# Patient Record
Sex: MALE | Race: WHITE | NOT HISPANIC OR LATINO | URBAN - METROPOLITAN AREA
[De-identification: names, ages, dates, MRNs, and addresses within clinical notes are randomized per-mention and may not be internally consistent; named-entity substitution may affect disease eponyms.]

---

## 2017-09-06 ENCOUNTER — OUTPATIENT (OUTPATIENT)
Dept: OUTPATIENT SERVICES | Facility: HOSPITAL | Age: 62
LOS: 1 days | End: 2017-09-06
Payer: MEDICAID

## 2017-09-06 DIAGNOSIS — I10 ESSENTIAL (PRIMARY) HYPERTENSION: ICD-10-CM

## 2017-09-06 DIAGNOSIS — I73.9 PERIPHERAL VASCULAR DISEASE, UNSPECIFIED: ICD-10-CM

## 2017-09-06 DIAGNOSIS — D64.9 ANEMIA, UNSPECIFIED: ICD-10-CM

## 2017-09-06 PROCEDURE — 74176 CT ABD & PELVIS W/O CONTRAST: CPT

## 2017-09-06 PROCEDURE — 74176 CT ABD & PELVIS W/O CONTRAST: CPT | Mod: 26

## 2017-11-13 ENCOUNTER — APPOINTMENT (OUTPATIENT)
Dept: NEPHROLOGY | Facility: CLINIC | Age: 62
End: 2017-11-13

## 2017-11-30 ENCOUNTER — INPATIENT (INPATIENT)
Facility: HOSPITAL | Age: 62
LOS: 7 days | Discharge: NURSING, REHAB OR LONG-TERM CARE | DRG: 208 | End: 2017-12-08
Attending: INTERNAL MEDICINE | Admitting: INTERNAL MEDICINE
Payer: MEDICAID

## 2017-11-30 VITALS
HEIGHT: 64 IN | TEMPERATURE: 96 F | HEART RATE: 40 BPM | OXYGEN SATURATION: 85 % | RESPIRATION RATE: 24 BRPM | DIASTOLIC BLOOD PRESSURE: 62 MMHG | WEIGHT: 139.99 LBS | SYSTOLIC BLOOD PRESSURE: 196 MMHG

## 2017-11-30 DIAGNOSIS — I35.0 NONRHEUMATIC AORTIC (VALVE) STENOSIS: ICD-10-CM

## 2017-11-30 DIAGNOSIS — F10.10 ALCOHOL ABUSE, UNCOMPLICATED: ICD-10-CM

## 2017-11-30 DIAGNOSIS — N18.5 CHRONIC KIDNEY DISEASE, STAGE 5: ICD-10-CM

## 2017-11-30 DIAGNOSIS — I46.9 CARDIAC ARREST, CAUSE UNSPECIFIED: ICD-10-CM

## 2017-11-30 DIAGNOSIS — J96.90 RESPIRATORY FAILURE, UNSPECIFIED, UNSPECIFIED WHETHER WITH HYPOXIA OR HYPERCAPNIA: ICD-10-CM

## 2017-11-30 DIAGNOSIS — J96.01 ACUTE RESPIRATORY FAILURE WITH HYPOXIA: ICD-10-CM

## 2017-11-30 DIAGNOSIS — E11.22 TYPE 2 DIABETES MELLITUS WITH DIABETIC CHRONIC KIDNEY DISEASE: ICD-10-CM

## 2017-11-30 DIAGNOSIS — J44.9 CHRONIC OBSTRUCTIVE PULMONARY DISEASE, UNSPECIFIED: ICD-10-CM

## 2017-11-30 DIAGNOSIS — I73.9 PERIPHERAL VASCULAR DISEASE, UNSPECIFIED: ICD-10-CM

## 2017-11-30 DIAGNOSIS — J44.1 CHRONIC OBSTRUCTIVE PULMONARY DISEASE WITH (ACUTE) EXACERBATION: ICD-10-CM

## 2017-11-30 DIAGNOSIS — H57.02 ANISOCORIA: ICD-10-CM

## 2017-11-30 DIAGNOSIS — T17.908A UNSPECIFIED FOREIGN BODY IN RESPIRATORY TRACT, PART UNSPECIFIED CAUSING OTHER INJURY, INITIAL ENCOUNTER: ICD-10-CM

## 2017-11-30 DIAGNOSIS — I50.9 HEART FAILURE, UNSPECIFIED: ICD-10-CM

## 2017-11-30 DIAGNOSIS — N19 UNSPECIFIED KIDNEY FAILURE: ICD-10-CM

## 2017-11-30 LAB
ALBUMIN SERPL ELPH-MCNC: 3.2 G/DL — LOW (ref 3.3–5)
ALP SERPL-CCNC: 122 U/L — HIGH (ref 30–120)
ALT FLD-CCNC: 24 U/L DA — SIGNIFICANT CHANGE UP (ref 10–60)
AMPHET UR-MCNC: NEGATIVE — SIGNIFICANT CHANGE UP
ANION GAP SERPL CALC-SCNC: 16 MMOL/L — SIGNIFICANT CHANGE UP (ref 5–17)
ANION GAP SERPL CALC-SCNC: 16 MMOL/L — SIGNIFICANT CHANGE UP (ref 5–17)
ANION GAP SERPL CALC-SCNC: 17 MMOL/L — SIGNIFICANT CHANGE UP (ref 5–17)
APPEARANCE UR: ABNORMAL
APTT BLD: 36.1 SEC — SIGNIFICANT CHANGE UP (ref 27.5–37.4)
AST SERPL-CCNC: 23 U/L — SIGNIFICANT CHANGE UP (ref 10–40)
BACTERIA # UR AUTO: ABNORMAL
BARBITURATES UR SCN-MCNC: NEGATIVE — SIGNIFICANT CHANGE UP
BASE EXCESS BLDA CALC-SCNC: -9.8 MMOL/L — LOW (ref -2–2)
BASE EXCESS BLDA CALC-SCNC: -9.8 MMOL/L — LOW (ref -2–2)
BASOPHILS # BLD AUTO: 0.1 K/UL — SIGNIFICANT CHANGE UP (ref 0–0.2)
BASOPHILS NFR BLD AUTO: 1.3 % — SIGNIFICANT CHANGE UP (ref 0–2)
BENZODIAZ UR-MCNC: POSITIVE
BILIRUB SERPL-MCNC: 0.4 MG/DL — SIGNIFICANT CHANGE UP (ref 0.2–1.2)
BILIRUB UR-MCNC: NEGATIVE — SIGNIFICANT CHANGE UP
BLOOD GAS COMMENTS: SIGNIFICANT CHANGE UP
BLOOD GAS SOURCE: SIGNIFICANT CHANGE UP
BLOOD GAS SOURCE: SIGNIFICANT CHANGE UP
BUN SERPL-MCNC: 55 MG/DL — HIGH (ref 7–23)
BUN SERPL-MCNC: 56 MG/DL — HIGH (ref 7–23)
BUN SERPL-MCNC: 59 MG/DL — HIGH (ref 7–23)
CALCIUM SERPL-MCNC: 7.6 MG/DL — LOW (ref 8.4–10.5)
CALCIUM SERPL-MCNC: 7.6 MG/DL — LOW (ref 8.4–10.5)
CALCIUM SERPL-MCNC: 7.9 MG/DL — LOW (ref 8.4–10.5)
CHLORIDE SERPL-SCNC: 107 MMOL/L — SIGNIFICANT CHANGE UP (ref 96–108)
CHLORIDE SERPL-SCNC: 107 MMOL/L — SIGNIFICANT CHANGE UP (ref 96–108)
CHLORIDE SERPL-SCNC: 110 MMOL/L — HIGH (ref 96–108)
CK MB CFR SERPL CALC: 4.3 NG/ML — HIGH (ref 0–3.6)
CO2 SERPL-SCNC: 16 MMOL/L — LOW (ref 22–31)
CO2 SERPL-SCNC: 17 MMOL/L — LOW (ref 22–31)
CO2 SERPL-SCNC: 19 MMOL/L — LOW (ref 22–31)
COCAINE METAB.OTHER UR-MCNC: NEGATIVE — SIGNIFICANT CHANGE UP
COLOR SPEC: YELLOW — SIGNIFICANT CHANGE UP
COMMENT - URINE: SIGNIFICANT CHANGE UP
CREAT SERPL-MCNC: 4.69 MG/DL — HIGH (ref 0.5–1.3)
CREAT SERPL-MCNC: 4.82 MG/DL — HIGH (ref 0.5–1.3)
CREAT SERPL-MCNC: 5.1 MG/DL — HIGH (ref 0.5–1.3)
DIFF PNL FLD: ABNORMAL
EOSINOPHIL # BLD AUTO: 0 K/UL — SIGNIFICANT CHANGE UP (ref 0–0.5)
EOSINOPHIL NFR BLD AUTO: 0.7 % — SIGNIFICANT CHANGE UP (ref 0–6)
EPI CELLS # UR: ABNORMAL
GLUCOSE BLDC GLUCOMTR-MCNC: 138 MG/DL — HIGH (ref 70–99)
GLUCOSE BLDC GLUCOMTR-MCNC: 142 MG/DL — HIGH (ref 70–99)
GLUCOSE BLDC GLUCOMTR-MCNC: 146 MG/DL — HIGH (ref 70–99)
GLUCOSE BLDC GLUCOMTR-MCNC: 177 MG/DL — HIGH (ref 70–99)
GLUCOSE SERPL-MCNC: 135 MG/DL — HIGH (ref 70–99)
GLUCOSE SERPL-MCNC: 141 MG/DL — HIGH (ref 70–99)
GLUCOSE SERPL-MCNC: 192 MG/DL — HIGH (ref 70–99)
GLUCOSE UR QL: 50 MG/DL
GRAM STN FLD: SIGNIFICANT CHANGE UP
GRAN CASTS # UR COMP ASSIST: ABNORMAL /LPF
HCO3 BLDA-SCNC: 16 MMOL/L — LOW (ref 21–29)
HCO3 BLDA-SCNC: 17 MMOL/L — LOW (ref 21–29)
HCT VFR BLD CALC: 30.4 % — LOW (ref 39–50)
HGB BLD-MCNC: 10.3 G/DL — LOW (ref 13–17)
HOROWITZ INDEX BLDA+IHG-RTO: 100 — SIGNIFICANT CHANGE UP
HOROWITZ INDEX BLDA+IHG-RTO: 100 — SIGNIFICANT CHANGE UP
HYALINE CASTS # UR AUTO: ABNORMAL /LPF
INR BLD: 1.08 RATIO — SIGNIFICANT CHANGE UP (ref 0.88–1.16)
KETONES UR-MCNC: NEGATIVE — SIGNIFICANT CHANGE UP
LACTATE SERPL-SCNC: 1.3 MMOL/L — SIGNIFICANT CHANGE UP (ref 0.7–2)
LACTATE SERPL-SCNC: 3.4 MMOL/L — HIGH (ref 0.7–2)
LACTATE SERPL-SCNC: 4.8 MMOL/L — CRITICAL HIGH (ref 0.7–2)
LEUKOCYTE ESTERASE UR-ACNC: ABNORMAL
LYMPHOCYTES # BLD AUTO: 0.7 K/UL — LOW (ref 1–3.3)
LYMPHOCYTES # BLD AUTO: 14.8 % — SIGNIFICANT CHANGE UP (ref 13–44)
MAGNESIUM SERPL-MCNC: 3.3 MG/DL — HIGH (ref 1.6–2.6)
MCHC RBC-ENTMCNC: 31.8 PG — SIGNIFICANT CHANGE UP (ref 27–34)
MCHC RBC-ENTMCNC: 34 GM/DL — SIGNIFICANT CHANGE UP (ref 32–36)
MCV RBC AUTO: 93.8 FL — SIGNIFICANT CHANGE UP (ref 80–100)
METHADONE UR-MCNC: NEGATIVE — SIGNIFICANT CHANGE UP
MONOCYTES # BLD AUTO: 0.3 K/UL — SIGNIFICANT CHANGE UP (ref 0–0.9)
MONOCYTES NFR BLD AUTO: 6.7 % — SIGNIFICANT CHANGE UP (ref 2–14)
NEUTROPHILS # BLD AUTO: 3.6 K/UL — SIGNIFICANT CHANGE UP (ref 1.8–7.4)
NEUTROPHILS NFR BLD AUTO: 76.6 % — SIGNIFICANT CHANGE UP (ref 43–77)
NITRITE UR-MCNC: NEGATIVE — SIGNIFICANT CHANGE UP
NT-PROBNP SERPL-SCNC: HIGH PG/ML (ref 0–125)
OPIATES UR-MCNC: NEGATIVE — SIGNIFICANT CHANGE UP
PCO2 BLDA: 26 MMHG — LOW (ref 32–46)
PCO2 BLDA: 37 MMHG — SIGNIFICANT CHANGE UP (ref 32–46)
PCP SPEC-MCNC: SIGNIFICANT CHANGE UP
PCP UR-MCNC: NEGATIVE — SIGNIFICANT CHANGE UP
PH BLD: 7.25 — LOW (ref 7.35–7.45)
PH BLD: 7.37 — SIGNIFICANT CHANGE UP (ref 7.35–7.45)
PH UR: 5 — SIGNIFICANT CHANGE UP (ref 5–8)
PLATELET # BLD AUTO: 222 K/UL — SIGNIFICANT CHANGE UP (ref 150–400)
PO2 BLDA: 191 MMHG — HIGH (ref 74–108)
PO2 BLDA: 54 MMHG — LOW (ref 74–108)
POTASSIUM SERPL-MCNC: 5.1 MMOL/L — SIGNIFICANT CHANGE UP (ref 3.5–5.3)
POTASSIUM SERPL-MCNC: 5.4 MMOL/L — HIGH (ref 3.5–5.3)
POTASSIUM SERPL-MCNC: 5.5 MMOL/L — HIGH (ref 3.5–5.3)
POTASSIUM SERPL-SCNC: 5.1 MMOL/L — SIGNIFICANT CHANGE UP (ref 3.5–5.3)
POTASSIUM SERPL-SCNC: 5.4 MMOL/L — HIGH (ref 3.5–5.3)
POTASSIUM SERPL-SCNC: 5.5 MMOL/L — HIGH (ref 3.5–5.3)
PROT SERPL-MCNC: 6.7 G/DL — SIGNIFICANT CHANGE UP (ref 6–8.3)
PROT UR-MCNC: 500 MG/DL
PROTHROM AB SERPL-ACNC: 11.8 SEC — SIGNIFICANT CHANGE UP (ref 9.8–12.7)
RBC # BLD: 3.24 M/UL — LOW (ref 4.2–5.8)
RBC # FLD: 14.4 % — SIGNIFICANT CHANGE UP (ref 10.3–14.5)
RBC CASTS # UR COMP ASSIST: ABNORMAL /HPF (ref 0–4)
SAO2 % BLDA: 100 % — HIGH (ref 92–96)
SAO2 % BLDA: 82 % — LOW (ref 92–96)
SODIUM SERPL-SCNC: 140 MMOL/L — SIGNIFICANT CHANGE UP (ref 135–145)
SODIUM SERPL-SCNC: 140 MMOL/L — SIGNIFICANT CHANGE UP (ref 135–145)
SODIUM SERPL-SCNC: 145 MMOL/L — SIGNIFICANT CHANGE UP (ref 135–145)
SP GR SPEC: 1.02 — SIGNIFICANT CHANGE UP (ref 1.01–1.02)
SPECIMEN SOURCE: SIGNIFICANT CHANGE UP
THC UR QL: NEGATIVE — SIGNIFICANT CHANGE UP
TROPONIN I SERPL-MCNC: 0.01 NG/ML — LOW (ref 0.02–0.06)
TSH SERPL-MCNC: 9.17 UIU/ML — HIGH (ref 0.27–4.2)
UROBILINOGEN FLD QL: NEGATIVE MG/DL — SIGNIFICANT CHANGE UP
WBC # BLD: 4.7 K/UL — SIGNIFICANT CHANGE UP (ref 3.8–10.5)
WBC # FLD AUTO: 4.7 K/UL — SIGNIFICANT CHANGE UP (ref 3.8–10.5)
WBC UR QL: SIGNIFICANT CHANGE UP

## 2017-11-30 PROCEDURE — 71250 CT THORAX DX C-: CPT | Mod: 26

## 2017-11-30 PROCEDURE — 70450 CT HEAD/BRAIN W/O DYE: CPT | Mod: 26

## 2017-11-30 PROCEDURE — 99291 CRITICAL CARE FIRST HOUR: CPT

## 2017-11-30 PROCEDURE — 76775 US EXAM ABDO BACK WALL LIM: CPT | Mod: 26

## 2017-11-30 PROCEDURE — 99292 CRITICAL CARE ADDL 30 MIN: CPT

## 2017-11-30 PROCEDURE — 93306 TTE W/DOPPLER COMPLETE: CPT | Mod: 26

## 2017-11-30 PROCEDURE — 99223 1ST HOSP IP/OBS HIGH 75: CPT | Mod: 25

## 2017-11-30 PROCEDURE — 71010: CPT | Mod: 26

## 2017-11-30 PROCEDURE — 93010 ELECTROCARDIOGRAM REPORT: CPT

## 2017-11-30 RX ORDER — SODIUM CHLORIDE 9 MG/ML
1000 INJECTION, SOLUTION INTRAVENOUS
Qty: 0 | Refills: 0 | Status: DISCONTINUED | OUTPATIENT
Start: 2017-11-30 | End: 2017-12-08

## 2017-11-30 RX ORDER — ALBUTEROL 90 UG/1
2 AEROSOL, METERED ORAL EVERY 6 HOURS
Qty: 0 | Refills: 0 | Status: DISCONTINUED | OUTPATIENT
Start: 2017-11-30 | End: 2017-12-01

## 2017-11-30 RX ORDER — SODIUM CHLORIDE 9 MG/ML
1000 INJECTION INTRAMUSCULAR; INTRAVENOUS; SUBCUTANEOUS
Qty: 0 | Refills: 0 | Status: COMPLETED | OUTPATIENT
Start: 2017-11-30 | End: 2017-11-30

## 2017-11-30 RX ORDER — FENTANYL CITRATE 50 UG/ML
25 INJECTION INTRAVENOUS
Qty: 0 | Refills: 0 | Status: DISCONTINUED | OUTPATIENT
Start: 2017-11-30 | End: 2017-12-01

## 2017-11-30 RX ORDER — INSULIN LISPRO 100/ML
VIAL (ML) SUBCUTANEOUS EVERY 6 HOURS
Qty: 0 | Refills: 0 | Status: DISCONTINUED | OUTPATIENT
Start: 2017-11-30 | End: 2017-12-01

## 2017-11-30 RX ORDER — MIDAZOLAM HYDROCHLORIDE 1 MG/ML
2 INJECTION, SOLUTION INTRAMUSCULAR; INTRAVENOUS ONCE
Qty: 0 | Refills: 0 | Status: DISCONTINUED | OUTPATIENT
Start: 2017-11-30 | End: 2017-11-30

## 2017-11-30 RX ORDER — PROPOFOL 10 MG/ML
5 INJECTION, EMULSION INTRAVENOUS
Qty: 1000 | Refills: 0 | Status: DISCONTINUED | OUTPATIENT
Start: 2017-11-30 | End: 2017-12-01

## 2017-11-30 RX ORDER — HEPARIN SODIUM 5000 [USP'U]/ML
5000 INJECTION INTRAVENOUS; SUBCUTANEOUS EVERY 8 HOURS
Qty: 0 | Refills: 0 | Status: DISCONTINUED | OUTPATIENT
Start: 2017-11-30 | End: 2017-12-08

## 2017-11-30 RX ORDER — SODIUM CHLORIDE 9 MG/ML
1000 INJECTION INTRAMUSCULAR; INTRAVENOUS; SUBCUTANEOUS
Qty: 0 | Refills: 0 | Status: DISCONTINUED | OUTPATIENT
Start: 2017-11-30 | End: 2017-11-30

## 2017-11-30 RX ORDER — DEXTROSE 50 % IN WATER 50 %
25 SYRINGE (ML) INTRAVENOUS ONCE
Qty: 0 | Refills: 0 | Status: DISCONTINUED | OUTPATIENT
Start: 2017-11-30 | End: 2017-12-08

## 2017-11-30 RX ORDER — FERROUS SULFATE 325(65) MG
1 TABLET ORAL
Qty: 0 | Refills: 0 | COMMUNITY

## 2017-11-30 RX ORDER — PIPERACILLIN AND TAZOBACTAM 4; .5 G/20ML; G/20ML
3.38 INJECTION, POWDER, LYOPHILIZED, FOR SOLUTION INTRAVENOUS EVERY 8 HOURS
Qty: 0 | Refills: 0 | Status: DISCONTINUED | OUTPATIENT
Start: 2017-11-30 | End: 2017-11-30

## 2017-11-30 RX ORDER — INFLUENZA VIRUS VACCINE 15; 15; 15; 15 UG/.5ML; UG/.5ML; UG/.5ML; UG/.5ML
0.5 SUSPENSION INTRAMUSCULAR ONCE
Qty: 0 | Refills: 0 | Status: COMPLETED | OUTPATIENT
Start: 2017-11-30 | End: 2017-11-30

## 2017-11-30 RX ORDER — GLUCAGON INJECTION, SOLUTION 0.5 MG/.1ML
1 INJECTION, SOLUTION SUBCUTANEOUS ONCE
Qty: 0 | Refills: 0 | Status: DISCONTINUED | OUTPATIENT
Start: 2017-11-30 | End: 2017-12-08

## 2017-11-30 RX ORDER — ENOXAPARIN SODIUM 100 MG/ML
40 INJECTION SUBCUTANEOUS DAILY
Qty: 0 | Refills: 0 | Status: DISCONTINUED | OUTPATIENT
Start: 2017-11-30 | End: 2017-11-30

## 2017-11-30 RX ORDER — PIPERACILLIN AND TAZOBACTAM 4; .5 G/20ML; G/20ML
3.38 INJECTION, POWDER, LYOPHILIZED, FOR SOLUTION INTRAVENOUS ONCE
Qty: 0 | Refills: 0 | Status: COMPLETED | OUTPATIENT
Start: 2017-11-30 | End: 2017-11-30

## 2017-11-30 RX ORDER — AMLODIPINE BESYLATE 2.5 MG/1
1 TABLET ORAL
Qty: 0 | Refills: 0 | COMMUNITY

## 2017-11-30 RX ORDER — DEXTROSE 50 % IN WATER 50 %
12.5 SYRINGE (ML) INTRAVENOUS ONCE
Qty: 0 | Refills: 0 | Status: DISCONTINUED | OUTPATIENT
Start: 2017-11-30 | End: 2017-12-08

## 2017-11-30 RX ORDER — VANCOMYCIN HCL 1 G
1000 VIAL (EA) INTRAVENOUS ONCE
Qty: 0 | Refills: 0 | Status: COMPLETED | OUTPATIENT
Start: 2017-11-30 | End: 2017-11-30

## 2017-11-30 RX ORDER — SODIUM CHLORIDE 9 MG/ML
1000 INJECTION, SOLUTION INTRAVENOUS
Qty: 0 | Refills: 0 | Status: DISCONTINUED | OUTPATIENT
Start: 2017-11-30 | End: 2017-12-01

## 2017-11-30 RX ORDER — HYDRALAZINE HCL 50 MG
0 TABLET ORAL
Qty: 0 | Refills: 0 | COMMUNITY

## 2017-11-30 RX ORDER — PIPERACILLIN AND TAZOBACTAM 4; .5 G/20ML; G/20ML
3.38 INJECTION, POWDER, LYOPHILIZED, FOR SOLUTION INTRAVENOUS EVERY 12 HOURS
Qty: 0 | Refills: 0 | Status: COMPLETED | OUTPATIENT
Start: 2017-11-30 | End: 2017-12-06

## 2017-11-30 RX ORDER — SODIUM BICARBONATE 1 MEQ/ML
50 SYRINGE (ML) INTRAVENOUS ONCE
Qty: 0 | Refills: 0 | Status: COMPLETED | OUTPATIENT
Start: 2017-11-30 | End: 2017-11-30

## 2017-11-30 RX ORDER — MIDAZOLAM HYDROCHLORIDE 1 MG/ML
2 INJECTION, SOLUTION INTRAMUSCULAR; INTRAVENOUS
Qty: 0 | Refills: 0 | Status: DISCONTINUED | OUTPATIENT
Start: 2017-11-30 | End: 2017-11-30

## 2017-11-30 RX ORDER — VANCOMYCIN HCL 1 G
1000 VIAL (EA) INTRAVENOUS EVERY 12 HOURS
Qty: 0 | Refills: 0 | Status: DISCONTINUED | OUTPATIENT
Start: 2017-11-30 | End: 2017-11-30

## 2017-11-30 RX ORDER — PANTOPRAZOLE SODIUM 20 MG/1
40 TABLET, DELAYED RELEASE ORAL DAILY
Qty: 0 | Refills: 0 | Status: DISCONTINUED | OUTPATIENT
Start: 2017-11-30 | End: 2017-12-08

## 2017-11-30 RX ORDER — DEXTROSE 50 % IN WATER 50 %
1 SYRINGE (ML) INTRAVENOUS ONCE
Qty: 0 | Refills: 0 | Status: DISCONTINUED | OUTPATIENT
Start: 2017-11-30 | End: 2017-12-08

## 2017-11-30 RX ADMIN — Medication 1: at 23:35

## 2017-11-30 RX ADMIN — HEPARIN SODIUM 5000 UNIT(S): 5000 INJECTION INTRAVENOUS; SUBCUTANEOUS at 23:00

## 2017-11-30 RX ADMIN — MIDAZOLAM HYDROCHLORIDE 2 MILLIGRAM(S): 1 INJECTION, SOLUTION INTRAMUSCULAR; INTRAVENOUS at 06:09

## 2017-11-30 RX ADMIN — PROPOFOL 1.91 MICROGRAM(S)/KG/MIN: 10 INJECTION, EMULSION INTRAVENOUS at 19:45

## 2017-11-30 RX ADMIN — PROPOFOL 1.91 MICROGRAM(S)/KG/MIN: 10 INJECTION, EMULSION INTRAVENOUS at 06:31

## 2017-11-30 RX ADMIN — ALBUTEROL 2 PUFF(S): 90 AEROSOL, METERED ORAL at 12:52

## 2017-11-30 RX ADMIN — MIDAZOLAM HYDROCHLORIDE 2 MILLIGRAM(S): 1 INJECTION, SOLUTION INTRAMUSCULAR; INTRAVENOUS at 06:32

## 2017-11-30 RX ADMIN — HEPARIN SODIUM 5000 UNIT(S): 5000 INJECTION INTRAVENOUS; SUBCUTANEOUS at 13:22

## 2017-11-30 RX ADMIN — PIPERACILLIN AND TAZOBACTAM 200 GRAM(S): 4; .5 INJECTION, POWDER, LYOPHILIZED, FOR SOLUTION INTRAVENOUS at 06:09

## 2017-11-30 RX ADMIN — PANTOPRAZOLE SODIUM 40 MILLIGRAM(S): 20 TABLET, DELAYED RELEASE ORAL at 11:36

## 2017-11-30 RX ADMIN — SODIUM CHLORIDE 75 MILLILITER(S): 9 INJECTION, SOLUTION INTRAVENOUS at 15:33

## 2017-11-30 RX ADMIN — MIDAZOLAM HYDROCHLORIDE 2 MILLIGRAM(S): 1 INJECTION, SOLUTION INTRAMUSCULAR; INTRAVENOUS at 06:48

## 2017-11-30 RX ADMIN — Medication 50 MILLIEQUIVALENT(S): at 06:57

## 2017-11-30 RX ADMIN — PROPOFOL 1.91 MICROGRAM(S)/KG/MIN: 10 INJECTION, EMULSION INTRAVENOUS at 07:11

## 2017-11-30 RX ADMIN — PROPOFOL 1.91 MICROGRAM(S)/KG/MIN: 10 INJECTION, EMULSION INTRAVENOUS at 15:33

## 2017-11-30 RX ADMIN — SODIUM CHLORIDE 1000 MILLILITER(S): 9 INJECTION INTRAMUSCULAR; INTRAVENOUS; SUBCUTANEOUS at 06:07

## 2017-11-30 RX ADMIN — FENTANYL CITRATE 25 MICROGRAM(S): 50 INJECTION INTRAVENOUS at 23:44

## 2017-11-30 RX ADMIN — PIPERACILLIN AND TAZOBACTAM 25 GRAM(S): 4; .5 INJECTION, POWDER, LYOPHILIZED, FOR SOLUTION INTRAVENOUS at 17:18

## 2017-11-30 RX ADMIN — SODIUM CHLORIDE 75 MILLILITER(S): 9 INJECTION INTRAMUSCULAR; INTRAVENOUS; SUBCUTANEOUS at 06:33

## 2017-11-30 RX ADMIN — SODIUM CHLORIDE 75 MILLILITER(S): 9 INJECTION INTRAMUSCULAR; INTRAVENOUS; SUBCUTANEOUS at 07:11

## 2017-11-30 RX ADMIN — ALBUTEROL 2 PUFF(S): 90 AEROSOL, METERED ORAL at 19:48

## 2017-11-30 RX ADMIN — SODIUM CHLORIDE 1000 MILLILITER(S): 9 INJECTION INTRAMUSCULAR; INTRAVENOUS; SUBCUTANEOUS at 06:08

## 2017-11-30 RX ADMIN — Medication 250 MILLIGRAM(S): at 06:32

## 2017-11-30 NOTE — CONSULT NOTE ADULT - SUBJECTIVE AND OBJECTIVE BOX
CHIEF COMPLAINT: None offered due to critical illness / condition    HPI: 62 year old man with a history of chronic illness, severe PVD s/p left BKA, anemia, peripheral neuropathy, uncontrolled DM, etOH abuse transferred from his long term care facility for the evaluation of hypoxia.  Shortly after arrival in the ED he was hypothermic / hypoxic and he experienced an asystolic arrest and was successfully resuscitated with CPR (chest compressions) and epinephrine.  He was found to be in renal failure.  He is intubated and sedated on a ventilator in the SPCU - unable to participate in history.      PAST MEDICAL & SURGICAL HISTORY:  Peripheral Arterial Disease  Controlled Type 2 Diabetes with Leg or Foot Ulcer  ETOH Abuse  Amputation, Below Knee, Unilateral, Traumatic    SOCIAL HISTORY: Unable to obtain due to patient condition    FAMILY HISTORY: Unable to obtain due to patient condition    MEDICATIONS  (STANDING):  ALBUTerol    90 MICROgram(s) HFA Inhaler 2 Puff(s) Inhalation every 6 hours  heparin  Injectable 5000 Unit(s) SubCutaneous every 8 hours  influenza   Vaccine 0.5 milliLiter(s) IntraMuscular once  pantoprazole  Injectable 40 milliGRAM(s) IV Push daily  piperacillin/tazobactam IVPB. 3.375 Gram(s) IV Intermittent every 12 hours  propofol Infusion 5 MICROgram(s)/kG/Min (1.905 mL/Hr) IV Continuous <Continuous>  sodium chloride 0.9%. 1000 milliLiter(s) (75 mL/Hr) IV Continuous <Continuous>    MEDICATIONS  (PRN):  fentaNYL    Injectable 25 MICROGram(s) IV Push every 2 hours PRN pain, sedation, resp distress, ventilated pt    Allergies: No Known Allergies    REVIEW OF SYSTEMS: Unable to obtain due to patient condition    VITAL SIGNS:   Vital Signs Last 24 Hrs  T(C): 36.4 (30 Nov 2017 09:02), Max: 36.4 (30 Nov 2017 09:02)  T(F): 97.5 (30 Nov 2017 09:02), Max: 97.5 (30 Nov 2017 09:02)  HR: 49 (30 Nov 2017 10:00) (40 - 124)  BP: 129/53 (30 Nov 2017 10:00) (110/62 - 196/62)  BP(mean): 77 (30 Nov 2017 10:00) (75 - 80)  RR: 23 (30 Nov 2017 10:00) (20 - 34)  SpO2: 100% (30 Nov 2017 10:00) (85% - 100%)    PHYSICAL EXAM:  Constitutional: Sedated on vent, not following commands, appears older than his stated age  Eyes:  ,  Pupil assymetry.  Pulmonary: Mechanically ventilated via ETT; no crackles or wheeze  Cardiovascular: S1 and S2, regular rate and rhythm, I/VI systolic murmur  Gastrointestinal: Abdomen is soft, nontender.   Lymph: No pedal edema. No cervical lymphadenopathy.  Neurological: Sedated; minimally responsive (per RN) when sedation is held.  Skin: No rash.    LABS:                  10.3   4.7   )-----------( 222      ( 30 Nov 2017 05:40 )             30.4     140    |  107    |  59     ----------------------------<  192    5.1     |  17     |  5.10     CARDIAC MARKERS ( 30 Nov 2017 05:40 ) .005 ng/mL / x     / x     / x     / 4.3 ng/mL    11-30 @ 05:40 Pro Bnp [id number]    ECG (11/30 @ 6:03): Sinus bradycardia 59 bpm, low QRS voltage in limb leads, possible inferior infarct (old), mildly prolonged QTc    CT Chest No Cont (11.30.17 @ 08:25): Small bilateral pleural effusions.  Bilateral airspace disease present. Airspace disease present in the right upper lobe right middle lobe and right lower lobe. Airspace disease visible in the left lower lobe. No mediastinal lesions evident. Hilar regions obscured by bilateral lung pathology. Cardiomegaly and coronary artery calcifications present. No evidence of pericardial effusion. No evidence of thoracic aortic aneurysm. The central airway is intact. An endotracheal tube is present. No adrenal  lesions. The spleen is not enlarged. No acute osseous abnormalities.    US Transthoracic Echocardiogram w/Doppler Complete (11.30.17 @ 08:14) >  1. Normal left ventricular size and function. LVEF estimated at 65-70%.  2. Normal right ventricular size and function.  3. Minimal prolapse of the anterior mitral leaflet. Mild mitral stenosis.  Minimal mitral regurgitation.  4. Mild aortic stenosis.  5. Mild tricuspid regurgitation.     Tele: Sinus bradycardia

## 2017-11-30 NOTE — DIETITIAN INITIAL EVALUATION ADULT. - NS AS NUTRI INTERV ENTERAL NUTRITION
Composition/Rate/Recommend: Suplena via NGT/desired route start at 30ml/hr, increase by 10ml every 6 hrs until goal 45ml/hr is reached (to provide 1944kcal, 48 g protein)/Volume

## 2017-11-30 NOTE — CONSULT NOTE ADULT - ASSESSMENT
·	EDWIN on CKD 4: ATN  ·	s/p cardiac arrest, on vent, ? Pneumonia  ·	Metaboic acidosis  ·	Hyperkalemia  ·	Diabetes    Improving creatinine trend. pt with underlying CKD 4. Will add bicarb to IVF. IV lasix.   Pulmonary and cardiology follow up. Check cultures. IV abx as per ID.   Will follow electrolytes and renal function trend. Monitor potassium trend.   Monitor blood sugar levels. Coverage as needed.   Further recommendations pending clinical course. Thank you for the courtesy of this referral.
62M with unknown pmhx, admitted with cardiac arrest, acute hypoxemic respiratory failure, large right pna    -Sedation for vent synchrony after neuro status obtained  -EKG, check CE's  -Monitor HD's  -Check echo  -Continue lung protective ventilation. Unstable for SBT at this time  -NPO. PPI  -IV fluids  -F/U labs, cultures  -Empiric abx. Monitor wbc/temp  -DVT ppx  -supportive care
Seen for AMS/s/op cardiac arrest.  Neuro-exam limited but no focal findings seen.  Pt is awake and seem to follow commands.  Less likely that pt had any hypoxic/anoxic brain injury.  Etiology of unequal pupils are not known, did pt had any eye sx in past?  CT Head - No acute pathology.  Would get MRI Brain upon extubation - if no contraindications.  Supportive care.

## 2017-11-30 NOTE — H&P ADULT - PROBLEM SELECTOR PLAN 5
likely aspiration pneumonia.  c/w IV zosyn.  f/u lactate level.  f/u culture reports.  ID-  consult appreciated.

## 2017-11-30 NOTE — CONSULT NOTE ADULT - SUBJECTIVE AND OBJECTIVE BOX
Reason for Consult :     HPI:    62 year old male hx of ? COPD, ETOH abuse, PVD, s/p right BKA , ? CKD vs EDWIN , DM admitted with acuter cardiorespiratory arrest , primarily respiratory failure leading to cardiac arrest. Was sent to ER with increasing SOB and ams . He was hypoxic  on admission   . He was in moderate respiratory distress after suctioning went into cardiac arrest .     Past Medical & Surgical Hx:  PAST MEDICAL & SURGICAL HISTORY:  Peripheral Arterial Disease  Controlled Type 2 Diabetes with Leg or Foot Ulcer  ETOH Abuse  Amputation, Below Knee, Unilateral, Traumatic      Social History-- Long term resident   EtOH: priro hx of etoh   Tobacco:   Drug Use:       FAMILY HISTORY:      Allergies    No Known Allergies    Intolerances        Home/Outpatient  Medications   Home Medications:  DermaPhor topical ointment:  (2017 07:23)  ferrous sulfate 325 mg (65 mg elemental iron) oral delayed release tablet: 1 tab(s) orally once a day (2017 07:23)  HumaLOG KwikPen:  (2017 07:23)  hydrALAZINE: 75 milligram(s) orally every 12 hours (:23)  Metoprolol Tartrate 25 mg oral tablet: orally every 8 hours (2017 07:23)  Norvasc 5 mg oral tablet: 1 tab(s) orally once a day (2017 07:23)  Procrit 3000 units/mL injectable solution: injectable 3 times a week (2017 07:23)  sodium bicarbonate 650 mg oral tablet: orally once a day (2017 07:23)      Current Inpatient Medications :    ANTIBIOTICS:   piperacillin/tazobactam IVPB. 3.375 Gram(s) IV Intermittent every 8 hours  vancomycin  IVPB 1000 milliGRAM(s) IV Intermittent every 12 hours      OTHER RELEVANT MEDICATIONS :  ALBUTerol    90 MICROgram(s) HFA Inhaler 2 Puff(s) Inhalation every 6 hours  fentaNYL    Injectable 25 MICROGram(s) IV Push every 2 hours PRN  heparin  Injectable 5000 Unit(s) SubCutaneous every 8 hours  pantoprazole  Injectable 40 milliGRAM(s) IV Push daily  propofol Infusion 5 MICROgram(s)/kG/Min IV Continuous <Continuous>  sodium chloride 0.9%. 1000 milliLiter(s) IV Continuous <Continuous>          REVIEW OF SYSTEMS:    ROS:  Unable to obtain due to : intubated, un responsive         I&O's Detail    2017 07:01  -  2017 07:00  --------------------------------------------------------  IN:    IV PiggyBack: 350 mL    Sodium Chloride 0.9% IV Bolus: 2000 mL  Total IN: 2350 mL    OUT:  Total OUT: 0 mL    Total NET: 2350 mL        Mode: AC/ CMV (Assist Control/ Continuous Mandatory Ventilation), RR (machine): 22, TV (machine): 450, FiO2: 100, PEEP: 10, ITime: 1, MAP: 11, PIP: 32    Physical Exam:  Vital Signs Last 24 Hrs  T(C): 35.6 (2017 05:09), Max: 35.6 (2017 05:09)  T(F): 96 (2017 05:09), Max: 96 (2017 05:09)  HR: 71 (2017 07:11) (40 - 124)  BP: 110/62 (2017 07:11) (110/62 - 196/62)  BP(mean): --  RR: 29 (2017 07:11) (20 - 34)  SpO2: 99% (2017 06:38) (85% - 100%)  Height (cm): 162.56 ( @ 05:09)  Weight (kg): 63.5 ( @ 05:09)  BMI (kg/m2): 24 ( @ 05:09)  BSA (m2): 1.68 ( @ 05:09)      GEN: intubated   HEENT: normocephalic and atraumatic. Pupils unequal  left smaller than right pupil. Moist mucosa. intubted.  NECK: Supple. No carotid bruits.  No lymphadenopathy or thyromegaly.  LUNGS: coarse rales R>L  on  auscultation.  HEART: Regular rate and rhythm without murmur.  ABDOMEN: Soft, nontender, and nondistended.  Positive bowel sounds.  No hepatosplenomegaly was noted.  EXTREMITIES: Without any cyanosis, clubbing, rash + , right BKA  NEUROLOGIC: unresponsive    SKIN: papules dried scabs ? secondary to itching       Labs:                  10.3   4.7    )----------(  222       ( 2017 05:40 )               30.4      140    |  107    |  59     ----------------------------<  192        ( 2017 05:40 )  5.1     |  17     |  5.10     Ca    7.9        ( 2017 05:40 )  Mg     3.3       ( 2017 05:40 )    TPro  6.7    /  Alb  3.2    /  TBili  0.4    /  DBili  x      /  AST  23     /  ALT  24     /  AlkPhos  122    ( 2017 05:40 )    LIVER FUNCTIONS - ( 2017 05:40 )  Alb: 3.2 g/dL / Pro: 6.7 g/dL / ALK PHOS: 122 U/L / ALT: 24 U/L DA / AST: 23 U/L / GGT: x           PT/INR -  11.8 sec / 1.08 ratio   ( 2017 05:40 )       PTT -  36.1 sec   ( 2017 05:40 )  CAPILLARY BLOOD GLUCOSE    CARDIAC MARKERS ( 2017 05:40 )  .005 ng/mL / x     / x     / x     / 4.3 ng/mL      Urinalysis Basic - ( 2017 06:27 )    Color: Yellow / Appearance: x / S.020 / pH: x  Gluc: x / Ketone: Negative  / Bili: Negative / Urobili: Negative mg/dL   Blood: x / Protein: 500 mg/dL / Nitrite: Negative   Leuk Esterase: Trace / RBC: 6-10 /HPF / WBC 0-2   Sq Epi: x / Non Sq Epi: Moderate / Bacteria: Few      ABG - ( 2017 06:23 )  pH:  7.253     /  pCO2: 37    /  pO2: 54    / HCO3: 16    / Base Excess: -9.8  /  SaO2: 82          RECENT CULTURES:          RADIOLOGY & ADDITIONAL STUDIES:     Xray Chest 1 View AP- PORTABLE-Urgent (17 @ 06:02) >    INTERPRETATION:  ETT above the wing. Patchy opacification of the right   hemithorax which may represent infection, hemmorhage, or contusion. Small   right pleural effusion.        Assessment :     62 year old male hx of ? COPD, ETOH abuse, PVD, s/p right BKA , ? CKD vs EDWIN admitted with acuter cardiorespiratory arrest , primarily respiratory failure leading to cardiac arrest , has unequal pupils raising the possibility of a acute cerebral event .    CXR shows extensive right sided infiltrates , has elevated lactate and severe metabolic acidosis    Plan :   - get stat CT head and ct chest non contrast  - adjust antibiotics to renal dose   - get renal , pulmonary , cardiology and neurology consults   - full sepsis work up   - cardiac enzymes   - serial lactate and trend CBC  - obtain records geo [facility] , discussed with Dr. CLARY Casas     Continue with present regime .  Appropriate use of antibiotics and adverse effects reviewed.        > 45 minutes spent in direct patient care reviewing  the notes, lab data/ imaging , discussion with multidisciplinary team. All questions were addressed and answered to the best of my capacity .    Thank you for allowing me to participate in the care of your patient .

## 2017-11-30 NOTE — PROGRESS NOTE ADULT - SUBJECTIVE AND OBJECTIVE BOX
ICU Progress Note    HPI:    S:    Pt seen and examined  HD # 1  Pt here for NH ([facility]) for hypoxia  Cardiac arrest in ER 2/2 acute hypoxic resp failure  Intubated  Concern for R PNA on CXR  PMHx IDDM, alcoholism, PAD, CKD stage 4 (takes PO bicarb as outpt), anemia, HTN    Remains intubated  Moving all extremities  K+ peaked at 5.5, now trending down  La peaked ~4, now coming down      Allergies    No Known Allergies    Intolerances        MEDICATIONS  (STANDING):  ALBUTerol    90 MICROgram(s) HFA Inhaler 2 Puff(s) Inhalation every 6 hours  dextrose 5%. 1000 milliLiter(s) (50 mL/Hr) IV Continuous <Continuous>  dextrose 50% Injectable 12.5 Gram(s) IV Push once  dextrose 50% Injectable 25 Gram(s) IV Push once  dextrose 50% Injectable 25 Gram(s) IV Push once  heparin  Injectable 5000 Unit(s) SubCutaneous every 8 hours  influenza   Vaccine 0.5 milliLiter(s) IntraMuscular once  insulin lispro (HumaLOG) corrective regimen sliding scale   SubCutaneous every 6 hours  pantoprazole  Injectable 40 milliGRAM(s) IV Push daily  piperacillin/tazobactam IVPB. 3.375 Gram(s) IV Intermittent every 12 hours  propofol Infusion 5 MICROgram(s)/kG/Min (1.905 mL/Hr) IV Continuous <Continuous>  sodium chloride 0.45% 1000 milliLiter(s) (75 mL/Hr) IV Continuous <Continuous>    MEDICATIONS  (PRN):  dextrose Gel 1 Dose(s) Oral once PRN Blood Glucose LESS THAN 70 milliGRAM(s)/deciliter  fentaNYL    Injectable 25 MICROGram(s) IV Push every 2 hours PRN pain, sedation, resp distress, ventilated pt  glucagon  Injectable 1 milliGRAM(s) IntraMuscular once PRN Glucose LESS THAN 70 milligrams/deciliter      Drug Dosing Weight  Height (cm): 162.56 (2017 08:36)  Weight (kg): 63.5 (2017 08:36)  BMI (kg/m2): 24 (2017 08:36)  BSA (m2): 1.68 (2017 08:36)    BARAJAS: [x ] YES [ ] NO        PAST MEDICAL & SURGICAL HISTORY:  Peripheral Arterial Disease  Controlled Type 2 Diabetes with Leg or Foot Ulcer  ETOH Abuse  Amputation, Below Knee, Unilateral, Traumatic      FAMILY HISTORY:          ROS: See HPI; otherwise, all systems reviewed and negative.    O:    ICU Vital Signs Last 24 Hrs  T(C): 37.2 (2017 20:02), Max: 37.4 (2017 16:05)  T(F): 99 (2017 20:02), Max: 99.3 (2017 16:05)  HR: 57 (2017 22:00) (40 - 124)  BP: 139/53 (2017 22:00) (101/49 - 196/62)  BP(mean): 78 (2017 22:00) (66 - 97)  ABP: --  ABP(mean): --  RR: 22 (2017 22:00) (5 - 34)  SpO2: 100% (2017 22:00) (85% - 100%)      ABG - ( 2017 12:29 )  pH: x     /  pCO2: 26    /  pO2: 191   / HCO3: 17    / Base Excess: -9.8  /  SaO2: 100                 I&O's Detail    2017 07:01  -  2017 07:00  --------------------------------------------------------  IN:    IV PiggyBack: 350 mL    Sodium Chloride 0.9% IV Bolus: 2000 mL  Total IN: 2350 mL    OUT:  Total OUT: 0 mL    Total NET: 2350 mL      2017 07:01  -  2017 22:40  --------------------------------------------------------  IN:    IV PiggyBack: 100 mL    propofol Infusion: 62.7 mL    sodium chloride 0.45%: 600 mL    sodium chloride 0.9%: 450 mL  Total IN: 1212.7 mL    OUT:    Indwelling Catheter - Urethral: 100 mL  Total OUT: 100 mL    Total NET: 1112.7 mL          Mode: AC/ CMV (Assist Control/ Continuous Mandatory Ventilation)  RR (machine): 22  TV (machine): 450  FiO2: 60  PEEP: 7  ITime: 1  MAP: 11  PIP: 22      PE:    Constitutional: Adult M lying in bed in NAD.   Neck: No JVD, trachea midline. ETT in place.   Respiratory: CTA B/L good BS B/L no W/R/R.  Cardiovascular: S1S2+ RRR no M/R/G.  Gastrointestinal: Soft, NTND.  Extremities: No peripheral edema, No cyanosis, clubbing.  Neurological: Intubated, sedated, but opening eyes and PEDERSON.  Skin: No rashes, warm, moist.    LABS:    CBC Full  -  ( 2017 05:40 )  WBC Count : 4.7 K/uL  Hemoglobin : 10.3 g/dL  Hematocrit : 30.4 %  Platelet Count - Automated : 222 K/uL  Mean Cell Volume : 93.8 fl  Mean Cell Hemoglobin : 31.8 pg  Mean Cell Hemoglobin Concentration : 34.0 gm/dL  Auto Neutrophil # : 3.6 K/uL  Auto Lymphocyte # : 0.7 K/uL  Auto Monocyte # : 0.3 K/uL  Auto Eosinophil # : 0.0 K/uL  Auto Basophil # : 0.1 K/uL  Auto Neutrophil % : 76.6 %  Auto Lymphocyte % : 14.8 %  Auto Monocyte % : 06.7 %  Auto Eosinophil % : 0.7 %  Auto Basophil % : 1.3 %        145  |  110<H>  |  56<H>  ----------------------------<  141<H>  5.4<H>   |  19<L>  |  4.82<H>    Ca    7.6<L>      2017 16:57  Mg     3.3         TPro  6.7  /  Alb  3.2<L>  /  TBili  0.4  /  DBili  x   /  AST  23  /  ALT  24  /  AlkPhos  122<H>      PT/INR - ( 2017 05:40 )   PT: 11.8 sec;   INR: 1.08 ratio         PTT - ( 2017 05:40 )  PTT:36.1 sec  Urinalysis Basic - ( 2017 06:27 )    Color: Yellow / Appearance: Slightly Turbid / S.020 / pH: x  Gluc: x / Ketone: Negative  / Bili: Negative / Urobili: Negative mg/dL   Blood: x / Protein: 500 mg/dL / Nitrite: Negative   Leuk Esterase: Trace / RBC: 6-10 /HPF / WBC 0-2   Sq Epi: x / Non Sq Epi: Moderate / Bacteria: Few      CAPILLARY BLOOD GLUCOSE      POCT Blood Glucose.: 142 mg/dL (2017 17:20)  POCT Blood Glucose.: 138 mg/dL (2017 12:11)  POCT Blood Glucose.: 146 mg/dL (2017 05:28)    CARDIAC MARKERS ( 2017 05:40 )  .005 ng/mL / x     / x     / x     / 4.3 ng/mL      LIVER FUNCTIONS - ( 2017 05:40 )  Alb: 3.2 g/dL / Pro: 6.7 g/dL / ALK PHOS: 122 U/L / ALT: 24 U/L DA / AST: 23 U/L / GGT: x

## 2017-11-30 NOTE — PROGRESS NOTE ADULT - ASSESSMENT
A:    62yMale  HD # 1    Here for:    1. Cardiac arrest 2/2  2. Acute hypoxic resp failure, with  3. PNA  4. EDWIN in setting of  5. CRF, stage 4  6. Hyperkalemia    This patient requires critical care for support of one or more vital organ systems with a high probability of imminent or life threatening deterioration in his/her condition    P:    Neuro: Analgosedation.    CV: Continue hemodynamic monitoring.     Pulm: Cont MV, pulmonary toileting. Peridex. Nebs.     GI/Nutrition: NPO.     /Renal: Monitor UOP. Monitor BMP.  Replete Lytes as needed. Cont alkali therapy.     HEME- Chemical and mechanical DVT ppx. f/u CBC. f/u coags as needed.    ID:  Cont abx. f/u Cx's.    Lines/Tubes: PIV, ckoer.    Endo: Maintain euglycemia, ISS.    Skin:  Cont skin care, pressure ulcer prevention.    Dispo: Cont critical care.    TOTAL CRITICAL CARE TIME: 37  (EXCLUSIVE of any non bundled procedures)    Note: This time spent INCLUDES time spent directly as this patient's bedside with evaluation, review of chart including review of laboratory and imaging studies, interpretation of vital signs and cardiac output measurements, any necessary ventilator management, and time spent discussing plan of care with patient and family, including goals of care discussion.

## 2017-11-30 NOTE — CONSULT NOTE ADULT - PROBLEM SELECTOR RECOMMENDATION 9
S/p resuscitated cardiac arrest; primary cardiac etiology seems less likely in the absence of previous heart disease, normal LV function on echocardiogram, and normal serial cardiac biomarkers; elevated proBNP not reliable in setting of acute renal failure. I favor continued treatment for possible pneumonia, supportive care, observation on telemetry monitor.

## 2017-11-30 NOTE — CONSULT NOTE ADULT - SUBJECTIVE AND OBJECTIVE BOX
Date/Time Patient Seen:  		  Referring MD:   Data Reviewed	       Patient is a 62y old  Male who presents with a chief complaint of resp failure      Subjective/HPI    seen and examined, ventilated, on ABX, on sedation, hx obtained from the record,     62 year old male hx of ? COPD, ETOH abuse, PVD, s/p right BKA , ? CKD vs EDWIN , DM admitted with acuter cardiorespiratory arrest , primarily respiratory failure leading to cardiac arrest. Was sent to ER with increasing SOB and ams . He was hypoxic  on admission   . He was in moderate respiratory distress after suctioning went into cardiac arrest .      PAST MEDICAL & SURGICAL HISTORY:  Peripheral Arterial Disease  Controlled Type 2 Diabetes with Leg or Foot Ulcer  ETOH Abuse  Amputation, Below Knee, Unilateral, Traumatic  Amputation, Below Elbow, Unilateral, Traumatic        Medication list         MEDICATIONS  (STANDING):  ALBUTerol    90 MICROgram(s) HFA Inhaler 2 Puff(s) Inhalation every 6 hours  heparin  Injectable 5000 Unit(s) SubCutaneous every 8 hours  pantoprazole  Injectable 40 milliGRAM(s) IV Push daily  piperacillin/tazobactam IVPB. 3.375 Gram(s) IV Intermittent every 12 hours  propofol Infusion 5 MICROgram(s)/kG/Min (1.905 mL/Hr) IV Continuous <Continuous>  sodium chloride 0.9%. 1000 milliLiter(s) (75 mL/Hr) IV Continuous <Continuous>    MEDICATIONS  (PRN):  fentaNYL    Injectable 25 MICROGram(s) IV Push every 2 hours PRN pain, sedation, resp distress, ventilated pt         Vitals log        ICU Vital Signs Last 24 Hrs  T(C): 35.6 (30 Nov 2017 05:09), Max: 35.6 (30 Nov 2017 05:09)  T(F): 96 (30 Nov 2017 05:09), Max: 96 (30 Nov 2017 05:09)  HR: 60 (30 Nov 2017 07:30) (40 - 124)  BP: 134/44 (30 Nov 2017 07:30) (110/62 - 196/62)  BP(mean): --  ABP: --  ABP(mean): --  RR: 31 (30 Nov 2017 07:30) (20 - 34)  SpO2: 90% (30 Nov 2017 07:30) (85% - 100%)       Mode: AC/ CMV (Assist Control/ Continuous Mandatory Ventilation)  RR (machine): 22  TV (machine): 450  FiO2: 100  PEEP: 10  ITime: 1  MAP: 11  PIP: 32      Input and Output:  I&O's Detail    29 Nov 2017 07:01  -  30 Nov 2017 07:00  --------------------------------------------------------  IN:    IV PiggyBack: 350 mL    Sodium Chloride 0.9% IV Bolus: 2000 mL  Total IN: 2350 mL    OUT:  Total OUT: 0 mL    Total NET: 2350 mL          Lab Data                        10.3   4.7   )-----------( 222      ( 30 Nov 2017 05:40 )             30.4     11-30    140  |  107  |  59<H>  ----------------------------<  192<H>  5.1   |  17<L>  |  5.10<H>    Ca    7.9<L>      30 Nov 2017 05:40  Mg     3.3     11-30    TPro  6.7  /  Alb  3.2<L>  /  TBili  0.4  /  DBili  x   /  AST  23  /  ALT  24  /  AlkPhos  122<H>  11-30    ABG - ( 30 Nov 2017 06:23 )  pH: x     /  pCO2: 37    /  pO2: 54    / HCO3: 16    / Base Excess: -9.8  /  SaO2: 82          smoker  drinker  previous admission at [place] for [facility]HRE[facility]        CARDIAC MARKERS ( 30 Nov 2017 05:40 )  .005 ng/mL / x     / x     / x     / 4.3 ng/mL        Review of Systems	      Objective     Physical Examination    R BKA  lung dec bs  abd soft  et tube in  head at  heart s1s2      Pertinent Lab findings & Imaging      Lizette:  NO   Adequate UO     I&O's Detail    29 Nov 2017 07:01  -  30 Nov 2017 07:00  --------------------------------------------------------  IN:    IV PiggyBack: 350 mL    Sodium Chloride 0.9% IV Bolus: 2000 mL  Total IN: 2350 mL    OUT:  Total OUT: 0 mL    Total NET: 2350 mL               Discussed with:     Cultures:	        Radiology

## 2017-11-30 NOTE — CONSULT NOTE ADULT - SUBJECTIVE AND OBJECTIVE BOX
62M with trevor PMHx resident of [facility], sent to [place] ER for c/o hypoxia. In ER Pt became acutely unresponsive, asystole on monitor. CPR initiated, Pt received 1 amp of EPI, ~2 minutes of chest compressions with ROSC. Pt intubated. CXR prior to cardiac arrest revealed large right PNA. Awaiting PMHx information to be faxed over by [facility].      PAST MEDICAL & SURGICAL HISTORY:  Peripheral Arterial Disease  Controlled Type 2 Diabetes with Leg or Foot Ulcer  ETOH Abuse  Amputation, Below Knee, Unilateral, Traumatic      Review of Systems:  unable to obtain    T(F): 96 (11-30-17 @ 05:09), Max: 96 (11-30-17 @ 05:09)  HR: 109 (11-30-17 @ 05:33) (40 - 109)  BP: 175/64 (11-30-17 @ 05:33) (175/64 - 196/62)  RR: 20 (11-30-17 @ 05:33) (20 - 24)  SpO2: 100% (11-30-17 @ 05:33) (85% - 100%)  Wt(kg): --        CAPILLARY BLOOD GLUCOSE      POCT Blood Glucose.: 146 mg/dL (30 Nov 2017 05:28)      I&O's Summary      Physical Exam:     Gen: intubated  Neuro: unresponsive  HEENT: NC/AT  Resp: rhodochrous BS R>L  CVS: nl S1/S2, RRR  Abd: soft, nt, nd, +bs  Ext: Left bka. no edema, +pulses  Skin: well perfused, warm    Meds:    piperacillin/tazobactam IVPB. IV Intermittent  vancomycin  IVPB IV Intermittent  sodium chloride 0.9% Bolus IV Bolus  sodium chloride 0.9%. IV Continuous          PT/INR - ( 30 Nov 2017 05:40 )   PT: 11.8 sec;   INR: 1.08 ratio         PTT - ( 30 Nov 2017 05:40 )  PTT:36.1 sec        CXR:  large right pna    CENTRAL LINE: no    BARAJAS: Yes    A-LINE: no    GLOBAL ISSUE/BEST PRACTICE:  Analgesia: yes  Sedation: yes  HOB elevation: yes  Stress ulcer prophylaxis: yes  VTE prophylaxis: yes  Glycemic control: yes  Nutrition: npo      CODE STATUS: Full  GOC discussion: Y  Critical care time spent (mins): 35  (Reviewing data, imaging, discussing with multidisciplinary team, non inclusive of procedures, discussing goals of care with patient/family)

## 2017-11-30 NOTE — CONSULT NOTE ADULT - SUBJECTIVE AND OBJECTIVE BOX
Patient is a 62y old  Male who presents with a chief complaint of c/o SOB, respiratory distress, s/p cardiopulmonary arrest (2017 10:32)    HPI:  61 y/o male with PMH of PVD s/p left TKA, ETOH, ? COPD, ? ckd was sent from [facility] for evaluation of low Pulse ox.  Pt had cardiopulmonary arrest in ED due to acute respiratory failure. .Pt is intubated, +coker cath. ? aspiration - empirically on antibiotics. elevated lactate4.8 on admission which is trending down.  High BNP. CXR -right sided. patchy opacification. ECHO- normal as per cardiology. unequal pupils- right- 4mmand left 2mm. CAT head- no acute event. elevated BNP. (2017 10:32)  Pt now in SPCU Intubated and awake.  No seizure reported.    PAST MEDICAL & SURGICAL HISTORY:  Peripheral Arterial Disease  Controlled Type 2 Diabetes with Leg or Foot Ulcer  ETOH Abuse  Amputation, Below Knee, Unilateral, Traumatic    MEDICATIONS  (STANDING):  ALBUTerol    90 MICROgram(s) HFA Inhaler 2 Puff(s) Inhalation every 6 hours  dextrose 5%. 1000 milliLiter(s) (50 mL/Hr) IV Continuous <Continuous>  dextrose 50% Injectable 12.5 Gram(s) IV Push once  dextrose 50% Injectable 25 Gram(s) IV Push once  dextrose 50% Injectable 25 Gram(s) IV Push once  heparin  Injectable 5000 Unit(s) SubCutaneous every 8 hours  influenza   Vaccine 0.5 milliLiter(s) IntraMuscular once  insulin lispro (HumaLOG) corrective regimen sliding scale   SubCutaneous every 6 hours  pantoprazole  Injectable 40 milliGRAM(s) IV Push daily  piperacillin/tazobactam IVPB. 3.375 Gram(s) IV Intermittent every 12 hours  propofol Infusion 5 MICROgram(s)/kG/Min (1.905 mL/Hr) IV Continuous <Continuous>  sodium chloride 0.9%. 1000 milliLiter(s) (75 mL/Hr) IV Continuous <Continuous>    MEDICATIONS  (PRN):  dextrose Gel 1 Dose(s) Oral once PRN Blood Glucose LESS THAN 70 milliGRAM(s)/deciliter  fentaNYL    Injectable 25 MICROGram(s) IV Push every 2 hours PRN pain, sedation, resp distress, ventilated pt  glucagon  Injectable 1 milliGRAM(s) IntraMuscular once PRN Glucose LESS THAN 70 milligrams/deciliter    Allergies    No Known Allergies    SOCIAL HISTORY:    Ex Smoker. Quite in past.  Pt drank alcohol heavily.    FAMILY HISTORY: Non contributory per chart.      REVIEW OF SYSTEMS:    CONSTITUTIONAL: No fever  EYES: No eye pain,   ENMT:  Intubated on vent.  NECK: Intubated on vent.  RESPIRATORY: Intubated on vent.  CARDIOVASCULAR: No acute chest pain, palpitations,  or leg swelling  GASTROINTESTINAL: No abdominal pain. No nausea, vomiting, or hematemesis;  No melena or hematochezia.  GENITOURINARY: No  hematuria, or incontinence  MUSCULOSKELETAL: H/o Left BKA.  SKIN: No itching, rashes, or lesions   LYMPH NODES: No enlarged glands  NEUROLOGICAL: No headaches, memory loss,   PSYCHIATRIC: No depression, anxiety, mood swings, or difficulty sleeping  ENDOCRINE: No heat or cold intolerance;   HEME/LYMPH: No easy bruising, or bleeding gums  Allergy/Immunology. No medication allergy. No seasonal allergies.    PHYSICAL EXAM:  Vital Signs Last 24 Hrs  T(F): 99 (17 @ 12:02)  HR: 54 (17 @ 12:52)  BP: 139/81 (17 @ 12:00)  RR: 20 (17 @ 12:00)    GENERAL: NAD, well-groomed, well-developed  HEAD:  Atraumatic, Normocephalic  EYES: EOMI, PERRLA, conjunctiva and sclera clear  NECK: Supple, No JVD, thyroid non-palpable    On Neurological Examination:    Intubated on vent.  Open eyes upon touch. Able to make eye contact.  Follows simple commands.  Left Pupil 2 mm, irregular. sluggishly reacting to light. Right pupil is 3.5 mm, sluggishly reacting to light.  No obvious facial asymmetry.  Moves b/l UE slightly but equally.  Left BKA.  Withdraws leg on plantar on stimulation.  Neck is supple.  No seizure or tremors/Jerks seen.    LABS:                        10.3   4.7   )-----------( 222      ( 2017 05:40 )             30.4         140  |  107  |  55<H>  ----------------------------<  135<H>  5.5<H>   |  16<L>  |  4.69<H>    Ca    7.6<L>      2017 12:06  Mg     3.3         TPro  6.7  /  Alb  3.2<L>  /  TBili  0.4  /  DBili  x   /  AST  23  /  ALT  24  /  AlkPhos  122<H>      PT/INR - ( 2017 05:40 )   PT: 11.8 sec;   INR: 1.08 ratio         PTT - ( 2017 05:40 )  PTT:36.1 sec  Urinalysis Basic - ( 2017 06:27 )    Color: Yellow / Appearance: Slightly Turbid / S.020 / pH: x  Gluc: x / Ketone: Negative  / Bili: Negative / Urobili: Negative mg/dL   Blood: x / Protein: 500 mg/dL / Nitrite: Negative   Leuk Esterase: Trace / RBC: 6-10 /HPF / WBC 0-2   Sq Epi: x / Non Sq Epi: Moderate / Bacteria: Few    RADIOLOGY & ADDITIONAL STUDIES:    < from: CT Head No Cont (17 @ 08:24) >  EXAM:  CT BRAIN                          PROCEDURE DATE:  2017      INTERPRETATION:  Medical information: Cardiac arrest, unequal pupils.    Comparison study dated 2012    Axial images obtained, coronal and sagittal images computer reformatted.   Patient motion present on some of the images.    Atrophic changes present. Periventricular white matter hypoattenuation,   microvascular ischemic change. 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 Old   left frontal infarct present. Bilateral lacuna infarcts in the basal   ganglion, corona radiata and thalami. No evidence of hemorrhage, mass   effect, midline shift, epidural, or subdural collection.    No unusual calcifications present. Calcifications visible the tentorium.   Calvarium intact. No fluid levels in visualized paranasal sinuses.    IMPRESSION: No evidence of hemorrhage or mass effect.    SARAN BREWSTER M.D.,ATTENDING RADIOLOGIST  This document has been electronically signed. 2017  8:37AM      < end of copied text >    < from: CT Chest No Cont (17 @ 08:25) >  EXAM:  CT CHEST                                  PROCEDURE DATE:  2017          INTERPRETATION:  Clinical information: Pneumonia, respiratory failure.    No prior chest CT studies present for comparison    Axial images obtained, coronal and sagittal images computer reformatted.   Noncontrast exam limits evaluation. Patient motion artifact present.    Small bilateral pleural effusions.   Bilateral airspace disease present. Airspace disease present in the right   upper lobe right middle lobe and right lower lobe. Airspace disease   visible in the left lower lobe. No mediastinal lesions evident. Hilar   regions obscured by bilateral lung pathology.    Cardiomegaly and coronary artery calcifications present. No evidence of   pericardial effusion. No evidence of thoracic aortic aneurysm. The   central airway is intact. An endotracheal tube is present. No adrenal   lesions. The spleen is not enlarged. No acute osseous abnormalities    IMPRESSION:    See above report.    SARAN BREWSTER M.D.,ATTENDING RADIOLOGIST  This document has been electronically signed. 2017  9:43AM           < end of copied text >

## 2017-11-30 NOTE — H&P ADULT - HISTORY OF PRESENT ILLNESS
63 y/o male with PMH of PVD s/p left TKA, ETOH, ? COPD, ? ckd was sent from [facility] for evaluation of low Pulse ox.  Pt had cardiopulmonary arrest in ED due to acute respiratory failure. .Pt is intubated, +coker cath. ? aspiration - empirically on antibiotics. elevated lactate4.8 on admission which is trending down.  High BNP. CXR -right sided. patchy opacification. ECHO- normal as per cardiology. unequal pupils- right- 4mmand left 2mm. CAT head- no acute event. 63 y/o male with PMH of PVD s/p left TKA, ETOH, ? COPD, ? ckd was sent from [facility] for evaluation of low Pulse ox.  Pt had cardiopulmonary arrest in ED due to acute respiratory failure. .Pt is intubated, +coker cath. ? aspiration - empirically on antibiotics. elevated lactate4.8 on admission which is trending down.  High BNP. CXR -right sided. patchy opacification. ECHO- normal as per cardiology. unequal pupils- right- 4mmand left 2mm. CAT head- no acute event. elevated BNP. 63 y/o male with PMH of PVD s/p left BKA, ETOH, ? COPD, ? ckd was sent from [facility] for evaluation of low Pulse ox.  Pt had cardiopulmonary arrest in ED due to acute respiratory failure. .Pt is intubated, +coker cath. ? aspiration - empirically on antibiotics. elevated lactate4.8 on admission which is trending down.  High BNP. CXR -right sided. patchy opacification. ECHO- normal as per cardiology. unequal pupils- right- 4mmand left 2mm. CAT head- no acute event. elevated BNP.

## 2017-11-30 NOTE — CONSULT NOTE ADULT - SUBJECTIVE AND OBJECTIVE BOX
Patient is a 62y old  Male who presents with a chief complaint of c/o SOB, respiratory distress, s/p cardiopulmonary arrest (2017 10:32)    HPI:  61 y/o male with PMH of PVD s/p left BKA, ETOH, ? COPD, ? ckd was sent from [facility] for evaluation of low Pulse ox.  Pt had cardiopulmonary arrest in ED due to acute respiratory failure. .Pt is intubated, +coker cath. ? aspiration - empirically on antibiotics. elevated lactate4.8 on admission which is trending down.  High BNP. CXR -right sided. patchy opacification. ECHO- normal as per cardiology. unequal pupils- right- 4mmand left 2mm. CAT head- no acute event. elevated BNP. (2017 10:32)    Renal consult called for elevated bun/ creatinine. Pt on vent support. poor Uo.     PAST MEDICAL HISTORY:  Peripheral Arterial Disease  Controlled Type 2 Diabetes with Leg or Foot Ulcer  ETOH Abuse      PAST SURGICAL HISTORY:  Amputation, Below Knee, Unilateral, Traumatic  Amputation, Below Elbow, Unilateral, Traumatic      FAMILY HISTORY:      SOCIAL HISTORY: ?     Allergies    No Known Allergies    Intolerances      Home Medications:  DermaPhor topical ointment:  (2017 07:23)  ferrous sulfate 325 mg (65 mg elemental iron) oral delayed release tablet: 1 tab(s) orally once a day (2017 07:23)  HumaLOG KwikPen:  (2017 07:23)  hydrALAZINE: 75 milligram(s) orally every 12 hours (2017 07:23)  Metoprolol Tartrate 25 mg oral tablet: orally every 8 hours (2017 07:23)  Norvasc 5 mg oral tablet: 1 tab(s) orally once a day (2017 07:23)  Procrit 3000 units/mL injectable solution: injectable 3 times a week (2017 07:23)  sodium bicarbonate 650 mg oral tablet: orally once a day (2017 07:23)    MEDICATIONS  (STANDING):  ALBUTerol    90 MICROgram(s) HFA Inhaler 2 Puff(s) Inhalation every 6 hours  dextrose 5%. 1000 milliLiter(s) (50 mL/Hr) IV Continuous <Continuous>  dextrose 50% Injectable 12.5 Gram(s) IV Push once  dextrose 50% Injectable 25 Gram(s) IV Push once  dextrose 50% Injectable 25 Gram(s) IV Push once  heparin  Injectable 5000 Unit(s) SubCutaneous every 8 hours  influenza   Vaccine 0.5 milliLiter(s) IntraMuscular once  insulin lispro (HumaLOG) corrective regimen sliding scale   SubCutaneous every 6 hours  pantoprazole  Injectable 40 milliGRAM(s) IV Push daily  piperacillin/tazobactam IVPB. 3.375 Gram(s) IV Intermittent every 12 hours  propofol Infusion 5 MICROgram(s)/kG/Min (1.905 mL/Hr) IV Continuous <Continuous>  sodium chloride 0.9%. 1000 milliLiter(s) (75 mL/Hr) IV Continuous <Continuous>    MEDICATIONS  (PRN):  dextrose Gel 1 Dose(s) Oral once PRN Blood Glucose LESS THAN 70 milliGRAM(s)/deciliter  fentaNYL    Injectable 25 MICROGram(s) IV Push every 2 hours PRN pain, sedation, resp distress, ventilated pt  glucagon  Injectable 1 milliGRAM(s) IntraMuscular once PRN Glucose LESS THAN 70 milligrams/deciliter      REVIEW OF SYSTEMS:  Unable to obtain    T(F): 99 (17 @ 12:02), Max: 99 (17 @ 12:02)  HR: 53 (17 @ 13:00) (40 - 124)  BP: 110/48 (17 @ 13:00) (110/48 - 196/62)  RR: 5 (17 @ 13:00) (5 - 34)  SpO2: 98% (17 @ 13:00) (85% - 100%)  Wt(kg): --    PHYSICAL EXAM:  General: on vent  Respiratory: b/l air entry  Cardiovascular: S1 S2  Gastrointestinal: soft  Extremities: no edema    Mode: AC/ CMV (Assist Control/ Continuous Mandatory Ventilation)  RR (machine): 22  TV (machine): 450  FiO2: 100  PEEP: 10  ITime: 1  MAP: 14  PIP: 21          140  |  107  |  55<H>  ----------------------------<  135<H>  5.5<H>   |  16<L>  |  4.69<H>    Ca    7.6<L>      2017 12:06  Mg     3.3     -    TPro  6.7  /  Alb  3.2<L>  /  TBili  0.4  /  DBili  x   /  AST  23  /  ALT  24  /  AlkPhos  122<H>                            10.3   4.7   )-----------( 222      ( 2017 05:40 )             30.4       Blood Urea Nitrogen, Serum: 55 mg/dL ( @ 12:06)  Calcium, Total Serum: 7.6 mg/dL ( @ 12:06)  Potassium, Serum: 5.5 mmol/L ( @ 12:06)  Hemoglobin: 10.3 g/dL ( @ 05:40)      Creatinine, Serum: 4.69 ( @ 12:06)  Creatinine, Serum: 5.10 ( @ 05:40)      Urinalysis Basic - ( 2017 06:27 )    Color: Yellow / Appearance: Slightly Turbid / S.020 / pH: x  Gluc: x / Ketone: Negative  / Bili: Negative / Urobili: Negative mg/dL   Blood: x / Protein: 500 mg/dL / Nitrite: Negative   Leuk Esterase: Trace / RBC: 6-10 /HPF / WBC 0-2   Sq Epi: x / Non Sq Epi: Moderate / Bacteria: Few      LIVER FUNCTIONS - ( 2017 05:40 )  Alb: 3.2 g/dL / Pro: 6.7 g/dL / ALK PHOS: 122 U/L / ALT: 24 U/L DA / AST: 23 U/L / GGT: x           CARDIAC MARKERS ( 2017 05:40 )  .005 ng/mL / x     / x     / x     / 4.3 ng/mL            ABG - ( 2017 12:29 )  pH: x     /  pCO2: 26    /  pO2: 191   / HCO3: 17    / Base Excess: -9.8  /  SaO2: 100               I&O's Detail    2017 07:01  -  2017 07:00  --------------------------------------------------------  IN:    IV PiggyBack: 350 mL    Sodium Chloride 0.9% IV Bolus: 2000 mL  Total IN: 2350 mL    OUT:  Total OUT: 0 mL    Total NET: 2350 mL      2017 07:01   13:42  --------------------------------------------------------  IN:    propofol Infusion: 11.4 mL    sodium chloride 0.9%.: 450 mL  Total IN: 461.4 mL    OUT:  Total OUT: 0 mL    Total NET: 461.4 mL Patient is a 62y old  Male who presents with a chief complaint of c/o SOB, respiratory distress, s/p cardiopulmonary arrest (2017 10:32)    HPI:  63 y/o male with PMH of PVD s/p left BKA, ETOH, ? COPD, ? ckd was sent from [facility] for evaluation of low Pulse ox.  Pt had cardiopulmonary arrest in ED due to acute respiratory failure. .Pt is intubated, +coker cath. ? aspiration - empirically on antibiotics. elevated lactate4.8 on admission which is trending down.  High BNP. CXR -right sided. patchy opacification. ECHO- normal as per cardiology. unequal pupils- right- 4mmand left 2mm. CAT head- no acute event. elevated BNP. (2017 10:32)    Renal consult called for elevated bun/ creatinine. Pt on vent support. poor Uo.     PAST MEDICAL HISTORY:  Peripheral Arterial Disease  Controlled Type 2 Diabetes with Leg or Foot Ulcer  ETOH Abuse      PAST SURGICAL HISTORY:  Amputation, Below Knee, Unilateral, Traumatic      FAMILY HISTORY:      SOCIAL HISTORY: ?     Allergies    No Known Allergies    Intolerances      Home Medications:  DermaPhor topical ointment:  (2017 07:23)  ferrous sulfate 325 mg (65 mg elemental iron) oral delayed release tablet: 1 tab(s) orally once a day (2017 07:23)  HumaLOG KwikPen:  (2017 07:23)  hydrALAZINE: 75 milligram(s) orally every 12 hours (2017 07:23)  Metoprolol Tartrate 25 mg oral tablet: orally every 8 hours (2017 07:23)  Norvasc 5 mg oral tablet: 1 tab(s) orally once a day (2017 07:23)  Procrit 3000 units/mL injectable solution: injectable 3 times a week (2017 07:23)  sodium bicarbonate 650 mg oral tablet: orally once a day (2017 07:23)    MEDICATIONS  (STANDING):  ALBUTerol    90 MICROgram(s) HFA Inhaler 2 Puff(s) Inhalation every 6 hours  dextrose 5%. 1000 milliLiter(s) (50 mL/Hr) IV Continuous <Continuous>  dextrose 50% Injectable 12.5 Gram(s) IV Push once  dextrose 50% Injectable 25 Gram(s) IV Push once  dextrose 50% Injectable 25 Gram(s) IV Push once  heparin  Injectable 5000 Unit(s) SubCutaneous every 8 hours  influenza   Vaccine 0.5 milliLiter(s) IntraMuscular once  insulin lispro (HumaLOG) corrective regimen sliding scale   SubCutaneous every 6 hours  pantoprazole  Injectable 40 milliGRAM(s) IV Push daily  piperacillin/tazobactam IVPB. 3.375 Gram(s) IV Intermittent every 12 hours  propofol Infusion 5 MICROgram(s)/kG/Min (1.905 mL/Hr) IV Continuous <Continuous>  sodium chloride 0.9%. 1000 milliLiter(s) (75 mL/Hr) IV Continuous <Continuous>    MEDICATIONS  (PRN):  dextrose Gel 1 Dose(s) Oral once PRN Blood Glucose LESS THAN 70 milliGRAM(s)/deciliter  fentaNYL    Injectable 25 MICROGram(s) IV Push every 2 hours PRN pain, sedation, resp distress, ventilated pt  glucagon  Injectable 1 milliGRAM(s) IntraMuscular once PRN Glucose LESS THAN 70 milligrams/deciliter      REVIEW OF SYSTEMS:  Unable to obtain    T(F): 99 (17 @ 12:02), Max: 99 (17 @ 12:02)  HR: 53 (17 @ 13:00) (40 - 124)  BP: 110/48 (17 @ 13:00) (110/48 - 196/62)  RR: 5 (17 @ 13:00) (5 - 34)  SpO2: 98% (17 @ 13:00) (85% - 100%)  Wt(kg): --    PHYSICAL EXAM:  General: on vent  Respiratory: b/l air entry  Cardiovascular: S1 S2  Gastrointestinal: soft  Extremities: no edema, Left BKA    Mode: AC/ CMV (Assist Control/ Continuous Mandatory Ventilation)  RR (machine): 22  TV (machine): 450  FiO2: 100  PEEP: 10  ITime: 1  MAP: 14  PIP: 21          140  |  107  |  55<H>  ----------------------------<  135<H>  5.5<H>   |  16<L>  |  4.69<H>    Ca    7.6<L>      2017 12:06  Mg     3.3     -    TPro  6.7  /  Alb  3.2<L>  /  TBili  0.4  /  DBili  x   /  AST  23  /  ALT  24  /  AlkPhos  122<H>                            10.3   4.7   )-----------( 222      ( 2017 05:40 )             30.4       Blood Urea Nitrogen, Serum: 55 mg/dL ( @ 12:06)  Calcium, Total Serum: 7.6 mg/dL ( @ 12:06)  Potassium, Serum: 5.5 mmol/L ( @ 12:06)  Hemoglobin: 10.3 g/dL ( @ 05:40)      Creatinine, Serum: 4.69 ( @ 12:06)  Creatinine, Serum: 5.10 ( @ 05:40)      Urinalysis Basic - ( 2017 06:27 )    Color: Yellow / Appearance: Slightly Turbid / S.020 / pH: x  Gluc: x / Ketone: Negative  / Bili: Negative / Urobili: Negative mg/dL   Blood: x / Protein: 500 mg/dL / Nitrite: Negative   Leuk Esterase: Trace / RBC: 6-10 /HPF / WBC 0-2   Sq Epi: x / Non Sq Epi: Moderate / Bacteria: Few      LIVER FUNCTIONS - ( 2017 05:40 )  Alb: 3.2 g/dL / Pro: 6.7 g/dL / ALK PHOS: 122 U/L / ALT: 24 U/L DA / AST: 23 U/L / GGT: x           CARDIAC MARKERS ( 2017 05:40 )  .005 ng/mL / x     / x     / x     / 4.3 ng/mL            ABG - ( 2017 12:29 )  pH: x     /  pCO2: 26    /  pO2: 191   / HCO3: 17    / Base Excess: -9.8  /  SaO2: 100               I&O's Detail    2017 07:01  -  2017 07:00  --------------------------------------------------------  IN:    IV PiggyBack: 350 mL    Sodium Chloride 0.9% IV Bolus: 2000 mL  Total IN: 2350 mL    OUT:  Total OUT: 0 mL    Total NET: 2350 mL      2017 07:01  -  2017 13:42  --------------------------------------------------------  IN:    propofol Infusion: 11.4 mL    sodium chloride 0.9%.: 450 mL  Total IN: 461.4 mL    OUT:  Total OUT: 0 mL    Total NET: 461.4 mL

## 2017-11-30 NOTE — ED ADULT NURSE NOTE - OBJECTIVE STATEMENT
pt awake on arrival; hypoxic; became unresponsive; asystolic;  CPR initiated; see cardiac arrest sheet; placed on vent @ 12/450/100%/5; Dr. Amaya in attendance

## 2017-11-30 NOTE — ED PROVIDER NOTE - CRITICAL CARE PROVIDED
documentation/consultation with other physicians/direct patient care (not related to procedure)/interpretation of diagnostic studies

## 2017-11-30 NOTE — CONSULT NOTE ADULT - PROBLEM SELECTOR RECOMMENDATION 3
Markedly elevated serum creatinine with no documented history of renal disease available; nephrology consult pending.

## 2017-11-30 NOTE — CONSULT NOTE ADULT - PROBLEM SELECTOR RECOMMENDATION 3
proventil  monitor sat  supportive care in ICU  vent support  ct chest reviewed  monitor cardiopulmonary status

## 2017-11-30 NOTE — ED ADULT NURSE NOTE - PATIENT PROVIDED WITH THE SEVEN POINTS OF INFORMATION ABOUT HIV REQUIRED BY THE PUBLIC HEALTH LAW
Care per Neurosurgery
R frontal vertex parasagittal gyrus small hemorrhage, stable on repeat CTH this am   f/u surgery/neurosx re: when to resume AC
As per EPS
Statement Selected

## 2017-11-30 NOTE — H&P ADULT - ASSESSMENT
61 y/o male with PMH of PVD s/p left TKA, ETOH, ? COPD, ? ckd was sent from [facility] for evaluation of low Pulse ox.  Pt had cardiopulmonary arrest in ED due to acute respiratory failure. .Pt is intubated, +coker cath. ? aspiration - empirically on antibiotics. elevated lactate-4.8 on admission which is trending down.  High BNP. CXR -right sided. patchy opacification. ECHO- normal as per cardiology. unequal pupils- right- 4mm and left 2mm. CAT head- no acute event. elevated Bun/Cr- 59/5.10. for abdominal sono.

## 2017-11-30 NOTE — ED PROVIDER NOTE - OBJECTIVE STATEMENT
Patient sent from [facility] with low pulse ox. Patient reportedly had no complaints at NH. Now unresponsive and unable to contribute to history. No report of CP, fever, cough

## 2017-11-30 NOTE — DIETITIAN INITIAL EVALUATION ADULT. - OTHER INFO
62M adm from [facility] c resp distress, s/p intubation and cardiac arrest c resuscitation; adm c ? asp pna. Pt referred for enteral nutrition evaluation. Pt also adm c EDWIN on CKD 4 (BUN/creat elevated, K+ elevated, phosphorus level pending, also c decreased urine output, eGFR 11 currently- followed by renal, bicarb to be added to IVF, bun/creat trending down.  Recommend renal based tube feeding formula at this time if EN is wished/medically feasible- please find complete recommendations below. Pt c hx T2DM c noted L BKA hx, current A1c pending. Pt on regular consistency, NCS pta; past hx of etoh use. Skin intact, landy score 10.

## 2017-11-30 NOTE — CONSULT NOTE ADULT - PROBLEM SELECTOR RECOMMENDATION 9
vent support  rpt ABG at noon  I and O  HOB elevation  suction PRN  oral hygiene  proventil via ET  keep sat > 88 pct

## 2017-11-30 NOTE — H&P ADULT - ATTENDING COMMENTS
Prognosis is grave.  message left to Zak Longoria's answering machine.  Critical time spent more then 60 minutes. elevated BNP- doubt CHF. cardiology - .  Prognosis is grave.  message left to Zak Longoria's answering machine.  Critical time spent more then 60 minutes. elevated BNP- ? CHF. cardiology - .  Prognosis is grave.  message left to Zak Longoria's answering machine.  Critical time spent more then 60 minutes.

## 2017-12-01 LAB
ANION GAP SERPL CALC-SCNC: 13 MMOL/L — SIGNIFICANT CHANGE UP (ref 5–17)
BUN SERPL-MCNC: 59 MG/DL — HIGH (ref 7–23)
CALCIUM SERPL-MCNC: 7.5 MG/DL — LOW (ref 8.4–10.5)
CHLORIDE SERPL-SCNC: 109 MMOL/L — HIGH (ref 96–108)
CO2 SERPL-SCNC: 22 MMOL/L — SIGNIFICANT CHANGE UP (ref 22–31)
CREAT SERPL-MCNC: 4.95 MG/DL — HIGH (ref 0.5–1.3)
CULTURE RESULTS: NO GROWTH — SIGNIFICANT CHANGE UP
GLUCOSE BLDC GLUCOMTR-MCNC: 104 MG/DL — HIGH (ref 70–99)
GLUCOSE BLDC GLUCOMTR-MCNC: 117 MG/DL — HIGH (ref 70–99)
GLUCOSE BLDC GLUCOMTR-MCNC: 161 MG/DL — HIGH (ref 70–99)
GLUCOSE BLDC GLUCOMTR-MCNC: 169 MG/DL — HIGH (ref 70–99)
GLUCOSE SERPL-MCNC: 110 MG/DL — HIGH (ref 70–99)
HBA1C BLD-MCNC: 5.7 % — HIGH (ref 4–5.6)
HCT VFR BLD CALC: 23.7 % — LOW (ref 39–50)
HGB BLD-MCNC: 7.7 G/DL — LOW (ref 13–17)
LACTATE SERPL-SCNC: 1.3 MMOL/L — SIGNIFICANT CHANGE UP (ref 0.7–2)
MAGNESIUM SERPL-MCNC: 2.9 MG/DL — HIGH (ref 1.6–2.6)
MCHC RBC-ENTMCNC: 30 PG — SIGNIFICANT CHANGE UP (ref 27–34)
MCHC RBC-ENTMCNC: 32.4 GM/DL — SIGNIFICANT CHANGE UP (ref 32–36)
MCV RBC AUTO: 92.7 FL — SIGNIFICANT CHANGE UP (ref 80–100)
PHOSPHATE SERPL-MCNC: 4.8 MG/DL — HIGH (ref 2.5–4.5)
PLATELET # BLD AUTO: 143 K/UL — LOW (ref 150–400)
POTASSIUM SERPL-MCNC: 5.1 MMOL/L — SIGNIFICANT CHANGE UP (ref 3.5–5.3)
POTASSIUM SERPL-SCNC: 5.1 MMOL/L — SIGNIFICANT CHANGE UP (ref 3.5–5.3)
RBC # BLD: 2.56 M/UL — LOW (ref 4.2–5.8)
RBC # FLD: 14.6 % — HIGH (ref 10.3–14.5)
SODIUM SERPL-SCNC: 144 MMOL/L — SIGNIFICANT CHANGE UP (ref 135–145)
SPECIMEN SOURCE: SIGNIFICANT CHANGE UP
WBC # BLD: 7.1 K/UL — SIGNIFICANT CHANGE UP (ref 3.8–10.5)
WBC # FLD AUTO: 7.1 K/UL — SIGNIFICANT CHANGE UP (ref 3.8–10.5)

## 2017-12-01 PROCEDURE — 71010: CPT | Mod: 26

## 2017-12-01 PROCEDURE — 99233 SBSQ HOSP IP/OBS HIGH 50: CPT

## 2017-12-01 RX ORDER — ALBUTEROL 90 UG/1
2.5 AEROSOL, METERED ORAL EVERY 8 HOURS
Qty: 0 | Refills: 0 | Status: DISCONTINUED | OUTPATIENT
Start: 2017-12-01 | End: 2017-12-04

## 2017-12-01 RX ORDER — AMLODIPINE BESYLATE 2.5 MG/1
5 TABLET ORAL DAILY
Qty: 0 | Refills: 0 | Status: DISCONTINUED | OUTPATIENT
Start: 2017-12-01 | End: 2017-12-08

## 2017-12-01 RX ORDER — SODIUM BICARBONATE 1 MEQ/ML
650 SYRINGE (ML) INTRAVENOUS
Qty: 0 | Refills: 0 | Status: DISCONTINUED | OUTPATIENT
Start: 2017-12-01 | End: 2017-12-02

## 2017-12-01 RX ORDER — INSULIN LISPRO 100/ML
VIAL (ML) SUBCUTANEOUS
Qty: 0 | Refills: 0 | Status: DISCONTINUED | OUTPATIENT
Start: 2017-12-01 | End: 2017-12-08

## 2017-12-01 RX ORDER — METOPROLOL TARTRATE 50 MG
25 TABLET ORAL EVERY 8 HOURS
Qty: 0 | Refills: 0 | Status: DISCONTINUED | OUTPATIENT
Start: 2017-12-01 | End: 2017-12-08

## 2017-12-01 RX ORDER — SODIUM CHLORIDE 9 MG/ML
1000 INJECTION, SOLUTION INTRAVENOUS
Qty: 0 | Refills: 0 | Status: DISCONTINUED | OUTPATIENT
Start: 2017-12-01 | End: 2017-12-02

## 2017-12-01 RX ORDER — CHLORHEXIDINE GLUCONATE 213 G/1000ML
15 SOLUTION TOPICAL
Qty: 0 | Refills: 0 | Status: DISCONTINUED | OUTPATIENT
Start: 2017-12-01 | End: 2017-12-03

## 2017-12-01 RX ADMIN — HEPARIN SODIUM 5000 UNIT(S): 5000 INJECTION INTRAVENOUS; SUBCUTANEOUS at 13:11

## 2017-12-01 RX ADMIN — PROPOFOL 1.91 MICROGRAM(S)/KG/MIN: 10 INJECTION, EMULSION INTRAVENOUS at 05:27

## 2017-12-01 RX ADMIN — PIPERACILLIN AND TAZOBACTAM 25 GRAM(S): 4; .5 INJECTION, POWDER, LYOPHILIZED, FOR SOLUTION INTRAVENOUS at 05:27

## 2017-12-01 RX ADMIN — AMLODIPINE BESYLATE 5 MILLIGRAM(S): 2.5 TABLET ORAL at 13:11

## 2017-12-01 RX ADMIN — HEPARIN SODIUM 5000 UNIT(S): 5000 INJECTION INTRAVENOUS; SUBCUTANEOUS at 05:27

## 2017-12-01 RX ADMIN — Medication 1: at 16:19

## 2017-12-01 RX ADMIN — Medication 25 MILLIGRAM(S): at 21:05

## 2017-12-01 RX ADMIN — ALBUTEROL 2 PUFF(S): 90 AEROSOL, METERED ORAL at 01:20

## 2017-12-01 RX ADMIN — SODIUM CHLORIDE 50 MILLILITER(S): 9 INJECTION, SOLUTION INTRAVENOUS at 16:21

## 2017-12-01 RX ADMIN — Medication 25 MILLIGRAM(S): at 13:11

## 2017-12-01 RX ADMIN — FENTANYL CITRATE 25 MICROGRAM(S): 50 INJECTION INTRAVENOUS at 00:00

## 2017-12-01 RX ADMIN — Medication 1: at 11:31

## 2017-12-01 RX ADMIN — ALBUTEROL 2.5 MILLIGRAM(S): 90 AEROSOL, METERED ORAL at 14:52

## 2017-12-01 RX ADMIN — CHLORHEXIDINE GLUCONATE 15 MILLILITER(S): 213 SOLUTION TOPICAL at 17:23

## 2017-12-01 RX ADMIN — PANTOPRAZOLE SODIUM 40 MILLIGRAM(S): 20 TABLET, DELAYED RELEASE ORAL at 11:31

## 2017-12-01 RX ADMIN — ALBUTEROL 2 PUFF(S): 90 AEROSOL, METERED ORAL at 07:57

## 2017-12-01 RX ADMIN — HEPARIN SODIUM 5000 UNIT(S): 5000 INJECTION INTRAVENOUS; SUBCUTANEOUS at 21:05

## 2017-12-01 RX ADMIN — SODIUM CHLORIDE 75 MILLILITER(S): 9 INJECTION, SOLUTION INTRAVENOUS at 05:29

## 2017-12-01 RX ADMIN — CHLORHEXIDINE GLUCONATE 15 MILLILITER(S): 213 SOLUTION TOPICAL at 05:28

## 2017-12-01 RX ADMIN — PIPERACILLIN AND TAZOBACTAM 25 GRAM(S): 4; .5 INJECTION, POWDER, LYOPHILIZED, FOR SOLUTION INTRAVENOUS at 17:23

## 2017-12-01 RX ADMIN — Medication 650 MILLIGRAM(S): at 17:23

## 2017-12-01 NOTE — PROGRESS NOTE ADULT - ASSESSMENT
·	EDWIN on CKD 4: ATN  ·	s/p cardiac arrest, on vent, ? Pneumonia  ·	Metaboic acidosis  ·	Hyperkalemia  ·	Diabetes    Stable renal function. Good UO. ON Bicarb drip. Pt confused and pulling on IV lines.   Encourage PO intake as tolerated. Will d/c IV bicarb. Add Po bicarb.    Pulmonary and cardiology follow up. Check cultures. IV abx as per ID.   Will follow electrolytes and renal function trend. ·	EDWIN on CKD 4: ATN  ·	s/p cardiac arrest, on vent, ? Pneumonia  ·	Metaboic acidosis  ·	Hyperkalemia  ·	Diabetes  ·	Anemia    Stable renal function. Good UO. ON Bicarb drip. Pt confused and pulling on IV lines.   Encourage PO intake as tolerated. Will d/c IV bicarb. Add Po bicarb.    Monitor h/h trend. ? PRBC transfusion.   Pulmonary and cardiology follow up. Check cultures. IV abx as per ID.   Will follow electrolytes and renal function trend.

## 2017-12-01 NOTE — PROGRESS NOTE ADULT - SUBJECTIVE AND OBJECTIVE BOX
JP SANDOVAL is a 62yMale , patient examined and chart reviewed. Patient being followed for possible HCAP , aspiration pneumonia following cardio respiratory arrest     INTERVAL HPI/ OVERNIGHT EVENTS: Events noted, remains on diprivan, more responsive as per RN, afebrile . Oliguria . Results of ct scan noted       Past Medical History--  PAST MEDICAL & SURGICAL HISTORY:  Peripheral Arterial Disease  Controlled Type 2 Diabetes with Leg or Foot Ulcer  ETOH Abuse  Amputation, Below Knee, Unilateral, Traumatic      For details regarding the patient's social history, family history, and other miscellaneous elements, please refer the initial infectious diseases consultation and/or the admitting history and physical examination for this admission.      ROS:  Unable to obtain due to : intubated, sedated     Allergies:      Current inpatient medications :    ANTIBIOTICS/RELEVANT:  piperacillin/tazobactam IVPB. 3.375 Gram(s) IV Intermittent every 12 hours      ALBUTerol    90 MICROgram(s) HFA Inhaler 2 Puff(s) Inhalation every 6 hours  chlorhexidine 0.12% Liquid 15 milliLiter(s) Swish and Spit two times a day  dextrose 5%. 1000 milliLiter(s) IV Continuous <Continuous>  dextrose 50% Injectable 12.5 Gram(s) IV Push once  dextrose 50% Injectable 25 Gram(s) IV Push once  dextrose 50% Injectable 25 Gram(s) IV Push once  dextrose Gel 1 Dose(s) Oral once PRN  fentaNYL    Injectable 25 MICROGram(s) IV Push every 2 hours PRN  glucagon  Injectable 1 milliGRAM(s) IntraMuscular once PRN  heparin  Injectable 5000 Unit(s) SubCutaneous every 8 hours  insulin lispro (HumaLOG) corrective regimen sliding scale   SubCutaneous every 6 hours  pantoprazole  Injectable 40 milliGRAM(s) IV Push daily  propofol Infusion 5 MICROgram(s)/kG/Min IV Continuous <Continuous>  sodium chloride 0.45% 1000 milliLiter(s) IV Continuous <Continuous>      Objective:     @ :  -   @ 07:00  --------------------------------------------------------  IN: 2099.8 mL / OUT: 450 mL / NET: 1649.8 mL      T(C): 36.8 (17 @ 04:07), Max: 37.4 (17 @ 16:05)  HR: 60 (17 @ 06:00) (46 - 68)  BP: 135/55 (17 @ 06:00) (101/49 - 159/86)  RR: 22 (17 @ 06:00) (5 - 31)  SpO2: 100% (17 @ 06:00) (90% - 100%)  Wt(kg): --  Mode: AC/ CMV (Assist Control/ Continuous Mandatory Ventilation), RR (machine): 22, TV (machine): 450, FiO2: 60, PEEP: 7, ITime: 1, MAP: 10, PIP: 28    Physical Exam:  GEN: sedated, intubated   HEENT: normocephalic and atraumatic. unequal pupils ET-tube and NGT + .  NECK: Supple. No carotid bruits.  No lymphadenopathy or thyromegaly.  LUNGS: Coarse rales R>L on auscultation.  HEART: Regular rate and rhythm without murmur.  ABDOMEN: Soft, nontender, and nondistended.  Positive bowel sounds.  No hepatosplenomegaly was noted.  EXTREMITIES: Without any cyanosis, clubbing, rash, lesions or edema.  NEUROLOGIC: sedated   SKIN: No ulceration or induration present.      LABS:                        7.7    7.1   )-----------( 143      ( 01 Dec 2017 06:50 )             23.7           145  |  110<H>  |  56<H>  ----------------------------<  141<H>  5.4<H>   |  19<L>  |  4.82<H>    Ca    7.6<L>      2017 16:57  Mg     3.3         TPro  6.7  /  Alb  3.2<L>  /  TBili  0.4  /  DBili  x   /  AST  23  /  ALT  24  /  AlkPhos  122<H>        PT/INR - ( 2017 05:40 )   PT: 11.8 sec;   INR: 1.08 ratio         PTT - ( 2017 05:40 )  PTT:36.1 sec  Urinalysis Basic - ( 2017 06:27 )    Color: Yellow / Appearance: Slightly Turbid / S.020 / pH: x  Gluc: x / Ketone: Negative  / Bili: Negative / Urobili: Negative mg/dL   Blood: x / Protein: 500 mg/dL / Nitrite: Negative   Leuk Esterase: Trace / RBC: 6-10 /HPF / WBC 0-2   Sq Epi: x / Non Sq Epi: Moderate / Bacteria: Few      ABG - ( 2017 12:29 )  pH:  7.37    /  pCO2: 26    /  pO2: 191   / HCO3: 17    / Base Excess: -9.8  /  SaO2: 100        Lactate, Blood (17 @ 06:50)    Lactate, Blood: 1.3 mmol/L    Lactate, Blood (17 @ 07:07)    Lactate, Blood: 3.4:    Serum Pro-Brain Natriuretic Peptide (17 @ 05:40)    Serum Pro-Brain Natriuretic Peptide: 26220 pg/mL      RECENT CULTURES:    Culture - Sputum (collected 2017 16:46)  Source: .Sputum Sputum  Gram Stain (2017 20:54):    Moderate polymorphonuclear leukocytes per low power field    No Squamous epithelial cells per low power field    No organisms seen per oil power field      RADIOLOGY & ADDITIONAL STUDIES:  US Renal (17 @ 12:12) >    Transverse and longitudinal images obtained.    Right kidney measures 9.1 cm vertical height, trace right perinephric   fluid present. No hydronephrosis mass or calculus evident.    Left kidney measures 10.1 cm vertical height, trace left perinephric   fluid. Lower pole cyst present, maximum span 9 mm. No obstructive changes   or calculi present.    Coker catheter visible within the urinary bladder.    IMPRESSION: No evidence of hydronephrosis. See above report.    CT Chest No Cont (17 @ 08:25) >  No prior chest CT studies present for comparison    Axial images obtained, coronal and sagittal images computer reformatted.   Noncontrast exam limits evaluation. Patient motion artifact present.    Small bilateral pleural effusions.   Bilateral airspace disease present. Airspace disease present in the right   upper lobe right middle lobe and right lower lobe. Airspace disease   visible in the left lower lobe. No mediastinal lesions evident. Hilar   regions obscured by bilateral lung pathology.    Cardiomegaly and coronary artery calcifications present. No evidence of   pericardial effusion. No evidence of thoracic aortic aneurysm. The   central airway is intact. An endotracheal tube is present. No adrenal   lesions. The spleen is not enlarged. No acute osseous abnormalities    CT Head No Cont (17 @ 08:24) >    Axial images obtained, coronal and sagittal images computer reformatted.   Patient motion present on some of the images.    Atrophic changes present. Periventricular white matter hypoattenuation,   microvascular ischemic change. 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 Old   left frontal infarct present. Bilateral lacuna infarcts in the basal   ganglion, corona radiata and thalami. No evidence of hemorrhage, mass   effect, midline shift, epidural, or subdural collection.    No unusual calcifications present. Calcifications visible the tentorium.   Calvarium intact. No fluid levels in visualized paranasal sinuses.    IMPRESSION: No evidence of hemorrhage or mass effect.       US Transthoracic Echocardiogram w/Doppler Complete (17 @ 08:14) >    CONCLUSIONS:  1. Normal left ventricular size and function. LVEF estimated at 65-70%.  2. Normal right ventricular size and function.  3. Minimal prolapse of the anterior mitral leaflet. Mild mitral stenosis.    Minimal mitral regurgitation.  4. Mild aortic stenosis.  5. Mild tricuspid regurgitation.     < end of copied text >      Assessment :    62 year old male hx of ? COPD, ETOH abuse, PVD, s/p right BKA , ? CKD vs EDWIN admitted with acuter cardiorespiratory arrest , primarily respiratory failure leading to cardiac arrest , has unequal pupils etiology is unclear, as Ct head is negative ,  seen by neurology  .    CXR shows extensive right sided infiltrates , has elevated lactate and severe metabolic acidosis    Ct chest reveals bilateral effusions with bilateral lower lobe consolidations ? aspiration vs fluid overload     Plan :   - continue with IV Zosyn for now, check Nasal swab for MRSA  - consider dc coker catheter if ok with nephrology ? trial of lasix drip   - full sepsis work up   - cardiac enzymes   - serial lactate and trend CBC  - obtain records from [facility] , discussed with Dr. CLARY Casas   - serial CXR     I have discussed the above plan of care with patient's  family in detail. They expressed understanding of the the treatment plan . Risks, benefits and alternatives discussed in detail. I have asked if they have any questions or concerns and appropriately addressed them to the best of my ability .      Critical care time greater then 35 minutes reviewing notes, labs data/ imaging , discussion with multidisciplinary team.    Thank you for allowing me to participate in care of your patient .        Gildardo Daley MD  [phone]

## 2017-12-01 NOTE — PROGRESS NOTE ADULT - SUBJECTIVE AND OBJECTIVE BOX
Neurology Follow up note    JP SANDOVAL 62y Male    HPI:  61 y/o male with PMH of PVD s/p left BKA, ETOH, ? COPD, ? ckd was sent from [facility] for evaluation of low Pulse ox.  Pt had cardiopulmonary arrest in ED due to acute respiratory failure. .Pt is intubated, +coker cath. ? aspiration - empirically on antibiotics. elevated lactate4.8 on admission which is trending down.  High BNP. CXR -right sided. patchy opacification. ECHO- normal as per cardiology. unequal pupils- right- 4mmand left 2mm. CAT head- no acute event. elevated BNP. (2017 10:32)    Interval History -    Patient is seen, chart was reviewed and case was discussed with the treatment team.  Pt is not in any distress.   Lying on bed comfortably.     Vital Signs Last 24 Hrs  T(C): 36.8 (01 Dec 2017 08:06), Max: 37.4 (2017 16:05)  T(F): 98.2 (01 Dec 2017 08:06), Max: 99.3 (2017 16:05)  HR: 79 (01 Dec 2017 09:00) (51 - 79)  BP: 157/77 (01 Dec 2017 09:00) (101/49 - 159/86)  BP(mean): 92 (01 Dec 2017 09:00) (66 - 99)  RR: 20 (01 Dec 2017 09:00) (5 - 26)  SpO2: 100% (01 Dec 2017 09:00) (98% - 100%)    REVIEW OF SYSTEMS:    CONSTITUTIONAL: No fever  EYES: No eye pain,   ENMT:  Extubated  NECK: Extubated  RESPIRATORY: Extubated  CARDIOVASCULAR: No acute chest pain, palpitations,  or leg swelling  GASTROINTESTINAL: No abdominal pain. No nausea, vomiting, or hematemesis;  No melena or hematochezia.  GENITOURINARY: No  hematuria, or incontinence  MUSCULOSKELETAL: H/o Left BKA.  SKIN: No itching, rashes, or lesions   LYMPH NODES: No enlarged glands  NEUROLOGICAL: No headaches, memory loss,   PSYCHIATRIC: No depression, anxiety, mood swings, or difficulty sleeping  ENDOCRINE: No heat or cold intolerance;   HEME/LYMPH: No easy bruising, or bleeding gums  Allergy/Immunology. No medication allergy. No seasonal allergies.    PHYSICAL EXAM:  Vital Signs Last 24 Hrs  T(F): 99 (11-30-17 @ 12:02)  HR: 54 (17 @ 12:52)  BP: 139/81 (17 @ 12:00)  RR: 20 (17 @ 12:00)    GENERAL: NAD, well-groomed, well-developed  HEAD:  Atraumatic, Normocephalic  EYES: EOMI, PERRLA, conjunctiva and sclera clear  NECK: Supple, No JVD, thyroid non-palpable    On Neurological Examination:    Extubated  Awake. Follows commands.    Pupil 2 mm,  reacting to light - No pupillary asymmetry is seen.  No facial asymmetry.  Moves b/l UE equally.  Left BKA.  Moves B/L LE well.  Neck is supple.  No seizure or tremors/Jerks seen.    MEDICATIONS    ALBUTerol    0.083% 2.5 milliGRAM(s) Nebulizer every 8 hours  chlorhexidine 0.12% Liquid 15 milliLiter(s) Swish and Spit two times a day  dextrose 5%. 1000 milliLiter(s) IV Continuous <Continuous>  dextrose 50% Injectable 12.5 Gram(s) IV Push once  dextrose 50% Injectable 25 Gram(s) IV Push once  dextrose 50% Injectable 25 Gram(s) IV Push once  dextrose Gel 1 Dose(s) Oral once PRN  glucagon  Injectable 1 milliGRAM(s) IntraMuscular once PRN  heparin  Injectable 5000 Unit(s) SubCutaneous every 8 hours  influenza   Vaccine 0.5 milliLiter(s) IntraMuscular once  insulin lispro (HumaLOG) corrective regimen sliding scale   SubCutaneous every 6 hours  pantoprazole  Injectable 40 milliGRAM(s) IV Push daily  piperacillin/tazobactam IVPB. 3.375 Gram(s) IV Intermittent every 12 hours  sodium chloride 0.45% 1000 milliLiter(s) IV Continuous <Continuous>    Allergies    No Known Allergies    LABS:    CBC Full  -  ( 01 Dec 2017 06:50 )  WBC Count : 7.1 K/uL  Hemoglobin : 7.7 g/dL  Hematocrit : 23.7 %  Platelet Count - Automated : 143 K/uL  Mean Cell Volume : 92.7 fl  Mean Cell Hemoglobin : 30.0 pg  Mean Cell Hemoglobin Concentration : 32.4 gm/dL  Urinalysis Basic - ( 2017 06:27 )    Color: Yellow / Appearance: Slightly Turbid / S.020 / pH: x  Gluc: x / Ketone: Negative  / Bili: Negative / Urobili: Negative mg/dL   Blood: x / Protein: 500 mg/dL / Nitrite: Negative   Leuk Esterase: Trace / RBC: 6-10 /HPF / WBC 0-2   Sq Epi: x / Non Sq Epi: Moderate / Bacteria: Few        144  |  109<H>  |  59<H>  ----------------------------<  110<H>  5.1   |  22  |  4.95<H>    Ca    7.5<L>      01 Dec 2017 06:50  Phos  4.8       Mg     2.9         TPro  6.7  /  Alb  3.2<L>  /  TBili  0.4  /  DBili  x   /  AST  23  /  ALT  24  /  AlkPhos  122<H>        RADIOLOGY      < from: CT Head No Cont (17 @ 08:24) >  EXAM:  CT BRAIN                          PROCEDURE DATE:  2017      INTERPRETATION:  Medical information: Cardiac arrest, unequal pupils.    Comparison study dated 2012    Axial images obtained, coronal and sagittal images computer reformatted.   Patient motion present on some of the images.    Atrophic changes present. Periventricular white matter hypoattenuation,   microvascular ischemic change. 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 Old   left frontal infarct present. Bilateral lacuna infarcts in the basal   ganglion, corona radiata and thalami. No evidence of hemorrhage, mass   effect, midline shift, epidural, or subdural collection.    No unusual calcifications present. Calcifications visible the tentorium.   Calvarium intact. No fluid levels in visualized paranasal sinuses.    IMPRESSION: No evidence of hemorrhage or mass effect.    SARAN BREWSTER M.D.,ATTENDING RADIOLOGIST  This document has been electronically signed. 2017  8:37AM      < end of copied text >    < from: CT Chest No Cont (17 @ 08:25) >  EXAM:  CT CHEST                          PROCEDURE DATE:  2017      INTERPRETATION:  Clinical information: Pneumonia, respiratory failure.    No prior chest CT studies present for comparison    Axial images obtained, coronal and sagittal images computer reformatted.   Noncontrast exam limits evaluation. Patient motion artifact present.    Small bilateral pleural effusions.   Bilateral airspace disease present. Airspace disease present in the right   upper lobe right middle lobe and right lower lobe. Airspace disease   visible in the left lower lobe. No mediastinal lesions evident. Hilar   regions obscured by bilateral lung pathology.    Cardiomegaly and coronary artery calcifications present. No evidence of   pericardial effusion. No evidence of thoracic aortic aneurysm. The   central airway is intact. An endotracheal tube is present. No adrenal   lesions. The spleen is not enlarged. No acute osseous abnormalities    IMPRESSION:    See above report.    SARAN BREWSTER M.D.,ATTENDING RADIOLOGIST  This document has been electronically signed. 2017  9:43AM           < end of copied text >

## 2017-12-01 NOTE — PROGRESS NOTE ADULT - PROBLEM SELECTOR PLAN 1
S/p cardiac arrest secondary to hypoxic respiratory failure; hemodynamically stable and without arrhythmias on telemetry with normal LV function; continue to optimize pulmonary status, vent weaning.

## 2017-12-01 NOTE — PROGRESS NOTE ADULT - SUBJECTIVE AND OBJECTIVE BOX
Patient is a 62y old  Male who presents with a chief complaint of c/o SOB, respiratory distress, s/p cardiopulmonary arrest (2017 10:32)      INTERVAL HPI/OVERNIGHT EVENTS:  Pt is seen and examined.    Pain Location & Control:     MEDICATIONS  (STANDING):  ALBUTerol    0.083% 2.5 milliGRAM(s) Nebulizer every 8 hours  chlorhexidine 0.12% Liquid 15 milliLiter(s) Swish and Spit two times a day  dextrose 5%. 1000 milliLiter(s) (50 mL/Hr) IV Continuous <Continuous>  dextrose 50% Injectable 12.5 Gram(s) IV Push once  dextrose 50% Injectable 25 Gram(s) IV Push once  dextrose 50% Injectable 25 Gram(s) IV Push once  heparin  Injectable 5000 Unit(s) SubCutaneous every 8 hours  influenza   Vaccine 0.5 milliLiter(s) IntraMuscular once  insulin lispro (HumaLOG) corrective regimen sliding scale   SubCutaneous every 6 hours  pantoprazole  Injectable 40 milliGRAM(s) IV Push daily  piperacillin/tazobactam IVPB. 3.375 Gram(s) IV Intermittent every 12 hours  sodium chloride 0.45% 1000 milliLiter(s) (75 mL/Hr) IV Continuous <Continuous>    MEDICATIONS  (PRN):  dextrose Gel 1 Dose(s) Oral once PRN Blood Glucose LESS THAN 70 milliGRAM(s)/deciliter  glucagon  Injectable 1 milliGRAM(s) IntraMuscular once PRN Glucose LESS THAN 70 milligrams/deciliter      Allergies    No Known Allergies    Intolerances            Vital Signs Last 24 Hrs  T(C): 36.8 (01 Dec 2017 08:06), Max: 37.4 (2017 16:05)  T(F): 98.2 (01 Dec 2017 08:06), Max: 99.3 (2017 16:05)  HR: 79 (01 Dec 2017 09:00) (51 - 79)  BP: 157/77 (01 Dec 2017 09:00) (101/49 - 159/86)  BP(mean): 92 (01 Dec 2017 09:00) (66 - 99)  RR: 20 (01 Dec 2017 09:00) (5 - 26)  SpO2: 100% (01 Dec 2017 09:00) (98% - 100%)        LABS:                        7.7    7.1   )-----------( 143      ( 01 Dec 2017 06:50 )             23.7     01 Dec 2017 06:50    144    |  109    |  59     ----------------------------<  110    5.1     |  22     |  4.95     Ca    7.5        01 Dec 2017 06:50  Phos  4.8       01 Dec 2017 06:50  Mg     2.9       01 Dec 2017 06:50      PT/INR - ( 2017 05:40 )   PT: 11.8 sec;   INR: 1.08 ratio         PTT - ( 2017 05:40 )  PTT:36.1 sec  Urinalysis Basic - ( 2017 06:27 )    Color: Yellow / Appearance: Slightly Turbid / S.020 / pH: x  Gluc: x / Ketone: Negative  / Bili: Negative / Urobili: Negative mg/dL   Blood: x / Protein: 500 mg/dL / Nitrite: Negative   Leuk Esterase: Trace / RBC: 6-10 /HPF / WBC 0-2   Sq Epi: x / Non Sq Epi: Moderate / Bacteria: Few      CAPILLARY BLOOD GLUCOSE      POCT Blood Glucose.: 161 mg/dL (01 Dec 2017 11:10)  POCT Blood Glucose.: 104 mg/dL (01 Dec 2017 05:25)  POCT Blood Glucose.: 177 mg/dL (2017 23:31)  POCT Blood Glucose.: 142 mg/dL (2017 17:20)  POCT Blood Glucose.: 138 mg/dL (2017 12:11)    CARDIAC MARKERS ( 2017 05:40 )  .005 ng/mL / x     / x     / x     / 4.3 ng/mL      Cultures  Culture Results:   No growth ( @ 12:09)    Lactate, Blood: 1.3 mmol/L ( @ 06:50)  Lactate, Blood: 1.3 mmol/L ( @ 12:06)      Culture - Sputum (collected 17 @ 16:46)  Source: .Sputum Sputum  Gram Stain (17 @ 20:54):    Moderate polymorphonuclear leukocytes per low power field    No Squamous epithelial cells per low power field    No organisms seen per oil power field    Culture - Urine (collected 17 @ 12:09)  Source: .Urine Catheterized  Final Report (17 @ 11:57):    No growth        RADIOLOGY & ADDITIONAL TESTS:    Imaging Personally Reviewed:  [ ] YES  [ ] NO    Consultant(s) Notes Reviewed:  [ ] YES  [ ] NO    Care Discussed with Consultants/Other Providers [ ] YES  [ ] NO Patient is a 62y old  Male who presents with a chief complaint of c/o SOB, respiratory distress, s/p cardiopulmonary arrest (2017 10:32)      INTERVAL HPI/OVERNIGHT EVENTS:  Pt is seen and examined.  pt is extubation. feels better.    Pain Location & Control:     MEDICATIONS  (STANDING):  ALBUTerol    0.083% 2.5 milliGRAM(s) Nebulizer every 8 hours  chlorhexidine 0.12% Liquid 15 milliLiter(s) Swish and Spit two times a day  dextrose 5%. 1000 milliLiter(s) (50 mL/Hr) IV Continuous <Continuous>  dextrose 50% Injectable 12.5 Gram(s) IV Push once  dextrose 50% Injectable 25 Gram(s) IV Push once  dextrose 50% Injectable 25 Gram(s) IV Push once  heparin  Injectable 5000 Unit(s) SubCutaneous every 8 hours  influenza   Vaccine 0.5 milliLiter(s) IntraMuscular once  insulin lispro (HumaLOG) corrective regimen sliding scale   SubCutaneous every 6 hours  pantoprazole  Injectable 40 milliGRAM(s) IV Push daily  piperacillin/tazobactam IVPB. 3.375 Gram(s) IV Intermittent every 12 hours  sodium chloride 0.45% 1000 milliLiter(s) (75 mL/Hr) IV Continuous <Continuous>    MEDICATIONS  (PRN):  dextrose Gel 1 Dose(s) Oral once PRN Blood Glucose LESS THAN 70 milliGRAM(s)/deciliter  glucagon  Injectable 1 milliGRAM(s) IntraMuscular once PRN Glucose LESS THAN 70 milligrams/deciliter      Allergies    No Known Allergies    Intolerances            Vital Signs Last 24 Hrs  T(C): 36.8 (01 Dec 2017 08:06), Max: 37.4 (2017 16:05)  T(F): 98.2 (01 Dec 2017 08:06), Max: 99.3 (2017 16:05)  HR: 79 (01 Dec 2017 09:00) (51 - 79)  BP: 157/77 (01 Dec 2017 09:00) (101/49 - 159/86)  BP(mean): 92 (01 Dec 2017 09:00) (66 - 99)  RR: 20 (01 Dec 2017 09:00) (5 - 26)  SpO2: 100% (01 Dec 2017 09:00) (98% - 100%)        LABS:                        7.7    7.1   )-----------( 143      ( 01 Dec 2017 06:50 )             23.7     01 Dec 2017 06:50    144    |  109    |  59     ----------------------------<  110    5.1     |  22     |  4.95     Ca    7.5        01 Dec 2017 06:50  Phos  4.8       01 Dec 2017 06:50  Mg     2.9       01 Dec 2017 06:50      PT/INR - ( 2017 05:40 )   PT: 11.8 sec;   INR: 1.08 ratio         PTT - ( 2017 05:40 )  PTT:36.1 sec  Urinalysis Basic - ( 2017 06:27 )    Color: Yellow / Appearance: Slightly Turbid / S.020 / pH: x  Gluc: x / Ketone: Negative  / Bili: Negative / Urobili: Negative mg/dL   Blood: x / Protein: 500 mg/dL / Nitrite: Negative   Leuk Esterase: Trace / RBC: 6-10 /HPF / WBC 0-2   Sq Epi: x / Non Sq Epi: Moderate / Bacteria: Few      CAPILLARY BLOOD GLUCOSE      POCT Blood Glucose.: 161 mg/dL (01 Dec 2017 11:10)  POCT Blood Glucose.: 104 mg/dL (01 Dec 2017 05:25)  POCT Blood Glucose.: 177 mg/dL (2017 23:31)  POCT Blood Glucose.: 142 mg/dL (2017 17:20)  POCT Blood Glucose.: 138 mg/dL (2017 12:11)    CARDIAC MARKERS ( 2017 05:40 )  .005 ng/mL / x     / x     / x     / 4.3 ng/mL      Cultures  Culture Results:   No growth ( @ 12:09)    Lactate, Blood: 1.3 mmol/L ( @ 06:50)  Lactate, Blood: 1.3 mmol/L ( @ 12:06)      Culture - Sputum (collected 17 @ 16:46)  Source: .Sputum Sputum  Gram Stain (17 @ 20:54):    Moderate polymorphonuclear leukocytes per low power field    No Squamous epithelial cells per low power field    No organisms seen per oil power field    Culture - Urine (collected 17 @ 12:09)  Source: .Urine Catheterized  Final Report (17 @ 11:57):    No growth        RADIOLOGY & ADDITIONAL TESTS:  < from: Xray Chest 1 View AP -PORTABLE-Routine (17 @ 06:46) >  EXAM:  CHEST PORTABLE ROUTINE         < end of copied text >  < from: Xray Chest 1 View AP -PORTABLE-Routine (17 @ 06:46) >  Cardiac monitor leads overlie the chest. An endotracheal tube is present   with its tip 5 cm above the wing. Improvement in the appearance of the   chest, decreasing airspace disease although still obvious especially in   the right lung. Airspace is diffuse in the right lung. Improved   appearance of left upper lung. Heart size appears mildly enlarged,   magnified secondary to portable technique. Aorta shows atherosclerotic   changes. No acute osseous abnormalities          Imaging Personally Reviewed:  [ ] YES  [ ] NO    Consultant(s) Notes Reviewed:  [x ] YES  [ ] NO    Care Discussed with Consultants/Other Providers [x] YES  [ ] NO

## 2017-12-01 NOTE — PHYSICAL THERAPY INITIAL EVALUATION ADULT - GENERAL OBSERVATIONS, REHAB EVAL
Pt rec'd supine in bed + 3L02NC donned t/o tx sats 94-96%. + IV + telemetry + coker. Pt agreeable to PT evaluation.

## 2017-12-01 NOTE — PROGRESS NOTE ADULT - PROBLEM SELECTOR PLAN 5
likely aspiration pneumonia.  c/w IV zosyn.  f/u lactate level.  f/u culture reports.  ID-  consult appreciated. likely aspiration pneumonia.  c/w IV zosyn.  f/u culture reports.

## 2017-12-01 NOTE — PROGRESS NOTE ADULT - SUBJECTIVE AND OBJECTIVE BOX
REASON FOR VISIT: S/p cardiac arrest    HPI: 62 year old man with a history of chronic illness, severe PVD s/p left BKA, anemia, peripheral neuropathy, uncontrolled DM, etOH abuse transferred from his long term care facility for the evaluation of hypoxia; now s/p asystolic arrest in ED due to hypoxic respiratory failure; also with renal failure.    12/1/17:  Sedated on vent; more responsive per RN.    MEDICATIONS  (STANDING):  ALBUTerol    90 MICROgram(s) HFA Inhaler 2 Puff(s) Inhalation every 6 hours  chlorhexidine 0.12% Liquid 15 milliLiter(s) Swish and Spit two times a day  heparin  Injectable 5000 Unit(s) SubCutaneous every 8 hours  influenza   Vaccine 0.5 milliLiter(s) IntraMuscular once  insulin lispro (HumaLOG) corrective regimen sliding scale   SubCutaneous every 6 hours  pantoprazole  Injectable 40 milliGRAM(s) IV Push daily  piperacillin/tazobactam IVPB. 3.375 Gram(s) IV Intermittent every 12 hours  propofol Infusion 5 MICROgram(s)/kG/Min (1.905 mL/Hr) IV Continuous <Continuous>  sodium chloride 0.45% 1000 milliLiter(s) (75 mL/Hr) IV Continuous <Continuous>    MEDICATIONS  (PRN):  dextrose Gel 1 Dose(s) Oral once PRN Blood Glucose LESS THAN 70 milliGRAM(s)/deciliter  fentaNYL    Injectable 25 MICROGram(s) IV Push every 2 hours PRN pain, sedation, resp distress, ventilated pt  glucagon  Injectable 1 milliGRAM(s) IntraMuscular once PRN Glucose LESS THAN 70 milligrams/deciliter    Vital Signs Last 24 Hrs  T(C): 36.8 (01 Dec 2017 08:06), Max: 37.4 (30 Nov 2017 16:05)  T(F): 98.2 (01 Dec 2017 08:06), Max: 99.3 (30 Nov 2017 16:05)  HR: 55 (01 Dec 2017 08:00) (46 - 68)  BP: 146/50 (01 Dec 2017 08:00) (101/49 - 159/86)  BP(mean): 79 (01 Dec 2017 08:00) (66 - 99)  RR: 22 (01 Dec 2017 08:00) (5 - 26)  SpO2: 100% (01 Dec 2017 08:00) (91% - 100%)    PHYSICAL EXAM:  Constitutional: Sedated on vent  Pulmonary: Mechanically ventilated via ETT; no crackles or wheeze, decreased breath sounds (R>L)  Cardiovascular: S1 and S2, mild bradycardia, I/VI systolic murmur  Gastrointestinal: Abdomen is soft.   Lymph: No pedal edema. S/p BKA  Skin: No rash.    LABS:       CARDIAC MARKERS ( 30 Nov 2017 05:40 ) .005 ng/mL / x     / x     / x     / 4.3 ng/mL                      7.7    7.1   )-----------( 143      ( 01 Dec 2017 06:50 )             23.7     144  |  109<H>  |  59<H>  ----------------------------<  110<H>  5.1   |  22  |  4.95<H>    Ca    7.5<L>      01 Dec 2017 06:50  Phos  4.8     12-01  Mg     2.9     12-01    TPro  6.7  /  Alb  3.2<L>  /  TBili  0.4  /  DBili  x   /  AST  23  /  ALT  24  /  AlkPhos  122<H>  11-30    CARDIAC MARKERS ( 30 Nov 2017 05:40 ) .005 ng/mL / x     / x     / x     / 4.3 ng/mL    11-30 @ 05:40 Pro Bnp [id number]    ECG (11/30 @ 6:03): Sinus bradycardia 59 bpm, low QRS voltage in limb leads, possible inferior infarct (old), mildly prolonged QTc    CT Chest No Cont (11.30.17 @ 08:25): Small bilateral pleural effusions.  Bilateral airspace disease present. Airspace disease present in the right upper lobe right middle lobe and right lower lobe. Airspace disease visible in the left lower lobe. No mediastinal lesions evident. Hilar regions obscured by bilateral lung pathology. Cardiomegaly and coronary artery calcifications present. No evidence of pericardial effusion. No evidence of thoracic aortic aneurysm. The central airway is intact. An endotracheal tube is present. No adrenal  lesions. The spleen is not enlarged. No acute osseous abnormalities.    US Transthoracic Echocardiogram w/Doppler Complete (11.30.17 @ 08:14) >  1. Normal left ventricular size and function. LVEF estimated at 65-70%.  2. Normal right ventricular size and function.  3. Minimal prolapse of the anterior mitral leaflet. Mild mitral stenosis.  Minimal mitral regurgitation.  4. Mild aortic stenosis.  5. Mild tricuspid regurgitation.     Tele: Sinus rhythm

## 2017-12-01 NOTE — PROGRESS NOTE ADULT - SUBJECTIVE AND OBJECTIVE BOX
Patient is a 62y old  Male who presents with a chief complaint of c/o SOB, respiratory distress, s/p cardiopulmonary arrest (2017 10:32)      Patient seen in follow up for EDWIN, CKD 4. Pt resting in bed. Confused. Pulling on IV lines as per nursing staff.     PAST MEDICAL HISTORY:  Peripheral Arterial Disease  Controlled Type 2 Diabetes with Leg or Foot Ulcer  ETOH Abuse    MEDICATIONS  (STANDING):  ALBUTerol    0.083% 2.5 milliGRAM(s) Nebulizer every 8 hours  chlorhexidine 0.12% Liquid 15 milliLiter(s) Swish and Spit two times a day  dextrose 5%. 1000 milliLiter(s) (50 mL/Hr) IV Continuous <Continuous>  dextrose 50% Injectable 12.5 Gram(s) IV Push once  dextrose 50% Injectable 25 Gram(s) IV Push once  dextrose 50% Injectable 25 Gram(s) IV Push once  heparin  Injectable 5000 Unit(s) SubCutaneous every 8 hours  influenza   Vaccine 0.5 milliLiter(s) IntraMuscular once  insulin lispro (HumaLOG) corrective regimen sliding scale   SubCutaneous every 6 hours  pantoprazole  Injectable 40 milliGRAM(s) IV Push daily  piperacillin/tazobactam IVPB. 3.375 Gram(s) IV Intermittent every 12 hours  sodium chloride 0.45% 1000 milliLiter(s) (75 mL/Hr) IV Continuous <Continuous>    MEDICATIONS  (PRN):  dextrose Gel 1 Dose(s) Oral once PRN Blood Glucose LESS THAN 70 milliGRAM(s)/deciliter  glucagon  Injectable 1 milliGRAM(s) IntraMuscular once PRN Glucose LESS THAN 70 milligrams/deciliter    T(C): 36.5 (17 @ 12:04), Max: 37.4 (17 @ 16:05)  HR: 79 (17 @ 09:00) (40 - 124)  BP: 157/77 (17 @ 09:00) (101/49 - 196/62)  RR: 20 (17 @ 09:00) (5 - 34)  SpO2: 100% (17 @ 09:00) (85% - 100%)  Wt(kg): --  I&O's Detail    2017 07:01  -  01 Dec 2017 07:00  --------------------------------------------------------  IN:    IV PiggyBack: 200 mL    propofol Infusion: 190 mL    sodium chloride 0.45%: 1350 mL    sodium chloride 0.9%: 450 mL  Total IN: 2190 mL    OUT:    Indwelling Catheter - Urethral: 450 mL  Total OUT: 450 mL    Total NET: 1740 mL      01 Dec 2017 07:01  -  01 Dec 2017 12:10  --------------------------------------------------------  IN:    propofol Infusion: 30.4 mL    sodium chloride 0.45%: 300 mL  Total IN: 330.4 mL    OUT:  Total OUT: 0 mL    Total NET: 330.4 mL          PHYSICAL EXAM:  General: NAD  Respiratory: b/l air entry  Cardiovascular: S1 S2  Gastrointestinal: soft  Extremities: left BKA.                           7.7    7.1   )-----------( 143      ( 01 Dec 2017 06:50 )             23.7     12    144  |  109<H>  |  59<H>  ----------------------------<  110<H>  5.1   |  22  |  4.95<H>    Ca    7.5<L>      01 Dec 2017 06:50  Phos  4.8       Mg     2.9         TPro  6.7  /  Alb  3.2<L>  /  TBili  0.4  /  DBili  x   /  AST  23  /  ALT  24  /  AlkPhos  122<H>  11-30    CARDIAC MARKERS ( 2017 05:40 )  .005 ng/mL / x     / x     / x     / 4.3 ng/mL      LIVER FUNCTIONS - ( 2017 05:40 )  Alb: 3.2 g/dL / Pro: 6.7 g/dL / ALK PHOS: 122 U/L / ALT: 24 U/L DA / AST: 23 U/L / GGT: x           Urinalysis Basic - ( 2017 06:27 )    Color: Yellow / Appearance: Slightly Turbid / S.020 / pH: x  Gluc: x / Ketone: Negative  / Bili: Negative / Urobili: Negative mg/dL   Blood: x / Protein: 500 mg/dL / Nitrite: Negative   Leuk Esterase: Trace / RBC: 6-10 /HPF / WBC 0-2   Sq Epi: x / Non Sq Epi: Moderate / Bacteria: Few      ABG - ( 2017 12:29 )  pH: x     /  pCO2: 26    /  pO2: 191   / HCO3: 17    / Base Excess: -9.8  /  SaO2: 100               Sodium, Serum: 144 ( @ 06:50)  Sodium, Serum: 145 ( @ 16:57)  Sodium, Serum: 140 ( @ 12:06)  Sodium, Serum: 140 ( @ 05:40)    Creatinine, Serum: 4.95 ( @ 06:50)  Creatinine, Serum: 4.82 ( @ 16:57)  Creatinine, Serum: 4.69 ( @ 12:06)  Creatinine, Serum: 5.10 ( @ 05:40)    Potassium, Serum: 5.1 ( @ 06:50)  Potassium, Serum: 5.4 ( @ 16:57)  Potassium, Serum: 5.5 ( @ 12:06)  Potassium, Serum: 5.1 ( @ 05:40)    Hemoglobin: 7.7 ( @ 06:50)  Hemoglobin: 10.3 ( @ 05:40)    < from: US Renal (17 @ 12:12) >  EXAM:  US KIDNEY(S)                                  PROCEDURE DATE:  2017          INTERPRETATION:   Clinical information: Cardiac arrest, acute kidney   injury    Transverse and longitudinal images obtained.    Right kidney measures 9.1 cm vertical height, trace right perinephric   fluid present. No hydronephrosis mass or calculus evident.        Left kidney measures 10.1 cm vertical height, trace left perinephric   fluid. Lower pole cyst present, maximum span 9 mm. No obstructive changes   or calculi present.        Bauer catheter visible within the urinary bladder.        IMPRESSION: No evidence of hydronephrosis. See above report.    < end of copied text >

## 2017-12-01 NOTE — PHYSICAL THERAPY INITIAL EVALUATION ADULT - PERTINENT HX OF CURRENT PROBLEM, REHAB EVAL
63 y/o male with PMH of PVD s/p left BKA, ETOH, ? COPD, ? ckd was sent from [facility] for evaluation of low Pulse ox.

## 2017-12-01 NOTE — PHYSICAL THERAPY INITIAL EVALUATION ADULT - ADDITIONAL COMMENTS
Pt resides at [facility] LT facility. Pt states he can ambulate with RW but uses w/c for mobility as well.

## 2017-12-01 NOTE — PROGRESS NOTE ADULT - SUBJECTIVE AND OBJECTIVE BOX
Date/Time Patient Seen:  		  Referring MD:   Data Reviewed	       Patient is a 62y old  Male who presents with a chief complaint of c/o SOB, respiratory distress, s/p cardiopulmonary arrest (30 Nov 2017 10:32)  in bed  seen and examined  vs and meds reviewed        Subjective/HPI     PAST MEDICAL & SURGICAL HISTORY:  Peripheral Arterial Disease  Controlled Type 2 Diabetes with Leg or Foot Ulcer  ETOH Abuse  Amputation, Below Knee, Unilateral, Traumatic  Amputation, Below Elbow, Unilateral, Traumatic        Medication list         MEDICATIONS  (STANDING):  ALBUTerol    90 MICROgram(s) HFA Inhaler 2 Puff(s) Inhalation every 6 hours  chlorhexidine 0.12% Liquid 15 milliLiter(s) Swish and Spit two times a day  dextrose 5%. 1000 milliLiter(s) (50 mL/Hr) IV Continuous <Continuous>  dextrose 50% Injectable 12.5 Gram(s) IV Push once  dextrose 50% Injectable 25 Gram(s) IV Push once  dextrose 50% Injectable 25 Gram(s) IV Push once  heparin  Injectable 5000 Unit(s) SubCutaneous every 8 hours  influenza   Vaccine 0.5 milliLiter(s) IntraMuscular once  insulin lispro (HumaLOG) corrective regimen sliding scale   SubCutaneous every 6 hours  pantoprazole  Injectable 40 milliGRAM(s) IV Push daily  piperacillin/tazobactam IVPB. 3.375 Gram(s) IV Intermittent every 12 hours  propofol Infusion 5 MICROgram(s)/kG/Min (1.905 mL/Hr) IV Continuous <Continuous>  sodium chloride 0.45% 1000 milliLiter(s) (75 mL/Hr) IV Continuous <Continuous>    MEDICATIONS  (PRN):  dextrose Gel 1 Dose(s) Oral once PRN Blood Glucose LESS THAN 70 milliGRAM(s)/deciliter  fentaNYL    Injectable 25 MICROGram(s) IV Push every 2 hours PRN pain, sedation, resp distress, ventilated pt  glucagon  Injectable 1 milliGRAM(s) IntraMuscular once PRN Glucose LESS THAN 70 milligrams/deciliter         Vitals log        ICU Vital Signs Last 24 Hrs  T(C): 36.8 (01 Dec 2017 04:07), Max: 37.4 (30 Nov 2017 16:05)  T(F): 98.2 (01 Dec 2017 04:07), Max: 99.3 (30 Nov 2017 16:05)  HR: 60 (01 Dec 2017 06:00) (46 - 68)  BP: 135/55 (01 Dec 2017 06:00) (101/49 - 159/86)  BP(mean): 78 (01 Dec 2017 06:00) (66 - 99)  ABP: --  ABP(mean): --  RR: 22 (01 Dec 2017 06:00) (5 - 31)  SpO2: 100% (01 Dec 2017 06:00) (90% - 100%)       Mode: AC/ CMV (Assist Control/ Continuous Mandatory Ventilation)  RR (machine): 22  TV (machine): 450  FiO2: 60  PEEP: 7  ITime: 1  MAP: 10  PIP: 28      Input and Output:  I&O's Detail    30 Nov 2017 07:01  -  01 Dec 2017 07:00  --------------------------------------------------------  IN:    IV PiggyBack: 200 mL    propofol Infusion: 174.8 mL    sodium chloride 0.45%: 1275 mL    sodium chloride 0.9%: 450 mL  Total IN: 2099.8 mL    OUT:    Indwelling Catheter - Urethral: 450 mL  Total OUT: 450 mL    Total NET: 1649.8 mL          Lab Data                        7.7    7.1   )-----------( 143      ( 01 Dec 2017 06:50 )             23.7     11-30    145  |  110<H>  |  56<H>  ----------------------------<  141<H>  5.4<H>   |  19<L>  |  4.82<H>    Ca    7.6<L>      30 Nov 2017 16:57  Mg     3.3     11-30    TPro  6.7  /  Alb  3.2<L>  /  TBili  0.4  /  DBili  x   /  AST  23  /  ALT  24  /  AlkPhos  122<H>  11-30    ABG - ( 30 Nov 2017 12:29 )  pH: x     /  pCO2: 26    /  pO2: 191   / HCO3: 17    / Base Excess: -9.8  /  SaO2: 100               CARDIAC MARKERS ( 30 Nov 2017 05:40 )  .005 ng/mL / x     / x     / x     / 4.3 ng/mL        Review of Systems	      Objective     Physical Examination    head at  heart s1s2  lungs dec BS  r BKA  et tube in      Pertinent Lab findings & Imaging      Lizette:  NO   Adequate UO     I&O's Detail    30 Nov 2017 07:01  -  01 Dec 2017 07:00  --------------------------------------------------------  IN:    IV PiggyBack: 200 mL    propofol Infusion: 174.8 mL    sodium chloride 0.45%: 1275 mL    sodium chloride 0.9%: 450 mL  Total IN: 2099.8 mL    OUT:    Indwelling Catheter - Urethral: 450 mL  Total OUT: 450 mL    Total NET: 1649.8 mL               Discussed with:     Cultures:	        Radiology

## 2017-12-01 NOTE — PROGRESS NOTE ADULT - ASSESSMENT
63 y/o male with PMH of PVD s/p left TKA, ETOH, ? COPD, ? ckd was sent from [facility] for evaluation of low Pulse ox.  Pt had cardiopulmonary arrest in ED due to acute respiratory failure. .Pt is intubated, +coker cath. ? aspiration - empirically on antibiotics. elevated lactate-4.8 on admission which is trending down.  High BNP. CXR -right sided. patchy opacification. ECHO- normal as per cardiology. unequal pupils- right- 4mm and left 2mm. CAT head- no acute event. elevated Bun/Cr- 59/5.10. for abdominal sono. 61 y/o male with PMH of PVD s/p left BKA, ETOH, ? COPD, ? ckd was sent from [facility] for evaluation of low Pulse ox.  Pt had cardiopulmonary arrest in ED due to acute respiratory failure. .Pt is intubated, +coker cath. ? aspiration - empirically on antibiotics. elevated lactate-4.8 on admission which is trending down.  High BNP. CXR -right sided. patchy opacification. ECHO- normal as per cardiology. unequal pupils- right- 4mm and left 2mm. CAT head- no acute event. elevated Bun/Cr- 59/5.10. for abdominal sono.  s/p extubation. feels better. coker with clear urin.

## 2017-12-02 DIAGNOSIS — J69.0 PNEUMONITIS DUE TO INHALATION OF FOOD AND VOMIT: ICD-10-CM

## 2017-12-02 DIAGNOSIS — J44.9 CHRONIC OBSTRUCTIVE PULMONARY DISEASE, UNSPECIFIED: ICD-10-CM

## 2017-12-02 DIAGNOSIS — N17.9 ACUTE KIDNEY FAILURE, UNSPECIFIED: ICD-10-CM

## 2017-12-02 DIAGNOSIS — D64.9 ANEMIA, UNSPECIFIED: ICD-10-CM

## 2017-12-02 LAB
ALBUMIN SERPL ELPH-MCNC: 2.5 G/DL — LOW (ref 3.3–5)
ALP SERPL-CCNC: 76 U/L — SIGNIFICANT CHANGE UP (ref 30–120)
ALT FLD-CCNC: 20 U/L DA — SIGNIFICANT CHANGE UP (ref 10–60)
ANION GAP SERPL CALC-SCNC: 12 MMOL/L — SIGNIFICANT CHANGE UP (ref 5–17)
AST SERPL-CCNC: 27 U/L — SIGNIFICANT CHANGE UP (ref 10–40)
BASE EXCESS BLDA CALC-SCNC: -1.4 MMOL/L — SIGNIFICANT CHANGE UP (ref -2–2)
BILIRUB SERPL-MCNC: 0.8 MG/DL — SIGNIFICANT CHANGE UP (ref 0.2–1.2)
BLOOD GAS SOURCE: SIGNIFICANT CHANGE UP
BUN SERPL-MCNC: 60 MG/DL — HIGH (ref 7–23)
CALCIUM SERPL-MCNC: 8 MG/DL — LOW (ref 8.4–10.5)
CHLORIDE SERPL-SCNC: 111 MMOL/L — HIGH (ref 96–108)
CO2 SERPL-SCNC: 24 MMOL/L — SIGNIFICANT CHANGE UP (ref 22–31)
CREAT SERPL-MCNC: 4.98 MG/DL — HIGH (ref 0.5–1.3)
CULTURE RESULTS: SIGNIFICANT CHANGE UP
GLUCOSE BLDC GLUCOMTR-MCNC: 141 MG/DL — HIGH (ref 70–99)
GLUCOSE BLDC GLUCOMTR-MCNC: 164 MG/DL — HIGH (ref 70–99)
GLUCOSE BLDC GLUCOMTR-MCNC: 173 MG/DL — HIGH (ref 70–99)
GLUCOSE BLDC GLUCOMTR-MCNC: 95 MG/DL — SIGNIFICANT CHANGE UP (ref 70–99)
GLUCOSE SERPL-MCNC: 86 MG/DL — SIGNIFICANT CHANGE UP (ref 70–99)
HCO3 BLDA-SCNC: 23 MMOL/L — SIGNIFICANT CHANGE UP (ref 21–29)
HCT VFR BLD CALC: 23.5 % — LOW (ref 39–50)
HGB BLD-MCNC: 7.6 G/DL — LOW (ref 13–17)
HOROWITZ INDEX BLDA+IHG-RTO: 21 — SIGNIFICANT CHANGE UP
MCHC RBC-ENTMCNC: 30.5 PG — SIGNIFICANT CHANGE UP (ref 27–34)
MCHC RBC-ENTMCNC: 32.2 GM/DL — SIGNIFICANT CHANGE UP (ref 32–36)
MCV RBC AUTO: 94.9 FL — SIGNIFICANT CHANGE UP (ref 80–100)
NT-PROBNP SERPL-SCNC: HIGH PG/ML (ref 0–125)
PCO2 BLDA: 37 MMHG — SIGNIFICANT CHANGE UP (ref 32–46)
PH BLD: 7.41 — SIGNIFICANT CHANGE UP (ref 7.35–7.45)
PLATELET # BLD AUTO: 119 K/UL — LOW (ref 150–400)
PO2 BLDA: 57 MMHG — LOW (ref 74–108)
POTASSIUM SERPL-MCNC: 5 MMOL/L — SIGNIFICANT CHANGE UP (ref 3.5–5.3)
POTASSIUM SERPL-SCNC: 5 MMOL/L — SIGNIFICANT CHANGE UP (ref 3.5–5.3)
PROT SERPL-MCNC: 5.9 G/DL — LOW (ref 6–8.3)
RBC # BLD: 2.48 M/UL — LOW (ref 4.2–5.8)
RBC # FLD: 14.5 % — SIGNIFICANT CHANGE UP (ref 10.3–14.5)
SAO2 % BLDA: 90 % — LOW (ref 92–96)
SODIUM SERPL-SCNC: 147 MMOL/L — HIGH (ref 135–145)
SPECIMEN SOURCE: SIGNIFICANT CHANGE UP
WBC # BLD: 5.7 K/UL — SIGNIFICANT CHANGE UP (ref 3.8–10.5)
WBC # FLD AUTO: 5.7 K/UL — SIGNIFICANT CHANGE UP (ref 3.8–10.5)

## 2017-12-02 PROCEDURE — 71010: CPT | Mod: 26

## 2017-12-02 PROCEDURE — 99233 SBSQ HOSP IP/OBS HIGH 50: CPT

## 2017-12-02 RX ORDER — FUROSEMIDE 40 MG
40 TABLET ORAL ONCE
Qty: 0 | Refills: 0 | Status: COMPLETED | OUTPATIENT
Start: 2017-12-02 | End: 2017-12-02

## 2017-12-02 RX ADMIN — Medication 1: at 12:04

## 2017-12-02 RX ADMIN — PIPERACILLIN AND TAZOBACTAM 25 GRAM(S): 4; .5 INJECTION, POWDER, LYOPHILIZED, FOR SOLUTION INTRAVENOUS at 17:29

## 2017-12-02 RX ADMIN — Medication 25 MILLIGRAM(S): at 22:01

## 2017-12-02 RX ADMIN — AMLODIPINE BESYLATE 5 MILLIGRAM(S): 2.5 TABLET ORAL at 05:58

## 2017-12-02 RX ADMIN — ALBUTEROL 2.5 MILLIGRAM(S): 90 AEROSOL, METERED ORAL at 15:35

## 2017-12-02 RX ADMIN — Medication 1: at 16:55

## 2017-12-02 RX ADMIN — ALBUTEROL 2.5 MILLIGRAM(S): 90 AEROSOL, METERED ORAL at 00:01

## 2017-12-02 RX ADMIN — HEPARIN SODIUM 5000 UNIT(S): 5000 INJECTION INTRAVENOUS; SUBCUTANEOUS at 14:10

## 2017-12-02 RX ADMIN — Medication 25 MILLIGRAM(S): at 14:10

## 2017-12-02 RX ADMIN — Medication 650 MILLIGRAM(S): at 05:57

## 2017-12-02 RX ADMIN — CHLORHEXIDINE GLUCONATE 15 MILLILITER(S): 213 SOLUTION TOPICAL at 05:57

## 2017-12-02 RX ADMIN — ALBUTEROL 2.5 MILLIGRAM(S): 90 AEROSOL, METERED ORAL at 20:37

## 2017-12-02 RX ADMIN — CHLORHEXIDINE GLUCONATE 15 MILLILITER(S): 213 SOLUTION TOPICAL at 17:30

## 2017-12-02 RX ADMIN — HEPARIN SODIUM 5000 UNIT(S): 5000 INJECTION INTRAVENOUS; SUBCUTANEOUS at 22:01

## 2017-12-02 RX ADMIN — PIPERACILLIN AND TAZOBACTAM 25 GRAM(S): 4; .5 INJECTION, POWDER, LYOPHILIZED, FOR SOLUTION INTRAVENOUS at 05:57

## 2017-12-02 RX ADMIN — ALBUTEROL 2.5 MILLIGRAM(S): 90 AEROSOL, METERED ORAL at 07:55

## 2017-12-02 RX ADMIN — Medication 40 MILLIGRAM(S): at 09:31

## 2017-12-02 RX ADMIN — HEPARIN SODIUM 5000 UNIT(S): 5000 INJECTION INTRAVENOUS; SUBCUTANEOUS at 05:57

## 2017-12-02 RX ADMIN — PANTOPRAZOLE SODIUM 40 MILLIGRAM(S): 20 TABLET, DELAYED RELEASE ORAL at 12:03

## 2017-12-02 RX ADMIN — Medication 25 MILLIGRAM(S): at 05:57

## 2017-12-02 NOTE — PROGRESS NOTE ADULT - ASSESSMENT
62 year old male s/p cardiopulmonary arrest extubated with stable pulmonary function at present. Occassionally patient noted to desaturate to mid 80s, but has not required rescue therapy this evening.

## 2017-12-02 NOTE — PROGRESS NOTE ADULT - SUBJECTIVE AND OBJECTIVE BOX
REASON FOR VISIT: S/p cardiac arrest    HPI: 62 year old man with a history of chronic illness, severe PVD s/p left BKA, anemia, peripheral neuropathy, uncontrolled DM, etOH abuse transferred from his long term care facility for the evaluation of hypoxia; now s/p asystolic arrest in ED due to hypoxic respiratory failure; also with renal failure.    17:  Sedated on vent; more responsive per RN.  17: extubated and breathing better but still SOB and sating in high 80's to low 90's.    MEDICATIONS  (STANDING):  ALBUTerol    0.083% 2.5 milliGRAM(s) Nebulizer every 8 hours  amLODIPine   Tablet 5 milliGRAM(s) Oral daily  chlorhexidine 0.12% Liquid 15 milliLiter(s) Swish and Spit two times a day  dextrose 5%. 1000 milliLiter(s) (50 mL/Hr) IV Continuous <Continuous>  dextrose 50% Injectable 12.5 Gram(s) IV Push once  dextrose 50% Injectable 25 Gram(s) IV Push once  dextrose 50% Injectable 25 Gram(s) IV Push once  heparin  Injectable 5000 Unit(s) SubCutaneous every 8 hours  influenza   Vaccine 0.5 milliLiter(s) IntraMuscular once  insulin lispro (HumaLOG) corrective regimen sliding scale   SubCutaneous Before meals and at bedtime  metoprolol     tartrate 25 milliGRAM(s) Oral every 8 hours  pantoprazole  Injectable 40 milliGRAM(s) IV Push daily  piperacillin/tazobactam IVPB. 3.375 Gram(s) IV Intermittent every 12 hours  sodium bicarbonate 650 milliGRAM(s) Oral two times a day    MEDICATIONS  (PRN):  dextrose Gel 1 Dose(s) Oral once PRN Blood Glucose LESS THAN 70 milliGRAM(s)/deciliter  glucagon  Injectable 1 milliGRAM(s) IntraMuscular once PRN Glucose LESS THAN 70 milligrams/deciliter      Vital Signs Last 24 Hrs  T(C): 37 (02 Dec 2017 08:44), Max: 37 (02 Dec 2017 08:44)  T(F): 98.6 (02 Dec 2017 08:44), Max: 98.6 (02 Dec 2017 08:44)  HR: 61 (02 Dec 2017 10:00) (57 - 79)  BP: 140/61 (02 Dec 2017 10:00) (116/63 - 153/64)  BP(mean): 84 (02 Dec 2017 10:00) (74 - 100)  RR: 17 (02 Dec 2017 10:00) (13 - 23)  SpO2: 91% (02 Dec 2017 10:00) (91% - 96%)    I&O's Detail    01 Dec 2017 07:01  -  02 Dec 2017 07:00  --------------------------------------------------------  IN:    IV PiggyBack: 200 mL    Oral Fluid: 680 mL    propofol Infusion: 30.4 mL    sodium chloride 0.45%: 900 mL    sodium chloride 0.45%: 375 mL  Total IN: 2185.4 mL    OUT:    Voided: 100 mL  Total OUT: 100 mL    Total NET: 2085.4 mL      02 Dec 2017 07:01  -  02 Dec 2017 11:50  --------------------------------------------------------  IN:    Oral Fluid: 120 mL    sodium chloride 0.45%: 50 mL  Total IN: 170 mL    OUT:  Total OUT: 0 mL    Total NET: 170 mL          Daily     Daily Weight in k.6 (02 Dec 2017 04:02)  PHYSICAL EXAM:  Constitutional: awake and in no distress but mildly tachypneic  Pulmonary: decreased breath sounds (R>L), ronchi on right mid lung field.  Cardiovascular: S1 and S2, mild bradycardia, 2/6 ROSARIO to 2/VI systolic murmur  Gastrointestinal: Abdomen is soft.   Lymph: No pedal edema. S/p BKA  Skin: No rash.    LABS:                               7.6    5.7   )-----------( 119      ( 02 Dec 2017 06:58 )             23.5     12-02    147<H>  |  111<H>  |  60<H>  ----------------------------<  86  5.0   |  24  |  4.98<H>    Ca    8.0<L>      02 Dec 2017 06:58  Phos  4.8     12-01  Mg     2.9         TPro  5.9<L>  /  Alb  2.5<L>  /  TBili  0.8  /  DBili  x   /  AST  27  /  ALT  20  /  AlkPhos  76          LIVER FUNCTIONS - ( 02 Dec 2017 06:58 )  Alb: 2.5 g/dL / Pro: 5.9 g/dL / ALK PHOS: 76 U/L / ALT: 20 U/L DA / AST: 27 U/L / GGT: x              @ 05:40 Pro Bnp [id number]    ECG ( @ 6:03): Sinus bradycardia 59 bpm, low QRS voltage in limb leads, possible inferior infarct (old), mildly prolonged QTc    CT Chest No Cont (17 @ 08:25): Small bilateral pleural effusions.  Bilateral airspace disease present. Airspace disease present in the right upper lobe right middle lobe and right lower lobe. Airspace disease visible in the left lower lobe. No mediastinal lesions evident. Hilar regions obscured by bilateral lung pathology. Cardiomegaly and coronary artery calcifications present. No evidence of pericardial effusion. No evidence of thoracic aortic aneurysm. The central airway is intact. An endotracheal tube is present. No adrenal  lesions. The spleen is not enlarged. No acute osseous abnormalities.    US Transthoracic Echocardiogram w/Doppler Complete (17 @ 08:14) >  1. Normal left ventricular size and function. LVEF estimated at 65-70%.  2. Normal right ventricular size and function.  3. Minimal prolapse of the anterior mitral leaflet. Mild mitral stenosis.  Minimal mitral regurgitation.  4. Mild aortic stenosis.  5. Mild tricuspid regurgitation.     Tele: Sinus rhythm

## 2017-12-02 NOTE — PROGRESS NOTE ADULT - SUBJECTIVE AND OBJECTIVE BOX
JP SANDOVAL is a 62yMale , patient examined and chart reviewed. Patient being followed for acute respiratory failure and cardiac arrest likely secondary to fluid overload and ?? pneumonia     INTERVAL HPI/ OVERNIGHT EVENTS: Events noted, afebrile was extubated, developed acute respiratory distress placed on BiPAP. CXR shows increasing right effusion       Past Medical History--  PAST MEDICAL & SURGICAL HISTORY:  Peripheral Arterial Disease  Controlled Type 2 Diabetes with Leg or Foot Ulcer  ETOH Abuse  Amputation, Below Knee, Unilateral, Traumatic      For details regarding the patient's social history, family history, and other miscellaneous elements, please refer the initial infectious diseases consultation and/or the admitting history and physical examination for this admission.        ROS:  CONSTITUTIONAL:  Negative fever or chills, feels well, good appetite  EYES:  Negative  blurry vision or double vision  CARDIOVASCULAR:  Negative for chest pain or palpitations  RESPIRATORY:  positive  for cough, and SOB   GASTROINTESTINAL:  Negative for nausea, vomiting, diarrhea, constipation, or abdominal pain  GENITOURINARY:  Negative frequency, urgency , dysuria or hematuria   NEUROLOGIC:  No headache, confusion, dizziness, lightheadedness  All other systems were reviewed and are negative     Allergies:      Current inpatient medications :    ANTIBIOTICS/RELEVANT:  piperacillin/tazobactam IVPB. 3.375 Gram(s) IV Intermittent every 12 hours      ALBUTerol    0.083% 2.5 milliGRAM(s) Nebulizer every 8 hours  amLODIPine   Tablet 5 milliGRAM(s) Oral daily  chlorhexidine 0.12% Liquid 15 milliLiter(s) Swish and Spit two times a day  dextrose 5%. 1000 milliLiter(s) IV Continuous <Continuous>  dextrose 50% Injectable 12.5 Gram(s) IV Push once  dextrose 50% Injectable 25 Gram(s) IV Push once  dextrose 50% Injectable 25 Gram(s) IV Push once  dextrose Gel 1 Dose(s) Oral once PRN  glucagon  Injectable 1 milliGRAM(s) IntraMuscular once PRN  heparin  Injectable 5000 Unit(s) SubCutaneous every 8 hours  insulin lispro (HumaLOG) corrective regimen sliding scale   SubCutaneous Before meals and at bedtime  metoprolol     tartrate 25 milliGRAM(s) Oral every 8 hours  pantoprazole  Injectable 40 milliGRAM(s) IV Push daily  sodium bicarbonate 650 milliGRAM(s) Oral two times a day  sodium chloride 0.45%. 1000 milliLiter(s) IV Continuous <Continuous>      Objective:    12-01 @ 07:01  -  12-02 @ 07:00  --------------------------------------------------------  IN: 2185.4 mL / OUT: 100 mL / NET: 2085.4 mL    12-02 @ 07:01  -  12-02 @ 08:18  --------------------------------------------------------  IN: 50 mL / OUT: 0 mL / NET: 50 mL      T(C): 36.6 (12-02-17 @ 04:02), Max: 36.8 (12-02-17 @ 00:02)  HR: 63 (12-02-17 @ 08:00) (57 - 85)  BP: 143/80 (12-02-17 @ 08:00) (116/63 - 157/77)  RR: 14 (12-02-17 @ 08:00) (13 - 23)  SpO2: 95% (12-02-17 @ 08:00) (91% - 100%)  Wt(kg): --      Physical Exam:  GEN: NAD, pleasant  HEENT: normocephalic and atraumatic. EOMI. [place]. Moist mucosa. Clear Posterior pharynx.  NECK: Supple. No carotid bruits.  No lymphadenopathy or thyromegaly.  LUNGS: decreased bs on  auscultation.  HEART: Regular rate and rhythm without murmur.  ABDOMEN: Soft, nontender, and nondistended.  Positive bowel sounds.  No hepatosplenomegaly was noted.  EXTREMITIES: Without any cyanosis, clubbing, rash, lesions or edema.  NEUROLOGIC: A & O x3, No focal neurological deficits   SKIN: No ulceration or induration present.      LABS:                        7.6    5.7   )-----------( 119      ( 02 Dec 2017 06:58 )             23.5       12-02    147<H>  |  111<H>  |  60<H>  ----------------------------<  86  5.0   |  24  |  4.98<H>    Ca    8.0<L>      02 Dec 2017 06:58  Phos  4.8     12-01  Mg     2.9     12-01    TPro  5.9<L>  /  Alb  2.5<L>  /  TBili  0.8  /  DBili  x   /  AST  27  /  ALT  20  /  AlkPhos  76  12-02          ABG - ( 02 Dec 2017 01:13 )  pH: 7.41    /  pCO2: 37    /  pO2: 57    / HCO3: 23    / Base Excess: -1.4  /  SaO2: 90             RECENT CULTURES:    Culture - Sputum (collected 30 Nov 2017 16:46)  Source: .Sputum Sputum  Gram Stain (30 Nov 2017 20:54):    Moderate polymorphonuclear leukocytes per low power field    No Squamous epithelial cells per low power field    No organisms seen per oil power field  Preliminary Report (01 Dec 2017 17:11):    No growth to date.    Culture - Urine (collected 30 Nov 2017 12:09)  Source: .Urine Catheterized  Final Report (01 Dec 2017 11:57):    No growth    Culture - Blood (collected 30 Nov 2017 12:06)  Source: .Blood Blood  Preliminary Report (01 Dec 2017 13:01):    No growth to date.    Culture - Blood (collected 30 Nov 2017 12:06)  Source: .Blood Blood  Preliminary Report (01 Dec 2017 13:01):    No growth to date.            RADIOLOGY & ADDITIONAL STUDIES:   Xray Chest 1 View AP- PORTABLE-Urgent (12.02.17 @ 01:29) >      INTERPRETATION:  Unchanged right lower lung opacity. Small bilateral   pleural effusions     Xray Chest 1 View AP -PORTABLE-Routine (12.01.17 @ 06:46) >    Comparison study dated 11/30/2017.    Cardiac monitor leads overlie the chest. An endotracheal tube is present   with its tip 5 cm above the wing. Improvement in the appearance of the   chest, decreasing airspace disease although still obvious especially in   the right lung. Airspace is diffuse in the right lung. Improved   appearance of left upper lung. Heart size appears mildly enlarged,   magnified secondary to portable technique. Aorta shows atherosclerotic   changes. No acute osseous abnormalities    Assessment :  62 year old male hx of ? COPD, ETOH abuse, PVD, s/p right BKA , ? CKD vs EDWIN admitted with acuter cardiorespiratory arrest , primarily respiratory failure leading to cardiac arrest , has unequal pupils etiology is unclear, as Ct head is negative ,  seen by neurology  .    CXR shows extensive right sided infiltrates , has elevated lactate and severe metabolic acidosis    Ct chest reveals bilateral effusions with bilateral lower lobe consolidations ? aspiration vs fluid overload     Plan :   - continue with IV Zosyn for now  - consider diuretics and dc IVF   - So far  sepsis work up is all negative   - cardiac enzymes   - obtain records from [facility] , discussed with Dr. CLARY Casas   - serial CXR   - may need HD if no improvement in CXR      I have discussed the above plan of care with patient's  family in detail. They expressed understanding of the the treatment plan . Risks, benefits and alternatives discussed in detail. I have asked if they have any questions or concerns and appropriately addressed them to the best of my ability .      Critical care time greater then 35 minutes reviewing notes, labs data/ imaging , discussion with multidisciplinary team.    Thank you for allowing me to participate in care of your patient .        Gildardo Daley MD  [phone]

## 2017-12-02 NOTE — PROGRESS NOTE ADULT - PROBLEM SELECTOR PLAN 1
S/p cardiac arrest secondary to hypoxic respiratory failure; hemodynamically stable and without arrhythmias on telemetry with normal LV function; continue to optimize pulmonary status with treatment of PNA.

## 2017-12-02 NOTE — PROGRESS NOTE ADULT - PROBLEM SELECTOR PLAN 5
s/p brief arrest and successful resus.   cvs regimen  TTE reviewed  cardio follow up  ICU supportive care  serial labs  serial clinical exam

## 2017-12-02 NOTE — PROGRESS NOTE ADULT - SUBJECTIVE AND OBJECTIVE BOX
Date/Time Patient Seen:  		  Referring MD:   Data Reviewed	       Patient is a 62y old  Male who presents with a chief complaint of c/o SOB, respiratory distress, s/p cardiopulmonary arrest (30 Nov 2017 10:32)    in bed  seen and examined  on BIPAP this am for resp distress  sp LASIX        Subjective/HPI     PAST MEDICAL & SURGICAL HISTORY:  Peripheral Arterial Disease  Controlled Type 2 Diabetes with Leg or Foot Ulcer  ETOH Abuse  Amputation, Below Knee, Unilateral, Traumatic  Amputation, Below Elbow, Unilateral, Traumatic        Medication list         MEDICATIONS  (STANDING):  ALBUTerol    0.083% 2.5 milliGRAM(s) Nebulizer every 8 hours  amLODIPine   Tablet 5 milliGRAM(s) Oral daily  chlorhexidine 0.12% Liquid 15 milliLiter(s) Swish and Spit two times a day  dextrose 5%. 1000 milliLiter(s) (50 mL/Hr) IV Continuous <Continuous>  dextrose 50% Injectable 12.5 Gram(s) IV Push once  dextrose 50% Injectable 25 Gram(s) IV Push once  dextrose 50% Injectable 25 Gram(s) IV Push once  furosemide   Injectable 40 milliGRAM(s) IV Push once  heparin  Injectable 5000 Unit(s) SubCutaneous every 8 hours  influenza   Vaccine 0.5 milliLiter(s) IntraMuscular once  insulin lispro (HumaLOG) corrective regimen sliding scale   SubCutaneous Before meals and at bedtime  metoprolol     tartrate 25 milliGRAM(s) Oral every 8 hours  pantoprazole  Injectable 40 milliGRAM(s) IV Push daily  piperacillin/tazobactam IVPB. 3.375 Gram(s) IV Intermittent every 12 hours  sodium bicarbonate 650 milliGRAM(s) Oral two times a day    MEDICATIONS  (PRN):  dextrose Gel 1 Dose(s) Oral once PRN Blood Glucose LESS THAN 70 milliGRAM(s)/deciliter  glucagon  Injectable 1 milliGRAM(s) IntraMuscular once PRN Glucose LESS THAN 70 milligrams/deciliter         Vitals log        ICU Vital Signs Last 24 Hrs  T(C): 36.6 (02 Dec 2017 04:02), Max: 36.8 (02 Dec 2017 00:02)  T(F): 97.8 (02 Dec 2017 04:02), Max: 98.2 (02 Dec 2017 00:02)  HR: 63 (02 Dec 2017 08:00) (57 - 85)  BP: 143/80 (02 Dec 2017 08:00) (116/63 - 157/77)  BP(mean): 100 (02 Dec 2017 08:00) (73 - 100)  ABP: --  ABP(mean): --  RR: 14 (02 Dec 2017 08:00) (13 - 23)  SpO2: 95% (02 Dec 2017 08:00) (91% - 100%)           Input and Output:  I&O's Detail    01 Dec 2017 07:01  -  02 Dec 2017 07:00  --------------------------------------------------------  IN:    IV PiggyBack: 200 mL    Oral Fluid: 680 mL    propofol Infusion: 30.4 mL    sodium chloride 0.45%: 900 mL    sodium chloride 0.45%: 375 mL  Total IN: 2185.4 mL    OUT:    Voided: 100 mL  Total OUT: 100 mL    Total NET: 2085.4 mL      02 Dec 2017 07:01  -  02 Dec 2017 08:27  --------------------------------------------------------  IN:    sodium chloride 0.45%: 50 mL  Total IN: 50 mL    OUT:  Total OUT: 0 mL    Total NET: 50 mL          Lab Data                        7.6    5.7   )-----------( 119      ( 02 Dec 2017 06:58 )             23.5     12-02    147<H>  |  111<H>  |  60<H>  ----------------------------<  86  5.0   |  24  |  4.98<H>    Ca    8.0<L>      02 Dec 2017 06:58  Phos  4.8     12-01  Mg     2.9     12-01    TPro  5.9<L>  /  Alb  2.5<L>  /  TBili  0.8  /  DBili  x   /  AST  27  /  ALT  20  /  AlkPhos  76  12-02    ABG - ( 02 Dec 2017 01:13 )  pH: x     /  pCO2: 37    /  pO2: 57    / HCO3: 23    / Base Excess: -1.4  /  SaO2: 90                      Review of Systems	      Objective     Physical Examination    head at  heart s1s2  lungs dec BS      Pertinent Lab findings & Imaging      Lizette:  NO   Adequate UO     I&O's Detail    01 Dec 2017 07:01  -  02 Dec 2017 07:00  --------------------------------------------------------  IN:    IV PiggyBack: 200 mL    Oral Fluid: 680 mL    propofol Infusion: 30.4 mL    sodium chloride 0.45%: 900 mL    sodium chloride 0.45%: 375 mL  Total IN: 2185.4 mL    OUT:    Voided: 100 mL  Total OUT: 100 mL    Total NET: 2085.4 mL      02 Dec 2017 07:01  -  02 Dec 2017 08:27  --------------------------------------------------------  IN:    sodium chloride 0.45%: 50 mL  Total IN: 50 mL    OUT:  Total OUT: 0 mL    Total NET: 50 mL               Discussed with:     Cultures:	        Radiology

## 2017-12-02 NOTE — CHART NOTE - NSCHARTNOTEFT_GEN_A_CORE
Assessment:  Pt s/p cardiac arrest 2/2 hypoxic respiratory failure. s/p extubation. MD advanced diet to mechanical soft Con CHO low sodium. Pt confused but tolerating meals as per RN. No s/sx aspiration reported.  pt c renal failure: dialysis being considered    Factors impacting intake: [ ] none [ ] nausea  [ ] vomiting [ ] diarrhea [ ] constipation  [x ]chewing problems [ ] swallowing issues  [ ] other:     Diet Presciption: Diet, Mechanical Soft:   Consistent Carbohydrate {Evening Snack}  Low Sodium (12-01-17 @ 12:21)    Intake:     Current Weight: Weight (kg): 63.5 (11-30 @ 08:36)  % Weight Change    Pertinent Medications: MEDICATIONS  (STANDING):  ALBUTerol    0.083% 2.5 milliGRAM(s) Nebulizer every 8 hours  amLODIPine   Tablet 5 milliGRAM(s) Oral daily  chlorhexidine 0.12% Liquid 15 milliLiter(s) Swish and Spit two times a day  dextrose 5%. 1000 milliLiter(s) (50 mL/Hr) IV Continuous <Continuous>  dextrose 50% Injectable 12.5 Gram(s) IV Push once  dextrose 50% Injectable 25 Gram(s) IV Push once  dextrose 50% Injectable 25 Gram(s) IV Push once  heparin  Injectable 5000 Unit(s) SubCutaneous every 8 hours  influenza   Vaccine 0.5 milliLiter(s) IntraMuscular once  insulin lispro (HumaLOG) corrective regimen sliding scale   SubCutaneous Before meals and at bedtime  metoprolol     tartrate 25 milliGRAM(s) Oral every 8 hours  pantoprazole  Injectable 40 milliGRAM(s) IV Push daily  piperacillin/tazobactam IVPB. 3.375 Gram(s) IV Intermittent every 12 hours  sodium bicarbonate 650 milliGRAM(s) Oral two times a day    MEDICATIONS  (PRN):  dextrose Gel 1 Dose(s) Oral once PRN Blood Glucose LESS THAN 70 milliGRAM(s)/deciliter  glucagon  Injectable 1 milliGRAM(s) IntraMuscular once PRN Glucose LESS THAN 70 milligrams/deciliter    Pertinent Labs: 12-02 Na147 mmol/L<H> Glu 86 mg/dL K+ 5.0 mmol/L Cr  4.98 mg/dL<H> BUN 60 mg/dL<H> Phos n/a   Alb 2.5 g/dL<L> PAB n/a        CAPILLARY BLOOD GLUCOSE      POCT Blood Glucose.: 164 mg/dL (02 Dec 2017 11:47)  POCT Blood Glucose.: 95 mg/dL (02 Dec 2017 07:56)  POCT Blood Glucose.: 117 mg/dL (01 Dec 2017 20:46)  POCT Blood Glucose.: 169 mg/dL (01 Dec 2017 15:55)    Skin:     Estimated Needs:   [ ] no change since previous assessment  [ ] recalculated:     Previous Nutrition Diagnosis:   [ ] Inadequate Energy Intake [ x]Inadequate Oral Intake [ ] Excessive Energy Intake   [ ] Underweight [ ] Increased Nutrient Needs [ ] Overweight/Obesity   [ ] Altered GI Function [ ] Unintended Weight Loss [ ] Food & Nutrition Related Knowledge Deficit [ ] Malnutrition     Nutrition Diagnosis is [ ] ongoing  [x ] resolved [ ] not applicable     New Nutrition Diagnosis: [ ] not applicable  Altered nutrition related lab values: BUN,Creat 2/2 renal failure.       Interventions:   Recommend  [ ] Change Diet To:  [ ] Nutrition Supplement  [ ] Nutrition Support  [ ] Other:     Monitoring and Evaluation:   [ ] PO intake [ x ] Tolerance to diet prescription [ x ] weights [ x ] labs[ x ] follow up per protocol  [ ] other: Assessment:  Pt s/p cardiac arrest 2/2 hypoxic respiratory failure. s/p extubation. MD advanced diet to mechanical soft Con CHO low sodium. Pt confused but tolerating meals as per RN. No s/sx aspiration reported.  pt c renal failure: dialysis being considered    Factors impacting intake: [ ] none [ ] nausea  [ ] vomiting [ ] diarrhea [ ] constipation  [x ]chewing problems [ ] swallowing issues  [ ] other:     Diet Presciption: Diet, Mechanical Soft:   Consistent Carbohydrate {Evening Snack}  Low Sodium (12-01-17 @ 12:21)    Intake:     Current Weight: Weight (kg): 63.5 (11-30 @ 08:36)  % Weight Change     Pertinent Medications: MEDICATIONS  (STANDING):  ALBUTerol    0.083% 2.5 milliGRAM(s) Nebulizer every 8 hours  amLODIPine   Tablet 5 milliGRAM(s) Oral daily  chlorhexidine 0.12% Liquid 15 milliLiter(s) Swish and Spit two times a day  dextrose 5%. 1000 milliLiter(s) (50 mL/Hr) IV Continuous <Continuous>  dextrose 50% Injectable 12.5 Gram(s) IV Push once  dextrose 50% Injectable 25 Gram(s) IV Push once  dextrose 50% Injectable 25 Gram(s) IV Push once  heparin  Injectable 5000 Unit(s) SubCutaneous every 8 hours  influenza   Vaccine 0.5 milliLiter(s) IntraMuscular once  insulin lispro (HumaLOG) corrective regimen sliding scale   SubCutaneous Before meals and at bedtime  metoprolol     tartrate 25 milliGRAM(s) Oral every 8 hours  pantoprazole  Injectable 40 milliGRAM(s) IV Push daily  piperacillin/tazobactam IVPB. 3.375 Gram(s) IV Intermittent every 12 hours  sodium bicarbonate 650 milliGRAM(s) Oral two times a day    MEDICATIONS  (PRN):  dextrose Gel 1 Dose(s) Oral once PRN Blood Glucose LESS THAN 70 milliGRAM(s)/deciliter  glucagon  Injectable 1 milliGRAM(s) IntraMuscular once PRN Glucose LESS THAN 70 milligrams/deciliter    Pertinent Labs: 12-02 Na147 mmol/L<H> Glu 86 mg/dL K+ 5.0 mmol/L Cr  4.98 mg/dL<H> BUN 60 mg/dL<H> Phos n/a   Alb 2.5 g/dL<L> PAB n/a        CAPILLARY BLOOD GLUCOSE      POCT Blood Glucose.: 164 mg/dL (02 Dec 2017 11:47)  POCT Blood Glucose.: 95 mg/dL (02 Dec 2017 07:56)  POCT Blood Glucose.: 117 mg/dL (01 Dec 2017 20:46)  POCT Blood Glucose.: 169 mg/dL (01 Dec 2017 15:55)    Skin:     Estimated Needs:   [ ] no change since previous assessment  [ ] recalculated:     Previous Nutrition Diagnosis:   [ ] Inadequate Energy Intake [ x]Inadequate Oral Intake [ ] Excessive Energy Intake   [ ] Underweight [ ] Increased Nutrient Needs [ ] Overweight/Obesity   [ ] Altered GI Function [ ] Unintended Weight Loss [ ] Food & Nutrition Related Knowledge Deficit [ ] Malnutrition     Nutrition Diagnosis is [ ] ongoing  [x ] resolved [ ] not applicable     New Nutrition Diagnosis: [ ] not applicable  Altered nutrition related lab values:  Kidney,cardiac, endocrine dysfunction As evidenced by abnormal BUN,Creat HgbA1C, edema and ascites      Interventions:   Recommend  [ ] Change Diet To:  [ ] Nutrition Supplement  [ ] Nutrition Support  [ ] Other:     Monitoring and Evaluation:   [ ] PO intake [ x ] Tolerance to diet prescription [ x ] weights [ x ] labs[ x ] follow up per protocol  [ ] other: Assessment:  Pt s/p cardiac arrest 2/2 hypoxic respiratory failure. s/p extubation. MD advanced diet to mechanical soft Con CHO low sodium. Pt confused but tolerating meals as per RN. No s/sx aspiration reported.  pt c renal failure: dialysis being considered    Factors impacting intake: [ ] none [ ] nausea  [ ] vomiting [ ] diarrhea [ ] constipation  [x ]chewing problems [ ] swallowing issues  [ ] other:     Diet Presciption: Diet, Mechanical Soft:   Consistent Carbohydrate {Evening Snack}  Low Sodium (12-01-17 @ 12:21)    Intake:     Current Weight: Weight (kg): 63.5 (11-30 @ 08:36)  % Weight Change  140# 12/2    Pertinent Medications: MEDICATIONS  (STANDING):  ALBUTerol    0.083% 2.5 milliGRAM(s) Nebulizer every 8 hours  amLODIPine   Tablet 5 milliGRAM(s) Oral daily  chlorhexidine 0.12% Liquid 15 milliLiter(s) Swish and Spit two times a day  dextrose 5%. 1000 milliLiter(s) (50 mL/Hr) IV Continuous <Continuous>  dextrose 50% Injectable 12.5 Gram(s) IV Push once  dextrose 50% Injectable 25 Gram(s) IV Push once  dextrose 50% Injectable 25 Gram(s) IV Push once  heparin  Injectable 5000 Unit(s) SubCutaneous every 8 hours  influenza   Vaccine 0.5 milliLiter(s) IntraMuscular once  insulin lispro (HumaLOG) corrective regimen sliding scale   SubCutaneous Before meals and at bedtime  metoprolol     tartrate 25 milliGRAM(s) Oral every 8 hours  pantoprazole  Injectable 40 milliGRAM(s) IV Push daily  piperacillin/tazobactam IVPB. 3.375 Gram(s) IV Intermittent every 12 hours  sodium bicarbonate 650 milliGRAM(s) Oral two times a day    MEDICATIONS  (PRN):  dextrose Gel 1 Dose(s) Oral once PRN Blood Glucose LESS THAN 70 milliGRAM(s)/deciliter  glucagon  Injectable 1 milliGRAM(s) IntraMuscular once PRN Glucose LESS THAN 70 milligrams/deciliter    Pertinent Labs: 12-02 Na147 mmol/L<H> Glu 86 mg/dL K+ 5.0 mmol/L Cr  4.98 mg/dL<H> BUN 60 mg/dL<H> Phos n/a   Alb 2.5 g/dL<L> PAB n/a        CAPILLARY BLOOD GLUCOSE      POCT Blood Glucose.: 164 mg/dL (02 Dec 2017 11:47)  POCT Blood Glucose.: 95 mg/dL (02 Dec 2017 07:56)  POCT Blood Glucose.: 117 mg/dL (01 Dec 2017 20:46)  POCT Blood Glucose.: 169 mg/dL (01 Dec 2017 15:55)    Skin:     Estimated Needs:   x] no change since previous assessment  [ ] recalculated:     Previous Nutrition Diagnosis:   [ ] Inadequate Energy Intake [ x]Inadequate Oral Intake [ ] Excessive Energy Intake   [ ] Underweight [ ] Increased Nutrient Needs [ ] Overweight/Obesity   [ ] Altered GI Function [ ] Unintended Weight Loss [ ] Food & Nutrition Related Knowledge Deficit [ ] Malnutrition     Nutrition Diagnosis is [ ] ongoing  [x ] resolved [ ] not applicable     New Nutrition Diagnosis: [ ] not applicable  Altered nutrition related lab values:  Kidney,cardiac, endocrine dysfunction As evidenced by abnormal BUN,Creat HgbA1C, edema and ascites      Interventions:   Recommend  [ ] Change Diet To:  [ ] Nutrition Supplement  [ ] Nutrition Support  [ ] Other: continue consistent carbohydrate/low sodium diet.     Monitoring and Evaluation:   [ ] PO intake [ x ] Tolerance to diet prescription [ x ] weights [ x ] labs[ x ] follow up per protocol  [ ] other: Assessment:  Pt s/p cardiac arrest 2/2 hypoxic respiratory failure. s/p extubation. MD advanced diet to mechanical soft Con CHO low sodium. Pt confused but tolerating meals as per RN. No s/sx aspiration reported.  pt c renal failure: dialysis being considered    Factors impacting intake: [ ] none [ ] nausea  [ ] vomiting [ ] diarrhea [ ] constipation  [x ]chewing problems [ ] swallowing issues  [ ] other:     Diet Presciption: Diet, Mechanical Soft:   Consistent Carbohydrate {Evening Snack}  Low Sodium (12-01-17 @ 12:21)    Intake: >50%    Current Weight: Weight (kg): 63.5 (11-30 @ 08:36)  % Weight Change  140# 12/2    Pertinent Medications: MEDICATIONS  (STANDING):  ALBUTerol    0.083% 2.5 milliGRAM(s) Nebulizer every 8 hours  amLODIPine   Tablet 5 milliGRAM(s) Oral daily  chlorhexidine 0.12% Liquid 15 milliLiter(s) Swish and Spit two times a day  dextrose 5%. 1000 milliLiter(s) (50 mL/Hr) IV Continuous <Continuous>  dextrose 50% Injectable 12.5 Gram(s) IV Push once  dextrose 50% Injectable 25 Gram(s) IV Push once  dextrose 50% Injectable 25 Gram(s) IV Push once  heparin  Injectable 5000 Unit(s) SubCutaneous every 8 hours  influenza   Vaccine 0.5 milliLiter(s) IntraMuscular once  insulin lispro (HumaLOG) corrective regimen sliding scale   SubCutaneous Before meals and at bedtime  metoprolol     tartrate 25 milliGRAM(s) Oral every 8 hours  pantoprazole  Injectable 40 milliGRAM(s) IV Push daily  piperacillin/tazobactam IVPB. 3.375 Gram(s) IV Intermittent every 12 hours  sodium bicarbonate 650 milliGRAM(s) Oral two times a day    MEDICATIONS  (PRN):  dextrose Gel 1 Dose(s) Oral once PRN Blood Glucose LESS THAN 70 milliGRAM(s)/deciliter  glucagon  Injectable 1 milliGRAM(s) IntraMuscular once PRN Glucose LESS THAN 70 milligrams/deciliter    Pertinent Labs: 12-02 Na147 mmol/L<H> Glu 86 mg/dL K+ 5.0 mmol/L Cr  4.98 mg/dL<H> BUN 60 mg/dL<H> Phos n/a   Alb 2.5 g/dL<L> PAB n/a        CAPILLARY BLOOD GLUCOSE      POCT Blood Glucose.: 164 mg/dL (02 Dec 2017 11:47)  POCT Blood Glucose.: 95 mg/dL (02 Dec 2017 07:56)  POCT Blood Glucose.: 117 mg/dL (01 Dec 2017 20:46)  POCT Blood Glucose.: 169 mg/dL (01 Dec 2017 15:55)    Skin: no change    Estimated Needs:   x] no change since previous assessment  [ ] recalculated:     Previous Nutrition Diagnosis:   [ ] Inadequate Energy Intake [ x]Inadequate Oral Intake [ ] Excessive Energy Intake   [ ] Underweight [ ] Increased Nutrient Needs [ ] Overweight/Obesity   [ ] Altered GI Function [ ] Unintended Weight Loss [ ] Food & Nutrition Related Knowledge Deficit [ ] Malnutrition     Nutrition Diagnosis is [ ] ongoing  [x ] resolved [ ] not applicable     New Nutrition Diagnosis: [ ] not applicable  Altered nutrition related lab values:  Kidney,cardiac, endocrine dysfunction As evidenced by abnormal BUN,Creat HgbA1C, edema and ascites      Interventions:   Recommend  [ ] Change Diet To:  [ ] Nutrition Supplement  [ ] Nutrition Support  [ ] Other: continue consistent carbohydrate/low sodium diet.     Monitoring and Evaluation:   [ ] PO intake [ x ] Tolerance to diet prescription [ x ] weights [ x ] labs[ x ] follow up per protocol  [ ] other:

## 2017-12-02 NOTE — PROGRESS NOTE ADULT - ASSESSMENT
Seen for AMS/s/op cardiac arrest.  Awake.  No focal neuro deficit.  Likely underlying Poor cognition   CT Head - No acute pathology.  Supportive care.

## 2017-12-02 NOTE — PROGRESS NOTE ADULT - PROBLEM SELECTOR PLAN 5
acute on chronic secondary to cardiac arrest   Hold nephrotoxic meds  Continue aggressive hydration  Trend urine output  Follow up BUN/Creatinine  follow up electrolytes

## 2017-12-02 NOTE — PROGRESS NOTE ADULT - ASSESSMENT
62 year old male s/p cardiopulmonary arrest extubated but now with acute hypoxic respiratory failure requiirng NIV in form of BIPAP.     Critical Care time: 40 mins assessing presenting problems of acute illness that poses high probability of life threatening deterioration or end organ damage/dysfunction.  Medical decision making inculding Initiating plan of care, reviewing data, reviewing radiology, discussing with multidisciplinary team, non inclusive of procedures, discussing goals of care with patient/family

## 2017-12-02 NOTE — PROGRESS NOTE ADULT - SUBJECTIVE AND OBJECTIVE BOX
Patient is a 62y old  Male who presents with a chief complaint of c/o SOB, respiratory distress, s/p cardiopulmonary arrest (30 Nov 2017 10:32)      INTERVAL HPI/OVERNIGHT EVENTS: no acute overnight events .     MEDICATIONS  (STANDING):  ALBUTerol    0.083% 2.5 milliGRAM(s) Nebulizer every 8 hours  amLODIPine   Tablet 5 milliGRAM(s) Oral daily  chlorhexidine 0.12% Liquid 15 milliLiter(s) Swish and Spit two times a day  dextrose 5%. 1000 milliLiter(s) (50 mL/Hr) IV Continuous <Continuous>  dextrose 50% Injectable 12.5 Gram(s) IV Push once  dextrose 50% Injectable 25 Gram(s) IV Push once  dextrose 50% Injectable 25 Gram(s) IV Push once  heparin  Injectable 5000 Unit(s) SubCutaneous every 8 hours  influenza   Vaccine 0.5 milliLiter(s) IntraMuscular once  insulin lispro (HumaLOG) corrective regimen sliding scale   SubCutaneous Before meals and at bedtime  metoprolol     tartrate 25 milliGRAM(s) Oral every 8 hours  pantoprazole  Injectable 40 milliGRAM(s) IV Push daily  piperacillin/tazobactam IVPB. 3.375 Gram(s) IV Intermittent every 12 hours  sodium bicarbonate 650 milliGRAM(s) Oral two times a day    MEDICATIONS  (PRN):  dextrose Gel 1 Dose(s) Oral once PRN Blood Glucose LESS THAN 70 milliGRAM(s)/deciliter  glucagon  Injectable 1 milliGRAM(s) IntraMuscular once PRN Glucose LESS THAN 70 milligrams/deciliter      Allergies    No Known Allergies    Intolerances        REVIEW OF SYSTEMS:  CONSTITUTIONAL: No fever or chills  HEENT:  No headache, no sore throat  RESPIRATORY: No cough, wheezing, or shortness of breath  CARDIOVASCULAR: No chest pain, palpitations, or leg swelling  GASTROINTESTINAL: No nausea, vomiting, or diarrhea  GENITOURINARY: No dysuria, frequency, or hematuria  NEUROLOGICAL: no focal weakness or dizziness  SKIN:  No rashes or lesions   MUSCULOSKELETAL: no myalgias   PSYCHIATRIC: No depression or anxiety  ROS Unable to obtain due to - [ ] Dementia  [ ] Lethargy  REST OF REVIEW Of SYSTEM - [ ] Normal     Vital Signs Last 24 Hrs  T(C): 36.4 (02 Dec 2017 12:04), Max: 37 (02 Dec 2017 08:44)  T(F): 97.6 (02 Dec 2017 12:04), Max: 98.6 (02 Dec 2017 08:44)  HR: 74 (02 Dec 2017 14:00) (57 - 74)  BP: 155/50 (02 Dec 2017 14:00) (116/63 - 155/50)  BP(mean): 81 (02 Dec 2017 14:00) (74 - 100)  RR: 21 (02 Dec 2017 14:00) (13 - 23)  SpO2: 91% (02 Dec 2017 14:00) (90% - 96%)    PHYSICAL EXAM:  GENERAL: NAD  HEENT:  EOMI, mmm  CHEST/LUNG:  CTA b/l, no rales, wheezes, or rhonchi  HEART:  RRR, S1, S2, []murmur  ABDOMEN:  BS+, soft, NT, ND  EXTREMITIES: no edema or calf tenderness  SKIN:  no rash  NERVOUS SYSTEM: AA&Ox3    DIET:     Cultures: Culture Results:   No growth to date. (11-30 @ 16:46)  Culture Results:   No growth (11-30 @ 12:09)  Culture Results:   No growth to date. (11-30 @ 12:06)  Culture Results:   No growth to date. (11-30 @ 12:06)      LABS:                        7.6    5.7   )-----------( 119      ( 02 Dec 2017 06:58 )             23.5     CBC Full  -  ( 02 Dec 2017 06:58 )  WBC Count : 5.7 K/uL  Hemoglobin : 7.6 g/dL  Hematocrit : 23.5 %  Platelet Count - Automated : 119 K/uL  Mean Cell Volume : 94.9 fl  Mean Cell Hemoglobin : 30.5 pg  Mean Cell Hemoglobin Concentration : 32.2 gm/dL  Auto Neutrophil # : x  Auto Lymphocyte # : x  Auto Monocyte # : x  Auto Eosinophil # : x  Auto Basophil # : x  Auto Neutrophil % : x  Auto Lymphocyte % : x  Auto Monocyte % : x  Auto Eosinophil % : x  Auto Basophil % : x    02 Dec 2017 06:58    147    |  111    |  60     ----------------------------<  86     5.0     |  24     |  4.98     Ca    8.0        02 Dec 2017 06:58    TPro  5.9    /  Alb  2.5    /  TBili  0.8    /  DBili  x      /  AST  27     /  ALT  20     /  AlkPhos  76     02 Dec 2017 06:58        CAPILLARY BLOOD GLUCOSE      POCT Blood Glucose.: 164 mg/dL (02 Dec 2017 11:47)  POCT Blood Glucose.: 95 mg/dL (02 Dec 2017 07:56)  POCT Blood Glucose.: 117 mg/dL (01 Dec 2017 20:46)  POCT Blood Glucose.: 169 mg/dL (01 Dec 2017 15:55)          RADIOLOGY & ADDITIONAL TESTS:    Personally reviewed.     Consultant(s) Notes Reviewed:  [x] YES  [ ] NO    Care Discussed with [ ] Consultants  [x] Patient  [ ] Family  [x]      [ ] Other; RN  DVT ppx  Advanced directive

## 2017-12-02 NOTE — PROGRESS NOTE ADULT - SUBJECTIVE AND OBJECTIVE BOX
Neurology Follow up note    JP SANDOVAL 62y Male    HPI:  61 y/o male with PMH of PVD s/p left BKA, ETOH, ? COPD, ? ckd was sent from [facility] for evaluation of low Pulse ox.  Pt had cardiopulmonary arrest in ED due to acute respiratory failure. .Pt is intubated, +coker cath. ? aspiration - empirically on antibiotics. elevated lactate4.8 on admission which is trending down.  High BNP. CXR -right sided. patchy opacification. ECHO- normal as per cardiology. unequal pupils- right- 4mmand left 2mm. CAT head- no acute event. elevated BNP. (30 Nov 2017 10:32)    Interval History -    Patient is seen, chart was reviewed and case was discussed with the treatment team.  Pt is not in any distress.   Lying on bed comfortably.     Vital Signs Last 24 Hrs  T(C): 37 (02 Dec 2017 08:44), Max: 37 (02 Dec 2017 08:44)  T(F): 98.6 (02 Dec 2017 08:44), Max: 98.6 (02 Dec 2017 08:44)  HR: 62 (02 Dec 2017 12:00) (57 - 63)  BP: 147/59 (02 Dec 2017 12:00) (116/63 - 153/64)  BP(mean): 82 (02 Dec 2017 12:00) (74 - 100)  RR: 14 (02 Dec 2017 12:00) (13 - 23)  SpO2: 90% (02 Dec 2017 12:00) (90% - 96%)      REVIEW OF SYSTEMS:    CONSTITUTIONAL: No fever  EYES: No eye pain,   ENMT:  Extubated  NECK: Extubated  RESPIRATORY: Extubated  CARDIOVASCULAR: No acute chest pain, palpitations,  or leg swelling  GASTROINTESTINAL: No abdominal pain. No nausea, vomiting, or hematemesis;  No melena or hematochezia.  GENITOURINARY: No  hematuria, or incontinence  MUSCULOSKELETAL: H/o Left BKA.  SKIN: No itching, rashes, or lesions   LYMPH NODES: No enlarged glands  NEUROLOGICAL: No headaches, memory loss,   PSYCHIATRIC: No depression, anxiety, mood swings, or difficulty sleeping  ENDOCRINE: No heat or cold intolerance;   HEME/LYMPH: No easy bruising, or bleeding gums  Allergy/Immunology. No medication allergy. No seasonal allergies.    GENERAL: NAD, well-groomed, well-developed  HEAD:  Atraumatic, Normocephalic  EYES: EOMI, PERRLA, conjunctiva and sclera clear  NECK: Supple, No JVD, thyroid non-palpable    On Neurological Examination:    Extubated  Awake. Follows commands.   Pupil 2 mm,  reacting to light - No pupillary asymmetry is seen.  No facial asymmetry.  Moves b/l UE equally.  Left BKA.  Moves B/L LE well.  Neck is supple.  No seizure or tremors/Jerks seen.    MEDICATIONS    ALBUTerol    0.083% 2.5 milliGRAM(s) Nebulizer every 8 hours  amLODIPine   Tablet 5 milliGRAM(s) Oral daily  chlorhexidine 0.12% Liquid 15 milliLiter(s) Swish and Spit two times a day  dextrose 5%. 1000 milliLiter(s) IV Continuous <Continuous>  dextrose 50% Injectable 12.5 Gram(s) IV Push once  dextrose 50% Injectable 25 Gram(s) IV Push once  dextrose 50% Injectable 25 Gram(s) IV Push once  dextrose Gel 1 Dose(s) Oral once PRN  glucagon  Injectable 1 milliGRAM(s) IntraMuscular once PRN  heparin  Injectable 5000 Unit(s) SubCutaneous every 8 hours  influenza   Vaccine 0.5 milliLiter(s) IntraMuscular once  insulin lispro (HumaLOG) corrective regimen sliding scale   SubCutaneous Before meals and at bedtime  metoprolol     tartrate 25 milliGRAM(s) Oral every 8 hours  pantoprazole  Injectable 40 milliGRAM(s) IV Push daily  piperacillin/tazobactam IVPB. 3.375 Gram(s) IV Intermittent every 12 hours  sodium bicarbonate 650 milliGRAM(s) Oral two times a day      Allergies    No Known Allergies    LABS:    CBC Full  -  ( 02 Dec 2017 06:58 )  WBC Count : 5.7 K/uL  Hemoglobin : 7.6 g/dL  Hematocrit : 23.5 %  Platelet Count - Automated : 119 K/uL  Mean Cell Volume : 94.9 fl  Mean Cell Hemoglobin : 30.5 pg  Mean Cell Hemoglobin Concentration : 32.2 gm/dL    12-02    147<H>  |  111<H>  |  60<H>  ----------------------------<  86  5.0   |  24  |  4.98<H>    Ca    8.0<L>      02 Dec 2017 06:58  Phos  4.8     12-01  Mg     2.9     12-01    TPro  5.9<L>  /  Alb  2.5<L>  /  TBili  0.8  /  DBili  x   /  AST  27  /  ALT  20  /  AlkPhos  76  12-02    RADIOLOGY    < from: CT Head No Cont (11.30.17 @ 08:24) >  EXAM:  CT BRAIN                            PROCEDURE DATE:  11/30/2017      INTERPRETATION:  Medical information: Cardiac arrest, unequal pupils.    Comparison study dated 5/4/2012    Axial images obtained, coronal and sagittal images computer reformatted.   Patient motion present on some of the images.    Atrophic changes present. Periventricular white matter hypoattenuation,   microvascular ischemic change. 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 Old   left frontal infarct present. Bilateral lacuna infarcts in the basal   ganglion, corona radiata and thalami. No evidence of hemorrhage, mass   effect, midline shift, epidural, or subdural collection.    No unusual calcifications present. Calcifications visible the tentorium.   Calvarium intact. No fluid levels in visualized paranasal sinuses.    IMPRESSION: No evidence of hemorrhage or mass effect.      SARAN BREWSTER M.D.,ATTENDING RADIOLOGIST  This document has been electronically signed. Nov 30 2017  8:37AM      < end of copied text >

## 2017-12-02 NOTE — PROGRESS NOTE ADULT - SUBJECTIVE AND OBJECTIVE BOX
CC: Patient is a 62y old  Male who presents with a chief complaint of c/o SOB, respiratory distress and went into cardiac arrest in the ER     HPI:  61 y/o male with PMH of PVD s/p left BKA, ETOH,  COPD,  was sent from [facility] for evaluation of low Pulse ox. He went into cardiopulmonary arrest in ED due to acute hypoxic  respiratory failure. .There is qustion of aspiration preceding the event as cause for initial hypoxia and placed empirically on antibiotics. although the BNP was elevated and acute CHF exacerbation cannot be ruled out as cause. The chest xray showed patchy opacification on the right, TTE showed minimal mitral prolapse but otherwise was normal, post arrest CT brain showed no acute events.     Patient with resolution of respiratory distress. Patient was extubated and now on nasal cannula. No signs of decompensation. Lying in bed comfortably upon visit.        PAST MEDICAL & SURGICAL HISTORY:  Peripheral Arterial Disease  Controlled Type 2 Diabetes with Leg or Foot Ulcer  ETOH Abuse  Amputation, Below Knee, Unilateral, Traumatic    FAMILY HISTORY:    Vital Signs Last 24 Hrs  T(C): 36.8 (02 Dec 2017 20:00), Max: 37 (02 Dec 2017 08:44)  T(F): 98.3 (02 Dec 2017 20:00), Max: 98.6 (02 Dec 2017 08:44)  HR: 63 (02 Dec 2017 22:34) (57 - 74)  BP: 148/58 (02 Dec 2017 22:34) (136/53 - 159/61)  BP(mean): 84 (02 Dec 2017 22:34) (76 - 100)  RR: 13 (02 Dec 2017 22:34) (13 - 21)  SpO2: 96% (02 Dec 2017 22:34) (86% - 96%)    I&O's Detail    01 Dec 2017 07:01  -  02 Dec 2017 07:00  --------------------------------------------------------  IN:    IV PiggyBack: 200 mL    Oral Fluid: 680 mL    propofol Infusion: 30.4 mL    sodium chloride 0.45%: 900 mL    sodium chloride 0.45%: 375 mL  Total IN: 2185.4 mL    OUT:    Voided: 100 mL  Total OUT: 100 mL    Total NET: 2085.4 mL      02 Dec 2017 07:01  -  02 Dec 2017 22:45  --------------------------------------------------------  IN:    Oral Fluid: 120 mL    sodium chloride 0.45%: 50 mL  Total IN: 170 mL    OUT:    Voided: 150 mL  Total OUT: 150 mL    Total NET: 20 mL      12-02    147<H>  |  111<H>  |  60<H>  ----------------------------<  86  5.0   |  24  |  4.98<H>    Ca    8.0<L>      02 Dec 2017 06:58  Phos  4.8     12-01  Mg     2.9     12-01    TPro  5.9<L>  /  Alb  2.5<L>  /  TBili  0.8  /  DBili  x   /  AST  27  /  ALT  20  /  AlkPhos  76  12-02                          7.6    5.7   )-----------( 119      ( 02 Dec 2017 06:58 )             23.5    MEDICATIONS  (STANDING):  ALBUTerol    0.083% 2.5 milliGRAM(s) Nebulizer every 8 hours  amLODIPine   Tablet 5 milliGRAM(s) Oral daily  chlorhexidine 0.12% Liquid 15 milliLiter(s) Swish and Spit two times a day  dextrose 5%. 1000 milliLiter(s) (50 mL/Hr) IV Continuous <Continuous>  dextrose 50% Injectable 12.5 Gram(s) IV Push once  dextrose 50% Injectable 25 Gram(s) IV Push once  dextrose 50% Injectable 25 Gram(s) IV Push once  heparin  Injectable 5000 Unit(s) SubCutaneous every 8 hours  influenza   Vaccine 0.5 milliLiter(s) IntraMuscular once  insulin lispro (HumaLOG) corrective regimen sliding scale   SubCutaneous Before meals and at bedtime  metoprolol     tartrate 25 milliGRAM(s) Oral every 8 hours  pantoprazole  Injectable 40 milliGRAM(s) IV Push daily  piperacillin/tazobactam IVPB. 3.375 Gram(s) IV Intermittent every 12 hours    MEDICATIONS  (PRN):  dextrose Gel 1 Dose(s) Oral once PRN Blood Glucose LESS THAN 70 milliGRAM(s)/deciliter  glucagon  Injectable 1 milliGRAM(s) IntraMuscular once PRN Glucose LESS THAN 70 milligrams/deciliter    REVIEW OF SYSTEMS:    CONSTITUTIONAL: shortness of breath  EYES: No eye pain, visual disturbances, or discharge  ENMT:  No difficulty hearing, tinnitus, vertigo; No sinus or throat pain  NECK: No pain or stiffness  BREASTS: No pain, masses, or nipple discharge  RESPIRATORY: No cough, wheezing, chills or hemoptysis; possitive shortness of breath  CARDIOVASCULAR: No chest pain, palpitations, dizziness, or leg swelling  GASTROINTESTINAL: No abdominal or epigastric pain. No nausea, vomiting, or hematemesis; No diarrhea or constipation. No melena or hematochezia.  GENITOURINARY: No dysuria, frequency, hematuria, or incontinence  NEUROLOGICAL: No headaches, memory loss, loss of strength, numbness, or tremors  SKIN: No itching, burning, rashes, or lesions   LYMPH NODES: No enlarged glands  ENDOCRINE: No heat or cold intolerance; No hair loss  MUSCULOSKELETAL: No joint pain or swelling; No muscle, back, or extremity pain  PSYCHIATRIC: No depression, anxiety, mood swings, or difficulty sleeping  HEME/LYMPH: No easy bruising, or bleeding gums  ALLERY AND IMMUNOLOGIC: No hives or eczema      Physicial Exam:     Constitutional: NAD  HEENT: PERRLA, EOMI, no drainage or redness  Neck: No bruits; no thyromegaly or nodules,  No JVD  Back: Normal spine flexure, No CVA tenderness, No deformity or limitation of movement  Respiratory: Breath Sounds equal & clear to percussion & auscultation, positive accessory muscle use with SOB and hypoxia   Cardiovascular: Regular rate & rhythm, normal S1, S2; no murmurs, gallops or rubs; no S3, S4  Gastrointestinal: Soft, non-tender, non distended no hepatosplenomegaly, normal bowel sounds  Extremities: No peripheral edema, No cyanosis, clubbing   Vascular: Equal and normal pulses: 2+ peripheral pulses throughout  Neurological: GCS:    A&O x 3; no sensory, motor  deficits, normal reflexes  Psychiatric: Normal mood, normal affect  Musculoskeletal: No joint pain, swelling or deformity; no limitation of movement  Skin: No rashes

## 2017-12-02 NOTE — PROGRESS NOTE ADULT - ASSESSMENT
61 y/o male with PMH of PVD s/p left BKA, ETOH, ? COPD, ? ckd was sent from [facility] for evaluation of low Pulse ox.  Pt had cardiopulmonary arrest in ED due to acute respiratory failure. .Pt is intubated, +coker cath. ? aspiration - empirically on antibiotics. elevated lactate-4.8 on admission which is trending down.  High BNP. CXR -right sided. patchy opacification. ECHO- normal as per cardiology. unequal pupils- right- 4mm and left 2mm. CAT head- no acute event. elevated Bun/Cr- 59/5.10. for abdominal sono.  s/p extubation. f

## 2017-12-02 NOTE — PROGRESS NOTE ADULT - PROBLEM SELECTOR PLAN 1
-Patient currently on NIVV ( BIPAP 12/6 40%   -titrate settings to maintain SaO2 >90%, or pH >7.25  -ABG   -HOB 30 degrees   -aggressive chest PT and suctioning   -daily off BIAP trial if clinical condition warrants, discuss with respiratory therapy  - consider need to continue diuresis

## 2017-12-02 NOTE — PROGRESS NOTE ADULT - SUBJECTIVE AND OBJECTIVE BOX
NEPHROLOGY PROGRESS NOTE    CHIEF COMPLAINT:  CKD    HPI:  Renal function about same.  Acidosis improving.     ROS:  denies SOB, but has cough    EXAM:  T(F): 97.6 (12-02-17 @ 12:04)  HR: 74 (12-02-17 @ 14:00)  BP: 155/50 (12-02-17 @ 14:00)  RR: 21 (12-02-17 @ 14:00)  SpO2: 91% (12-02-17 @ 14:00)  Urine output - incontinent    Conversant, in no apparent distress  Normal respiratory effort, lungs rales at right base  Heart RRR with no murmur, no peripheral edema         LABS                             7.6    5.7   )-----------( 119      ( 02 Dec 2017 06:58 )             23.5          12-02    147<H>  |  111<H>  |  60<H>  ----------------------------<  86  5.0   |  24  |  4.98<H>    Ca    8.0<L>      02 Dec 2017 06:58  Phos  4.8     12-01  Mg     2.9     12-01    TPro  5.9<L>  /  Alb  2.5<L>  /  TBili  0.8  /  DBili  x   /  AST  27  /  ALT  20  /  AlkPhos  76  12-02    BNP 10,481  ABG 7.41-37-57-23   2D ECHO:  normal LVEF         RADIOLOGY       Chest X-ray personally reviewed and shows right basilar air space disease & small bilateral effusions      ASSESSMENT:  1.  Advanced CKD with possible acute component, baseline Cr not known to me  2.  Respiratory failure with radiographic right sided infiltrate & small effusions  3.  Metabolic acidosis, resolved    PLAN:  D/C po bicarbonate  I do not have sense that fluid overload is significant issue but diuresis can be empirical  There are no acute dialytic indications at present

## 2017-12-03 LAB
ANION GAP SERPL CALC-SCNC: 13 MMOL/L — SIGNIFICANT CHANGE UP (ref 5–17)
BASOPHILS # BLD AUTO: 0 K/UL — SIGNIFICANT CHANGE UP (ref 0–0.2)
BASOPHILS NFR BLD AUTO: 0.6 % — SIGNIFICANT CHANGE UP (ref 0–2)
BUN SERPL-MCNC: 63 MG/DL — HIGH (ref 7–23)
CALCIUM SERPL-MCNC: 8.3 MG/DL — LOW (ref 8.4–10.5)
CHLORIDE SERPL-SCNC: 109 MMOL/L — HIGH (ref 96–108)
CO2 SERPL-SCNC: 23 MMOL/L — SIGNIFICANT CHANGE UP (ref 22–31)
CREAT SERPL-MCNC: 5.14 MG/DL — HIGH (ref 0.5–1.3)
EOSINOPHIL # BLD AUTO: 0.4 K/UL — SIGNIFICANT CHANGE UP (ref 0–0.5)
EOSINOPHIL NFR BLD AUTO: 5.6 % — SIGNIFICANT CHANGE UP (ref 0–6)
GLUCOSE BLDC GLUCOMTR-MCNC: 118 MG/DL — HIGH (ref 70–99)
GLUCOSE BLDC GLUCOMTR-MCNC: 163 MG/DL — HIGH (ref 70–99)
GLUCOSE BLDC GLUCOMTR-MCNC: 171 MG/DL — HIGH (ref 70–99)
GLUCOSE BLDC GLUCOMTR-MCNC: 182 MG/DL — HIGH (ref 70–99)
GLUCOSE SERPL-MCNC: 120 MG/DL — HIGH (ref 70–99)
HCT VFR BLD CALC: 22.9 % — LOW (ref 39–50)
HGB BLD-MCNC: 7.6 G/DL — LOW (ref 13–17)
HYPOCHROMIA BLD QL: SLIGHT — SIGNIFICANT CHANGE UP
LYMPHOCYTES # BLD AUTO: 0.5 K/UL — LOW (ref 1–3.3)
LYMPHOCYTES # BLD AUTO: 8.1 % — LOW (ref 13–44)
MANUAL SMEAR VERIFICATION: SIGNIFICANT CHANGE UP
MCHC RBC-ENTMCNC: 31.4 PG — SIGNIFICANT CHANGE UP (ref 27–34)
MCHC RBC-ENTMCNC: 33.2 GM/DL — SIGNIFICANT CHANGE UP (ref 32–36)
MCV RBC AUTO: 94.7 FL — SIGNIFICANT CHANGE UP (ref 80–100)
MONOCYTES # BLD AUTO: 0.2 K/UL — SIGNIFICANT CHANGE UP (ref 0–0.9)
MONOCYTES NFR BLD AUTO: 3.5 % — SIGNIFICANT CHANGE UP (ref 2–14)
NEUTROPHILS # BLD AUTO: 5.3 K/UL — SIGNIFICANT CHANGE UP (ref 1.8–7.4)
NEUTROPHILS NFR BLD AUTO: 82.3 % — HIGH (ref 43–77)
PLAT MORPH BLD: NORMAL — SIGNIFICANT CHANGE UP
PLATELET # BLD AUTO: 121 K/UL — LOW (ref 150–400)
POTASSIUM SERPL-MCNC: 4.9 MMOL/L — SIGNIFICANT CHANGE UP (ref 3.5–5.3)
POTASSIUM SERPL-SCNC: 4.9 MMOL/L — SIGNIFICANT CHANGE UP (ref 3.5–5.3)
RBC # BLD: 2.41 M/UL — LOW (ref 4.2–5.8)
RBC # FLD: 14.7 % — HIGH (ref 10.3–14.5)
RBC BLD AUTO: NORMAL — SIGNIFICANT CHANGE UP
SODIUM SERPL-SCNC: 145 MMOL/L — SIGNIFICANT CHANGE UP (ref 135–145)
WBC # BLD: 6.4 K/UL — SIGNIFICANT CHANGE UP (ref 3.8–10.5)
WBC # FLD AUTO: 6.4 K/UL — SIGNIFICANT CHANGE UP (ref 3.8–10.5)

## 2017-12-03 PROCEDURE — 99233 SBSQ HOSP IP/OBS HIGH 50: CPT

## 2017-12-03 PROCEDURE — 76604 US EXAM CHEST: CPT | Mod: 26

## 2017-12-03 RX ADMIN — PIPERACILLIN AND TAZOBACTAM 25 GRAM(S): 4; .5 INJECTION, POWDER, LYOPHILIZED, FOR SOLUTION INTRAVENOUS at 06:11

## 2017-12-03 RX ADMIN — Medication 25 MILLIGRAM(S): at 06:08

## 2017-12-03 RX ADMIN — AMLODIPINE BESYLATE 5 MILLIGRAM(S): 2.5 TABLET ORAL at 06:08

## 2017-12-03 RX ADMIN — HEPARIN SODIUM 5000 UNIT(S): 5000 INJECTION INTRAVENOUS; SUBCUTANEOUS at 14:51

## 2017-12-03 RX ADMIN — CHLORHEXIDINE GLUCONATE 15 MILLILITER(S): 213 SOLUTION TOPICAL at 06:08

## 2017-12-03 RX ADMIN — Medication 1: at 22:04

## 2017-12-03 RX ADMIN — ALBUTEROL 2.5 MILLIGRAM(S): 90 AEROSOL, METERED ORAL at 23:44

## 2017-12-03 RX ADMIN — Medication 1: at 17:04

## 2017-12-03 RX ADMIN — HEPARIN SODIUM 5000 UNIT(S): 5000 INJECTION INTRAVENOUS; SUBCUTANEOUS at 22:06

## 2017-12-03 RX ADMIN — ALBUTEROL 2.5 MILLIGRAM(S): 90 AEROSOL, METERED ORAL at 07:37

## 2017-12-03 RX ADMIN — PANTOPRAZOLE SODIUM 40 MILLIGRAM(S): 20 TABLET, DELAYED RELEASE ORAL at 12:16

## 2017-12-03 RX ADMIN — Medication 1: at 12:16

## 2017-12-03 RX ADMIN — Medication 25 MILLIGRAM(S): at 14:51

## 2017-12-03 RX ADMIN — Medication 25 MILLIGRAM(S): at 22:06

## 2017-12-03 RX ADMIN — CHLORHEXIDINE GLUCONATE 15 MILLILITER(S): 213 SOLUTION TOPICAL at 17:04

## 2017-12-03 RX ADMIN — HEPARIN SODIUM 5000 UNIT(S): 5000 INJECTION INTRAVENOUS; SUBCUTANEOUS at 06:08

## 2017-12-03 RX ADMIN — PIPERACILLIN AND TAZOBACTAM 25 GRAM(S): 4; .5 INJECTION, POWDER, LYOPHILIZED, FOR SOLUTION INTRAVENOUS at 17:04

## 2017-12-03 NOTE — PROGRESS NOTE ADULT - PROBLEM SELECTOR PLAN 7
Neuro- Dr.Kishore Monique.  CT head- no acute pathology.  Follow up clinically
Neuro- Dr.Kishore Monique.  CT head- no acute pathology.  Follow up clinically

## 2017-12-03 NOTE — PROGRESS NOTE ADULT - SUBJECTIVE AND OBJECTIVE BOX
CC: Patient is a 62y old  Male who presents with a chief complaint of c/o SOB, respiratory distress and went into cardiac arrest in the ER     HPI:  61 y/o male with PMH of PVD s/p left BKA, ETOH,  COPD,  was sent from [facility] for evaluation of low Pulse ox. He went into cardiopulmonary arrest in ED due to acute hypoxic  respiratory failure. .There is qustion of aspiration preceding the event as cause for initial hypoxia and placed empirically on antibiotics. although the BNP was elevated and acute CHF exacerbation cannot be ruled out as cause. The chest xray showed patchy opacification on the right, TTE showed minimal mitral prolapse but otherwise was normal, post arrest CT brain showed no acute events.     Patient returned to bipap this afternoon for increased work of breathing. Not tolerating all that well. Becomes agitated.        PAST MEDICAL & SURGICAL HISTORY:  Peripheral Arterial Disease  Controlled Type 2 Diabetes with Leg or Foot Ulcer  ETOH Abuse  Amputation, Below Knee, Unilateral, Traumatic    FAMILY HISTORY:  ICU Vital Signs Last 24 Hrs  T(C): 37 (03 Dec 2017 16:04), Max: 37 (03 Dec 2017 16:04)  T(F): 98.6 (03 Dec 2017 16:04), Max: 98.6 (03 Dec 2017 16:04)  HR: 65 (03 Dec 2017 18:00) (56 - 72)  BP: 159/67 (03 Dec 2017 18:00) (136/76 - 164/72)  BP(mean): 94 (03 Dec 2017 18:00) (84 - 103)  ABP: --  ABP(mean): --  RR: 17 (03 Dec 2017 18:00) (12 - 19)  SpO2: 94% (03 Dec 2017 18:00) (86% - 96%)    I&O's Detail    02 Dec 2017 07:01  -  03 Dec 2017 07:00  --------------------------------------------------------  IN:    IV PiggyBack: 100 mL    Oral Fluid: 360 mL    sodium chloride 0.45%: 50 mL  Total IN: 510 mL    OUT:    Voided: 450 mL  Total OUT: 450 mL    Total NET: 60 mL      03 Dec 2017 07:01  -  03 Dec 2017 20:04  --------------------------------------------------------  IN:    IV PiggyBack: 100 mL  Total IN: 100 mL    OUT:    Voided: 450 mL  Total OUT: 450 mL    Total NET: -350 mL      MEDICATIONS  (STANDING):  ALBUTerol    0.083% 2.5 milliGRAM(s) Nebulizer every 8 hours  amLODIPine   Tablet 5 milliGRAM(s) Oral daily  chlorhexidine 0.12% Liquid 15 milliLiter(s) Swish and Spit two times a day  dextrose 5%. 1000 milliLiter(s) (50 mL/Hr) IV Continuous <Continuous>  dextrose 50% Injectable 12.5 Gram(s) IV Push once  dextrose 50% Injectable 25 Gram(s) IV Push once  dextrose 50% Injectable 25 Gram(s) IV Push once  heparin  Injectable 5000 Unit(s) SubCutaneous every 8 hours  influenza   Vaccine 0.5 milliLiter(s) IntraMuscular once  insulin lispro (HumaLOG) corrective regimen sliding scale   SubCutaneous Before meals and at bedtime  metoprolol     tartrate 25 milliGRAM(s) Oral every 8 hours  pantoprazole  Injectable 40 milliGRAM(s) IV Push daily  piperacillin/tazobactam IVPB. 3.375 Gram(s) IV Intermittent every 12 hours    MEDICATIONS  (PRN):  dextrose Gel 1 Dose(s) Oral once PRN Blood Glucose LESS THAN 70 milliGRAM(s)/deciliter  glucagon  Injectable 1 milliGRAM(s) IntraMuscular once PRN Glucose LESS THAN 70 milligrams/deciliter                          7.6    6.4   )-----------( 121      ( 03 Dec 2017 06:15 )             22.9     12-03    145  |  109<H>  |  63<H>  ----------------------------<  120<H>  4.9   |  23  |  5.14<H>    Ca    8.3<L>      03 Dec 2017 06:15    TPro  5.9<L>  /  Alb  2.5<L>  /  TBili  0.8  /  DBili  x   /  AST  27  /  ALT  20  /  AlkPhos  76  12-02      REVIEW OF SYSTEMS:    CONSTITUTIONAL: shortness of breath  EYES: No eye pain, visual disturbances, or discharge  ENMT:  No difficulty hearing, tinnitus, vertigo; No sinus or throat pain  NECK: No pain or stiffness  BREASTS: No pain, masses, or nipple discharge  RESPIRATORY: No cough, wheezing, chills or hemoptysis; positive shortness of breath  CARDIOVASCULAR: No chest pain, palpitations, dizziness, or leg swelling  GASTROINTESTINAL: No abdominal or epigastric pain. No nausea, vomiting, or hematemesis; No diarrhea or constipation. No melena or hematochezia.  GENITOURINARY: No dysuria, frequency, hematuria, or incontinence  NEUROLOGICAL: No headaches, memory loss, loss of strength, numbness, or tremors  SKIN: No itching, burning, rashes, or lesions   LYMPH NODES: No enlarged glands  ENDOCRINE: No heat or cold intolerance; No hair loss  MUSCULOSKELETAL: No joint pain or swelling; No muscle, back, or extremity pain  PSYCHIATRIC: No depression, anxiety, mood swings, or difficulty sleeping  HEME/LYMPH: No easy bruising, or bleeding gums  ALLERY AND IMMUNOLOGIC: No hives or eczema      Physicial Exam:     Constitutional: NAD  HEENT: PERRLA, EOMI, no drainage or redness  Neck: No bruits; no thyromegaly or nodules,  No JVD  Back: Normal spine flexure, No CVA tenderness, No deformity or limitation of movement  Respiratory: Breath Sounds equal & clear to percussion & auscultation, positive accessory muscle use with SOB and hypoxia   Cardiovascular: Regular rate & rhythm, normal S1, S2; no murmurs, gallops or rubs; no S3, S4  Gastrointestinal: Soft, non-tender, non distended no hepatosplenomegaly, normal bowel sounds  Extremities: No peripheral edema, No cyanosis, clubbing   Vascular: Equal and normal pulses: 2+ peripheral pulses throughout  Neurological: GCS:    A&O x 3; no sensory, motor  deficits, normal reflexes. no tremors  Psychiatric: Normal mood, normal affect  Musculoskeletal: No joint pain, swelling or deformity; no limitation of movement  Skin: No rashes    CC Time: 30 minutes

## 2017-12-03 NOTE — PROGRESS NOTE ADULT - SUBJECTIVE AND OBJECTIVE BOX
REASON FOR VISIT: S/p cardiac arrest    HPI: 62 year old man with a history of chronic illness, severe PVD s/p left BKA, anemia, peripheral neuropathy, uncontrolled DM, etOH abuse transferred from his long term care facility for the evaluation of hypoxia; now s/p asystolic arrest in ED due to hypoxic respiratory failure; also with renal failure.    17:  Sedated on vent; more responsive per RN.  17: extubated and breathing better but still SOB and sating in high 80's to low 90's.  12/3/17: resting and sleeping comfortably, Sat in 92% range.      MEDICATIONS  (STANDING):  ALBUTerol    0.083% 2.5 milliGRAM(s) Nebulizer every 8 hours  amLODIPine   Tablet 5 milliGRAM(s) Oral daily  chlorhexidine 0.12% Liquid 15 milliLiter(s) Swish and Spit two times a day  dextrose 5%. 1000 milliLiter(s) (50 mL/Hr) IV Continuous <Continuous>  dextrose 50% Injectable 12.5 Gram(s) IV Push once  dextrose 50% Injectable 25 Gram(s) IV Push once  dextrose 50% Injectable 25 Gram(s) IV Push once  heparin  Injectable 5000 Unit(s) SubCutaneous every 8 hours  influenza   Vaccine 0.5 milliLiter(s) IntraMuscular once  insulin lispro (HumaLOG) corrective regimen sliding scale   SubCutaneous Before meals and at bedtime  metoprolol     tartrate 25 milliGRAM(s) Oral every 8 hours  pantoprazole  Injectable 40 milliGRAM(s) IV Push daily  piperacillin/tazobactam IVPB. 3.375 Gram(s) IV Intermittent every 12 hours    MEDICATIONS  (PRN):  dextrose Gel 1 Dose(s) Oral once PRN Blood Glucose LESS THAN 70 milliGRAM(s)/deciliter  glucagon  Injectable 1 milliGRAM(s) IntraMuscular once PRN Glucose LESS THAN 70 milligrams/deciliter      Vital Signs Last 24 Hrs  T(C): 36.6 (03 Dec 2017 04:04), Max: 36.8 (02 Dec 2017 20:00)  T(F): 97.9 (03 Dec 2017 04:04), Max: 98.3 (02 Dec 2017 20:00)  HR: 65 (03 Dec 2017 10:00) (56 - 74)  BP: 136/76 (03 Dec 2017 10:00) (136/76 - 164/72)  BP(mean): 92 (03 Dec 2017 10:00) (81 - 103)  RR: 17 (03 Dec 2017 10:00) (12 - 21)  SpO2: 92% (03 Dec 2017 10:00) (86% - 96%)    I&O's Detail    02 Dec 2017 07:01  -  03 Dec 2017 07:00  --------------------------------------------------------  IN:    IV PiggyBack: 100 mL    Oral Fluid: 360 mL    sodium chloride 0.45%: 50 mL  Total IN: 510 mL    OUT:    Voided: 450 mL  Total OUT: 450 mL    Total NET: 60 mL          Daily     Daily Weight in k.4 (03 Dec 2017 04:04)        Daily     Daily Weight in k.6 (02 Dec 2017 04:02)      PHYSICAL EXAM:  Constitutional: awake and in no distress but mildly tachypneic  Pulmonary: decreased breath sounds (R>L), scant ronchi bilaterally  Cardiovascular: S1 and S2, mild bradycardia, 2/6 ROSARIO LUSB  Gastrointestinal: Abdomen is soft.   Lymph: No pedal edema. S/p BKA  Skin: No rash.    LABS:                               7.6    6.4   )-----------( 121      ( 03 Dec 2017 06:15 )             22.9     12-03    145  |  109<H>  |  63<H>  ----------------------------<  120<H>  4.9   |  23  |  5.14<H>    Ca    8.3<L>      03 Dec 2017 06:15    TPro  5.9<L>  /  Alb  2.5<L>  /  TBili  0.8  /  DBili  x   /  AST  27  /  ALT  20  /  AlkPhos  76  12-02        LIVER FUNCTIONS - ( 02 Dec 2017 06:58 )  Alb: 2.5 g/dL / Pro: 5.9 g/dL / ALK PHOS: 76 U/L / ALT: 20 U/L DA / AST: 27 U/L / GGT: x                 LIVER FUNCTIONS - ( 02 Dec 2017 06:58 )  Alb: 2.5 g/dL / Pro: 5.9 g/dL / ALK PHOS: 76 U/L / ALT: 20 U/L DA / AST: 27 U/L / GGT: x              @ 05:40 Pro Bnp [id number]    ECG ( @ 6:03): Sinus bradycardia 59 bpm, low QRS voltage in limb leads, possible inferior infarct (old), mildly prolonged QTc    CT Chest No Cont (17 @ 08:25): Small bilateral pleural effusions.  Bilateral airspace disease present. Airspace disease present in the right upper lobe right middle lobe and right lower lobe. Airspace disease visible in the left lower lobe. No mediastinal lesions evident. Hilar regions obscured by bilateral lung pathology. Cardiomegaly and coronary artery calcifications present. No evidence of pericardial effusion. No evidence of thoracic aortic aneurysm. The central airway is intact. An endotracheal tube is present. No adrenal  lesions. The spleen is not enlarged. No acute osseous abnormalities.    US Transthoracic Echocardiogram w/Doppler Complete (17 @ 08:14) >  1. Normal left ventricular size and function. LVEF estimated at 65-70%.  2. Normal right ventricular size and function.  3. Minimal prolapse of the anterior mitral leaflet. Mild mitral stenosis.  Minimal mitral regurgitation.  4. Mild aortic stenosis.  5. Mild tricuspid regurgitation.     Tele: Sinus rhythm

## 2017-12-03 NOTE — PROGRESS NOTE ADULT - ASSESSMENT
63 y/o male with PMH of PVD s/p left BKA, ETOH, ? COPD, ? ckd was sent from [facility] for evaluation of low Pulse ox.  Pt had cardiopulmonary arrest in ED due to acute respiratory failure. .Pt was intubated, now extubated.

## 2017-12-03 NOTE — PROGRESS NOTE ADULT - PROBLEM SELECTOR PLAN 5
overall better  s/p diuresis on 12/2  I and O  o2 support  keep sat > 88 pct  BIPAP as needed   cont ICU care  prognosis guarded

## 2017-12-03 NOTE — PROGRESS NOTE ADULT - SUBJECTIVE AND OBJECTIVE BOX
Patient seen in follow up for EDWIN, CKD, no acute distress; no SOB n/v    MEDICATIONS  (STANDING):  ALBUTerol    0.083% 2.5 milliGRAM(s) Nebulizer every 8 hours  amLODIPine   Tablet 5 milliGRAM(s) Oral daily  chlorhexidine 0.12% Liquid 15 milliLiter(s) Swish and Spit two times a day  dextrose 5%. 1000 milliLiter(s) (50 mL/Hr) IV Continuous <Continuous>  dextrose 50% Injectable 12.5 Gram(s) IV Push once  dextrose 50% Injectable 25 Gram(s) IV Push once  dextrose 50% Injectable 25 Gram(s) IV Push once  heparin  Injectable 5000 Unit(s) SubCutaneous every 8 hours  influenza   Vaccine 0.5 milliLiter(s) IntraMuscular once  insulin lispro (HumaLOG) corrective regimen sliding scale   SubCutaneous Before meals and at bedtime  metoprolol     tartrate 25 milliGRAM(s) Oral every 8 hours  pantoprazole  Injectable 40 milliGRAM(s) IV Push daily  piperacillin/tazobactam IVPB. 3.375 Gram(s) IV Intermittent every 12 hours    MEDICATIONS  (PRN):  dextrose Gel 1 Dose(s) Oral once PRN Blood Glucose LESS THAN 70 milliGRAM(s)/deciliter  glucagon  Injectable 1 milliGRAM(s) IntraMuscular once PRN Glucose LESS THAN 70 milligrams/deciliter    PHYSICAL EXAM:      T(C): 36.6 (12-03-17 @ 04:04), Max: 36.8 (12-02-17 @ 20:00)  HR: 64 (12-03-17 @ 12:00) (56 - 74)  BP: 156/67 (12-03-17 @ 12:00) (136/76 - 164/72)  RR: 14 (12-03-17 @ 12:00) (12 - 21)  SpO2: 90% (12-03-17 @ 12:00) (86% - 96%)  Wt(kg): --  Respiratory: clear anteriorly, decreased BS at bases  Cardiovascular: S1 S2  Gastrointestinal: soft NT ND +BS  Extremities:   edema                                    7.6    6.4   )-----------( 121      ( 03 Dec 2017 06:15 )             22.9     12-03    145  |  109<H>  |  63<H>  ----------------------------<  120<H>  4.9   |  23  |  5.14<H>    Ca    8.3<L>      03 Dec 2017 06:15    TPro  5.9<L>  /  Alb  2.5<L>  /  TBili  0.8  /  DBili  x   /  AST  27  /  ALT  20  /  AlkPhos  76  12-02    ABG - ( 02 Dec 2017 01:13 )  pH: x     /  pCO2: 37    /  pO2: 57    / HCO3: 23    / Base Excess: -1.4  /  SaO2: 90                LIVER FUNCTIONS - ( 02 Dec 2017 06:58 )  Alb: 2.5 g/dL / Pro: 5.9 g/dL / ALK PHOS: 76 U/L / ALT: 20 U/L DA / AST: 27 U/L / GGT: x             Assessment and Plan:    EDWIN, CKD 4-5; stable;  Maintenance diuretic rx   Nearing HD indications.  Fe panel.

## 2017-12-03 NOTE — PROGRESS NOTE ADULT - SUBJECTIVE AND OBJECTIVE BOX
Patient is a 62y old  Male who presents with a chief complaint of c/o SOB, respiratory distress, s/p cardiopulmonary arrest (30 Nov 2017 10:32)      INTERVAL History of Present Illness/OVERNIGHT EVENTS: sats 88% on 4 liters.  oxygen raised to 5 liters.  no distress.  hypoxia due to extensive pneumonia and moderate bilateral effusions.  DNR status.  CKD 5 - may benefit from HD initiation in near future if c/w GOC.    MEDICATIONS  (STANDING):  ALBUTerol    0.083% 2.5 milliGRAM(s) Nebulizer every 8 hours  amLODIPine   Tablet 5 milliGRAM(s) Oral daily  chlorhexidine 0.12% Liquid 15 milliLiter(s) Swish and Spit two times a day  dextrose 5%. 1000 milliLiter(s) (50 mL/Hr) IV Continuous <Continuous>  dextrose 50% Injectable 12.5 Gram(s) IV Push once  dextrose 50% Injectable 25 Gram(s) IV Push once  dextrose 50% Injectable 25 Gram(s) IV Push once  heparin  Injectable 5000 Unit(s) SubCutaneous every 8 hours  influenza   Vaccine 0.5 milliLiter(s) IntraMuscular once  insulin lispro (HumaLOG) corrective regimen sliding scale   SubCutaneous Before meals and at bedtime  metoprolol     tartrate 25 milliGRAM(s) Oral every 8 hours  pantoprazole  Injectable 40 milliGRAM(s) IV Push daily  piperacillin/tazobactam IVPB. 3.375 Gram(s) IV Intermittent every 12 hours    MEDICATIONS  (PRN):  dextrose Gel 1 Dose(s) Oral once PRN Blood Glucose LESS THAN 70 milliGRAM(s)/deciliter  glucagon  Injectable 1 milliGRAM(s) IntraMuscular once PRN Glucose LESS THAN 70 milligrams/deciliter      Allergies    No Known Allergies    Intolerances        REVIEW OF SYSTEMS:  Negative unless otherwise specified above.    Vital Signs Last 24 Hrs  T(C): 36.6 (03 Dec 2017 04:04), Max: 36.8 (02 Dec 2017 20:00)  T(F): 97.9 (03 Dec 2017 04:04), Max: 98.3 (02 Dec 2017 20:00)  HR: 64 (03 Dec 2017 12:00) (56 - 74)  BP: 156/67 (03 Dec 2017 12:00) (136/76 - 164/72)  BP(mean): 92 (03 Dec 2017 12:00) (81 - 103)  RR: 14 (03 Dec 2017 12:00) (12 - 21)  SpO2: 90% (03 Dec 2017 12:00) (86% - 96%)        PHYSICAL EXAM:  GENERAL: No apparent distress, appears chronically ill  HEAD:  Atraumatic, Normocephalic  EYES: conjunctiva and sclera clear  ENMT: Moist mucous membranes  NECK: Supple, No Jugular venous distension  CHEST/LUNG: decreased BS at bases  HEART: Regular rate and rhythm  ABDOMEN: Soft, Nontender, Nondistended; Bowel sounds present  EXTREMITIES:  left BKA  SKIN: No rashes or lesions  NERVOUS SYSTEM:  Alert & Oriented; Bilateral Lower extremity mobile, sensation to light touch intact      LABS:                        7.6    6.4   )-----------( 121      ( 03 Dec 2017 06:15 )             22.9     03 Dec 2017 06:15    145    |  109    |  63     ----------------------------<  120    4.9     |  23     |  5.14     Ca    8.3        03 Dec 2017 06:15          CAPILLARY BLOOD GLUCOSE      POCT Blood Glucose.: 182 mg/dL (03 Dec 2017 11:52)  POCT Blood Glucose.: 118 mg/dL (03 Dec 2017 07:59)  POCT Blood Glucose.: 141 mg/dL (02 Dec 2017 21:59)  POCT Blood Glucose.: 173 mg/dL (02 Dec 2017 16:45)      RADIOLOGY & ADDITIONAL TESTS:    Images reviewed personally - CXR shows pneumonia and effusions    Consultant Notes Reviewed and Care Discussed with relevant Consultants/Other Providers.

## 2017-12-03 NOTE — PROGRESS NOTE ADULT - SUBJECTIVE AND OBJECTIVE BOX
Date/Time Patient Seen:  		  Referring MD:   Data Reviewed	       Patient is a 62y old  Male who presents with a chief complaint of c/o SOB, respiratory distress, s/p cardiopulmonary arrest (30 Nov 2017 10:32)    in bed  on o2  vs and meds reviewed      Subjective/HPI     PAST MEDICAL & SURGICAL HISTORY:  Peripheral Arterial Disease  Controlled Type 2 Diabetes with Leg or Foot Ulcer  ETOH Abuse  Amputation, Below Knee, Unilateral, Traumatic  Amputation, Below Elbow, Unilateral, Traumatic        Medication list         MEDICATIONS  (STANDING):  ALBUTerol    0.083% 2.5 milliGRAM(s) Nebulizer every 8 hours  amLODIPine   Tablet 5 milliGRAM(s) Oral daily  chlorhexidine 0.12% Liquid 15 milliLiter(s) Swish and Spit two times a day  dextrose 5%. 1000 milliLiter(s) (50 mL/Hr) IV Continuous <Continuous>  dextrose 50% Injectable 12.5 Gram(s) IV Push once  dextrose 50% Injectable 25 Gram(s) IV Push once  dextrose 50% Injectable 25 Gram(s) IV Push once  heparin  Injectable 5000 Unit(s) SubCutaneous every 8 hours  influenza   Vaccine 0.5 milliLiter(s) IntraMuscular once  insulin lispro (HumaLOG) corrective regimen sliding scale   SubCutaneous Before meals and at bedtime  metoprolol     tartrate 25 milliGRAM(s) Oral every 8 hours  pantoprazole  Injectable 40 milliGRAM(s) IV Push daily  piperacillin/tazobactam IVPB. 3.375 Gram(s) IV Intermittent every 12 hours    MEDICATIONS  (PRN):  dextrose Gel 1 Dose(s) Oral once PRN Blood Glucose LESS THAN 70 milliGRAM(s)/deciliter  glucagon  Injectable 1 milliGRAM(s) IntraMuscular once PRN Glucose LESS THAN 70 milligrams/deciliter         Vitals log        ICU Vital Signs Last 24 Hrs  T(C): 36.6 (03 Dec 2017 04:04), Max: 37 (02 Dec 2017 08:44)  T(F): 97.9 (03 Dec 2017 04:04), Max: 98.6 (02 Dec 2017 08:44)  HR: 63 (03 Dec 2017 08:00) (56 - 74)  BP: 145/69 (03 Dec 2017 08:00) (140/61 - 164/72)  BP(mean): 89 (03 Dec 2017 08:00) (81 - 103)  ABP: --  ABP(mean): --  RR: 17 (03 Dec 2017 08:00) (12 - 21)  SpO2: 91% (03 Dec 2017 08:00) (86% - 96%)           Input and Output:  I&O's Detail    02 Dec 2017 07:01  -  03 Dec 2017 07:00  --------------------------------------------------------  IN:    IV PiggyBack: 100 mL    Oral Fluid: 360 mL    sodium chloride 0.45%: 50 mL  Total IN: 510 mL    OUT:    Voided: 450 mL  Total OUT: 450 mL    Total NET: 60 mL          Lab Data                        7.6    6.4   )-----------( 121      ( 03 Dec 2017 06:15 )             22.9     12-03    145  |  109<H>  |  63<H>  ----------------------------<  120<H>  4.9   |  23  |  5.14<H>    Ca    8.3<L>      03 Dec 2017 06:15    TPro  5.9<L>  /  Alb  2.5<L>  /  TBili  0.8  /  DBili  x   /  AST  27  /  ALT  20  /  AlkPhos  76  12-02    ABG - ( 02 Dec 2017 01:13 )  pH: x     /  pCO2: 37    /  pO2: 57    / HCO3: 23    / Base Excess: -1.4  /  SaO2: 90                      Review of Systems	      Objective     Physical Examination  frail  weak  head at  heart s1s2  lungs dec BS  right BKA        Pertinent Lab findings & Imaging      Lizette:  NO   Adequate UO     I&O's Detail    02 Dec 2017 07:01  -  03 Dec 2017 07:00  --------------------------------------------------------  IN:    IV PiggyBack: 100 mL    Oral Fluid: 360 mL    sodium chloride 0.45%: 50 mL  Total IN: 510 mL    OUT:    Voided: 450 mL  Total OUT: 450 mL    Total NET: 60 mL               Discussed with:     Cultures:	        Radiology

## 2017-12-03 NOTE — PROGRESS NOTE ADULT - PROBLEM SELECTOR PROBLEM 8
CKD (chronic kidney disease) stage 5, GFR less than 15 ml/min
Pupil asymmetry
CKD (chronic kidney disease) stage 5, GFR less than 15 ml/min

## 2017-12-03 NOTE — PROGRESS NOTE ADULT - PROBLEM SELECTOR PLAN 1
stable , now s/p extubation.   c/w  SPCU level of care .  c/w iv zosyn.  keep sats>90%.  may need effusions tapped.

## 2017-12-03 NOTE — PROGRESS NOTE ADULT - PROBLEM SELECTOR PLAN 8
Avoid nephrotoxins .   Monitor BMP closely.   Renal evaluation appreciated.  may benefit from HD initiation in near future.  avoid nephrotoxics. monitor intake and output.

## 2017-12-03 NOTE — PROGRESS NOTE ADULT - PROBLEM SELECTOR PLAN 1
Continue to observe on NIV. May transition to high flow NC due to patient agitation   -HOB 30 degrees   -aggressive chest PT and suctioning

## 2017-12-03 NOTE — PROGRESS NOTE ADULT - SUBJECTIVE AND OBJECTIVE BOX
[facility] patient examined and chart reviewed. Patient being followed for     INTERVAL HPI/ OVERNIGHT EVENTS:      Past Medical History--  PAST MEDICAL & SURGICAL HISTORY:  Peripheral Arterial Disease  Controlled Type 2 Diabetes with Leg or Foot Ulcer  ETOH Abuse  Amputation, Below Knee, Unilateral, Traumatic      For details regarding the patient's social history, family history, and other miscellaneous elements, please refer the initial infectious diseases consultation and/or the admitting history and physical examination for this admission.      ROS:  Unable to obtain due to :     ROS:  CONSTITUTIONAL:  Negative fever or chills, feels well, good appetite  EYES:  Negative  blurry vision or double vision  CARDIOVASCULAR:  Negative for chest pain or palpitations  RESPIRATORY:  Negative for cough, wheezing, or SOB   GASTROINTESTINAL:  Negative for nausea, vomiting, diarrhea, constipation, or abdominal pain  GENITOURINARY:  Negative frequency, urgency , dysuria or hematuria   NEUROLOGIC:  No headache, confusion, dizziness, lightheadedness  All other systems were reviewed and are negative     Allergies:      Current inpatient medications :    ANTIBIOTICS/RELEVANT:  piperacillin/tazobactam IVPB. 3.375 Gram(s) IV Intermittent every 12 hours      ALBUTerol    0.083% 2.5 milliGRAM(s) Nebulizer every 8 hours  amLODIPine   Tablet 5 milliGRAM(s) Oral daily  chlorhexidine 0.12% Liquid 15 milliLiter(s) Swish and Spit two times a day  dextrose 5%. 1000 milliLiter(s) IV Continuous <Continuous>  dextrose 50% Injectable 12.5 Gram(s) IV Push once  dextrose 50% Injectable 25 Gram(s) IV Push once  dextrose 50% Injectable 25 Gram(s) IV Push once  dextrose Gel 1 Dose(s) Oral once PRN  glucagon  Injectable 1 milliGRAM(s) IntraMuscular once PRN  heparin  Injectable 5000 Unit(s) SubCutaneous every 8 hours  insulin lispro (HumaLOG) corrective regimen sliding scale   SubCutaneous Before meals and at bedtime  metoprolol     tartrate 25 milliGRAM(s) Oral every 8 hours  pantoprazole  Injectable 40 milliGRAM(s) IV Push daily      Objective:    12-02 @ 07:01  -  12-03 @ 07:00  --------------------------------------------------------  IN: 510 mL / OUT: 450 mL / NET: 60 mL      T(C): 36.6 (12-03-17 @ 04:04), Max: 36.8 (12-02-17 @ 20:00)  HR: 63 (12-03-17 @ 08:00) (56 - 74)  BP: 145/69 (12-03-17 @ 08:00) (140/61 - 164/72)  RR: 17 (12-03-17 @ 08:00) (12 - 21)  SpO2: 91% (12-03-17 @ 08:00) (86% - 96%)  Wt(kg): --      Physical Exam:  GEN: NAD, pleasant  HEENT: normocephalic and atraumatic. EOMI. [place]. Moist mucosa. Clear Posterior pharynx.  NECK: Supple. No carotid bruits.  No lymphadenopathy or thyromegaly.  LUNGS: Clear to auscultation.  HEART: Regular rate and rhythm without murmur.  ABDOMEN: Soft, nontender, and nondistended.  Positive bowel sounds.  No hepatosplenomegaly was noted.  EXTREMITIES: Without any cyanosis, clubbing, rash, lesions or edema.  NEUROLOGIC: A & O x3, No focal neurological deficits   SKIN: No ulceration or induration present.      LABS:                        7.6    6.4   )-----------( 121      ( 03 Dec 2017 06:15 )             22.9       12-03    145  |  109<H>  |  63<H>  ----------------------------<  120<H>  4.9   |  23  |  5.14<H>    Ca    8.3<L>      03 Dec 2017 06:15    TPro  5.9<L>  /  Alb  2.5<L>  /  TBili  0.8  /  DBili  x   /  AST  27  /  ALT  20  /  AlkPhos  76  12-02          ABG - ( 02 Dec 2017 01:13 )  pH: 7.41   /  pCO2: 37    /  pO2: 57    / HCO3: 23    / Base Excess: -1.4  /  SaO2: 90        Serum Pro-Brain Natriuretic Peptide (12.02.17 @ 08:27)    Serum Pro-Brain Natriuretic Peptide: 21484 pg/mL          RECENT CULTURES:    Culture - Sputum (collected 30 Nov 2017 16:46)  Source: .Sputum Sputum  Gram Stain (30 Nov 2017 20:54):    Moderate polymorphonuclear leukocytes per low power field    No Squamous epithelial cells per low power field    No organisms seen per oil power field  Final Report (02 Dec 2017 18:54):    No growth at 48 hours    Culture - Urine (collected 30 Nov 2017 12:09)  Source: .Urine Catheterized  Final Report (01 Dec 2017 11:57):    No growth    Culture - Blood (collected 30 Nov 2017 12:06)  Source: .Blood Blood  Preliminary Report (01 Dec 2017 13:01):    No growth to date.    Culture - Blood (collected 30 Nov 2017 12:06)  Source: .Blood Blood  Preliminary Report (01 Dec 2017 13:01):    No growth to date.        RADIOLOGY & ADDITIONAL STUDIES:  Xray Chest 1 View AP- PORTABLE-Urgent (12.02.17 @ 01:29) >  Carding mediastinal area the trachea is midline. Patient has been   extubated. There is persistent infiltrate in the right mid to lower lung.   There is increased opacification at the left lung base suggesting   effusion and/or infiltrate.    Impression: Persistent infiltrate in the right mid to lower lung  Worsening appearance of left lung base suggesting infiltrate and/or   pleural effusion.  Status post extubation        Assessment :  62 year old male hx of ? COPD, ETOH abuse, PVD, s/p right BKA , ? CKD vs EDWIN admitted with acuter cardiorespiratory arrest , primarily respiratory failure leading to cardiac arrest , has unequal pupils etiology is unclear, as Ct head is negative ,  seen by neurology  .    CXR shows extensive right sided infiltrates , has elevated lactate and severe metabolic acidosis    Ct chest reveals bilateral effusions with bilateral lower lobe consolidations ? aspiration vs fluid overload . Doubt he has worsening pneumonia without any significant productive cough, fever or leukocytosis    Plan :   - continue with IV Zosyn for now  - consider diuretics and dc IVF   - OOB , PT evaluation   - consider thoracocentesis to see if pleural effusion is exudate , will discuss with pulmonary   - serial CXR   - may need HD if no improvement in CXR    I have discussed the above plan of care with patient and  family in detail. They expressed understanding of the the treatment plan . Risks, benefits and alternatives discussed in detail. I have asked if they have any questions or concerns and appropriately addressed them to the best of my ability .      Critical care time greater then 35 minutes reviewing notes, labs data/ imaging , discussion with multidisciplinary team.    Thank you for allowing me to participate in care of your patient .        Gildardo Daley MD  [phone]

## 2017-12-03 NOTE — PROGRESS NOTE ADULT - ASSESSMENT
62 year old male s/p cardiopulmonary arrest extubated still with respiratory distress. Occasionally patient noted to desaturate to mid 80s,

## 2017-12-04 LAB
ANION GAP SERPL CALC-SCNC: 13 MMOL/L — SIGNIFICANT CHANGE UP (ref 5–17)
BUN SERPL-MCNC: 59 MG/DL — HIGH (ref 7–23)
CALCIUM SERPL-MCNC: 8.4 MG/DL — SIGNIFICANT CHANGE UP (ref 8.4–10.5)
CHLORIDE SERPL-SCNC: 109 MMOL/L — HIGH (ref 96–108)
CO2 SERPL-SCNC: 23 MMOL/L — SIGNIFICANT CHANGE UP (ref 22–31)
CREAT SERPL-MCNC: 5.01 MG/DL — HIGH (ref 0.5–1.3)
FERRITIN SERPL-MCNC: 173 NG/ML — SIGNIFICANT CHANGE UP (ref 30–400)
GLUCOSE BLDC GLUCOMTR-MCNC: 128 MG/DL — HIGH (ref 70–99)
GLUCOSE BLDC GLUCOMTR-MCNC: 131 MG/DL — HIGH (ref 70–99)
GLUCOSE BLDC GLUCOMTR-MCNC: 139 MG/DL — HIGH (ref 70–99)
GLUCOSE BLDC GLUCOMTR-MCNC: 170 MG/DL — HIGH (ref 70–99)
GLUCOSE SERPL-MCNC: 139 MG/DL — HIGH (ref 70–99)
HCT VFR BLD CALC: 22.5 % — LOW (ref 39–50)
HGB BLD-MCNC: 7.5 G/DL — LOW (ref 13–17)
IRON SATN MFR SERPL: 21 % — SIGNIFICANT CHANGE UP (ref 16–55)
IRON SATN MFR SERPL: 37 UG/DL — LOW (ref 45–165)
MCHC RBC-ENTMCNC: 31.3 PG — SIGNIFICANT CHANGE UP (ref 27–34)
MCHC RBC-ENTMCNC: 33.5 GM/DL — SIGNIFICANT CHANGE UP (ref 32–36)
MCV RBC AUTO: 93.5 FL — SIGNIFICANT CHANGE UP (ref 80–100)
PLATELET # BLD AUTO: 141 K/UL — LOW (ref 150–400)
POTASSIUM SERPL-MCNC: 4.6 MMOL/L — SIGNIFICANT CHANGE UP (ref 3.5–5.3)
POTASSIUM SERPL-SCNC: 4.6 MMOL/L — SIGNIFICANT CHANGE UP (ref 3.5–5.3)
PROCALCITONIN SERPL-MCNC: 32.71 NG/ML — HIGH (ref 0–0.04)
RBC # BLD: 2.4 M/UL — LOW (ref 4.2–5.8)
RBC # FLD: 14.7 % — HIGH (ref 10.3–14.5)
SODIUM SERPL-SCNC: 145 MMOL/L — SIGNIFICANT CHANGE UP (ref 135–145)
TIBC SERPL-MCNC: 177 UG/DL — LOW (ref 220–430)
UIBC SERPL-MCNC: 140 UG/DL — SIGNIFICANT CHANGE UP (ref 110–370)
WBC # BLD: 5.6 K/UL — SIGNIFICANT CHANGE UP (ref 3.8–10.5)
WBC # FLD AUTO: 5.6 K/UL — SIGNIFICANT CHANGE UP (ref 3.8–10.5)

## 2017-12-04 PROCEDURE — 71010: CPT | Mod: 26

## 2017-12-04 PROCEDURE — 99233 SBSQ HOSP IP/OBS HIGH 50: CPT

## 2017-12-04 RX ORDER — BUDESONIDE AND FORMOTEROL FUMARATE DIHYDRATE 160; 4.5 UG/1; UG/1
2 AEROSOL RESPIRATORY (INHALATION)
Qty: 0 | Refills: 0 | Status: DISCONTINUED | OUTPATIENT
Start: 2017-12-04 | End: 2017-12-08

## 2017-12-04 RX ORDER — FUROSEMIDE 40 MG
60 TABLET ORAL DAILY
Qty: 0 | Refills: 0 | Status: DISCONTINUED | OUTPATIENT
Start: 2017-12-04 | End: 2017-12-04

## 2017-12-04 RX ORDER — ERYTHROPOIETIN 10000 [IU]/ML
10000 INJECTION, SOLUTION INTRAVENOUS; SUBCUTANEOUS
Qty: 0 | Refills: 0 | Status: DISCONTINUED | OUTPATIENT
Start: 2017-12-04 | End: 2017-12-08

## 2017-12-04 RX ORDER — FUROSEMIDE 40 MG
60 TABLET ORAL EVERY 12 HOURS
Qty: 0 | Refills: 0 | Status: DISCONTINUED | OUTPATIENT
Start: 2017-12-04 | End: 2017-12-05

## 2017-12-04 RX ORDER — IPRATROPIUM/ALBUTEROL SULFATE 18-103MCG
3 AEROSOL WITH ADAPTER (GRAM) INHALATION EVERY 6 HOURS
Qty: 0 | Refills: 0 | Status: DISCONTINUED | OUTPATIENT
Start: 2017-12-04 | End: 2017-12-08

## 2017-12-04 RX ADMIN — Medication 1: at 11:01

## 2017-12-04 RX ADMIN — PANTOPRAZOLE SODIUM 40 MILLIGRAM(S): 20 TABLET, DELAYED RELEASE ORAL at 11:00

## 2017-12-04 RX ADMIN — HEPARIN SODIUM 5000 UNIT(S): 5000 INJECTION INTRAVENOUS; SUBCUTANEOUS at 21:56

## 2017-12-04 RX ADMIN — PIPERACILLIN AND TAZOBACTAM 25 GRAM(S): 4; .5 INJECTION, POWDER, LYOPHILIZED, FOR SOLUTION INTRAVENOUS at 17:10

## 2017-12-04 RX ADMIN — ERYTHROPOIETIN 10000 UNIT(S): 10000 INJECTION, SOLUTION INTRAVENOUS; SUBCUTANEOUS at 15:47

## 2017-12-04 RX ADMIN — AMLODIPINE BESYLATE 5 MILLIGRAM(S): 2.5 TABLET ORAL at 06:02

## 2017-12-04 RX ADMIN — HEPARIN SODIUM 5000 UNIT(S): 5000 INJECTION INTRAVENOUS; SUBCUTANEOUS at 06:02

## 2017-12-04 RX ADMIN — Medication 3 MILLILITER(S): at 20:02

## 2017-12-04 RX ADMIN — HEPARIN SODIUM 5000 UNIT(S): 5000 INJECTION INTRAVENOUS; SUBCUTANEOUS at 13:05

## 2017-12-04 RX ADMIN — Medication 25 MILLIGRAM(S): at 06:02

## 2017-12-04 RX ADMIN — PIPERACILLIN AND TAZOBACTAM 25 GRAM(S): 4; .5 INJECTION, POWDER, LYOPHILIZED, FOR SOLUTION INTRAVENOUS at 05:58

## 2017-12-04 RX ADMIN — Medication 25 MILLIGRAM(S): at 13:05

## 2017-12-04 RX ADMIN — Medication 60 MILLIGRAM(S): at 15:47

## 2017-12-04 RX ADMIN — Medication 25 MILLIGRAM(S): at 21:56

## 2017-12-04 RX ADMIN — Medication 3 MILLILITER(S): at 23:53

## 2017-12-04 RX ADMIN — Medication 3 MILLILITER(S): at 08:10

## 2017-12-04 NOTE — PROGRESS NOTE ADULT - SUBJECTIVE AND OBJECTIVE BOX
Patient is a 62y old  Male who presents with a chief complaint of c/o SOB, respiratory distress, s/p cardiopulmonary arrest (30 Nov 2017 10:32)      INTERVAL History of Present Illness/OVERNIGHT EVENTS: chronically ill.  no new complaints.  multiple consultants on the case.  poor prognosis.  decision to initiate HD per renal.  pulmonary care per Janay Alcantara/Ainsley    MEDICATIONS  (STANDING):  ALBUTerol/ipratropium for Nebulization 3 milliLiter(s) Nebulizer every 6 hours  amLODIPine   Tablet 5 milliGRAM(s) Oral daily  buDESOnide 160 MICROgram(s)/formoterol 4.5 MICROgram(s) Inhaler 2 Puff(s) Inhalation two times a day  dextrose 5%. 1000 milliLiter(s) (50 mL/Hr) IV Continuous <Continuous>  dextrose 50% Injectable 12.5 Gram(s) IV Push once  dextrose 50% Injectable 25 Gram(s) IV Push once  dextrose 50% Injectable 25 Gram(s) IV Push once  heparin  Injectable 5000 Unit(s) SubCutaneous every 8 hours  influenza   Vaccine 0.5 milliLiter(s) IntraMuscular once  insulin lispro (HumaLOG) corrective regimen sliding scale   SubCutaneous Before meals and at bedtime  metoprolol     tartrate 25 milliGRAM(s) Oral every 8 hours  pantoprazole  Injectable 40 milliGRAM(s) IV Push daily  piperacillin/tazobactam IVPB. 3.375 Gram(s) IV Intermittent every 12 hours    MEDICATIONS  (PRN):  dextrose Gel 1 Dose(s) Oral once PRN Blood Glucose LESS THAN 70 milliGRAM(s)/deciliter  glucagon  Injectable 1 milliGRAM(s) IntraMuscular once PRN Glucose LESS THAN 70 milligrams/deciliter      Allergies    No Known Allergies    Intolerances        REVIEW OF SYSTEMS:  Negative unless otherwise specified above.    Vital Signs Last 24 Hrs  T(C): 36.5 (04 Dec 2017 12:07), Max: 37 (03 Dec 2017 16:04)  T(F): 97.7 (04 Dec 2017 12:07), Max: 98.6 (03 Dec 2017 16:04)  HR: 55 (04 Dec 2017 11:15) (54 - 86)  BP: 135/96 (04 Dec 2017 10:00) (135/96 - 178/68)  BP(mean): 101 (04 Dec 2017 10:00) (89 - 117)  RR: 16 (04 Dec 2017 10:00) (12 - 24)  SpO2: 95% (04 Dec 2017 11:15) (89% - 98%)        PHYSICAL EXAM:  GENERAL: No apparent distress  HEAD:  Atraumatic, Normocephalic  EYES: conjunctiva and sclera clear  ENMT: Moist mucous membranes  NECK: Supple, No Jugular venous distension  CHEST/LUNG: decreased BS both lung bases  HEART: Regular rate and rhythm  ABDOMEN: Soft, Nontender, Nondistended; Bowel sounds present  EXTREMITIES:  left BKA  SKIN: No rashes or lesions  NERVOUS SYSTEM:  sleeping when seen earlier      LABS:                        7.5    5.6   )-----------( 141      ( 04 Dec 2017 06:41 )             22.5     04 Dec 2017 06:41    145    |  109    |  59     ----------------------------<  139    4.6     |  23     |  5.01     Ca    8.4        04 Dec 2017 06:41          CAPILLARY BLOOD GLUCOSE      POCT Blood Glucose.: 170 mg/dL (04 Dec 2017 10:58)  POCT Blood Glucose.: 131 mg/dL (04 Dec 2017 07:39)  POCT Blood Glucose.: 163 mg/dL (03 Dec 2017 21:52)  POCT Blood Glucose.: 171 mg/dL (03 Dec 2017 16:45)      RADIOLOGY & ADDITIONAL TESTS:    Images reviewed personally    Consultant Notes Reviewed and Care Discussed with relevant Consultants/Other Providers.

## 2017-12-04 NOTE — PROGRESS NOTE ADULT - ASSESSMENT
62 year old male s/p cardiopulmonary arrest extubated still with respiratory distress. Occasionally patient noted to desaturate to mid 80s,   Probablye pneumonia

## 2017-12-04 NOTE — PROGRESS NOTE ADULT - SUBJECTIVE AND OBJECTIVE BOX
Patient is a 62y old  Male who presents with a chief complaint of c/o SOB, respiratory distress, s/p cardiopulmonary arrest (30 Nov 2017 10:32)      Patient seen in follow up for EDWIN, CKD 4. Pt resting in bed. Confused. On oxygen mask.     PAST MEDICAL HISTORY:  Peripheral Arterial Disease  Controlled Type 2 Diabetes with Leg or Foot Ulcer  ETOH Abuse     MEDICATIONS  (STANDING):  ALBUTerol/ipratropium for Nebulization 3 milliLiter(s) Nebulizer every 6 hours  amLODIPine   Tablet 5 milliGRAM(s) Oral daily  buDESOnide 160 MICROgram(s)/formoterol 4.5 MICROgram(s) Inhaler 2 Puff(s) Inhalation two times a day  dextrose 5%. 1000 milliLiter(s) (50 mL/Hr) IV Continuous <Continuous>  dextrose 50% Injectable 12.5 Gram(s) IV Push once  dextrose 50% Injectable 25 Gram(s) IV Push once  dextrose 50% Injectable 25 Gram(s) IV Push once  heparin  Injectable 5000 Unit(s) SubCutaneous every 8 hours  influenza   Vaccine 0.5 milliLiter(s) IntraMuscular once  insulin lispro (HumaLOG) corrective regimen sliding scale   SubCutaneous Before meals and at bedtime  metoprolol     tartrate 25 milliGRAM(s) Oral every 8 hours  pantoprazole  Injectable 40 milliGRAM(s) IV Push daily  piperacillin/tazobactam IVPB. 3.375 Gram(s) IV Intermittent every 12 hours    MEDICATIONS  (PRN):  dextrose Gel 1 Dose(s) Oral once PRN Blood Glucose LESS THAN 70 milliGRAM(s)/deciliter  glucagon  Injectable 1 milliGRAM(s) IntraMuscular once PRN Glucose LESS THAN 70 milligrams/deciliter    T(C): 36.5 (12-04-17 @ 12:07), Max: 37 (12-03-17 @ 16:04)  HR: 51 (12-04-17 @ 14:00) (51 - 86)  BP: 138/60 (12-04-17 @ 14:00) (135/96 - 178/68)  RR: 12 (12-04-17 @ 14:00)  SpO2: 98% (12-04-17 @ 14:00)  Wt(kg): --  I&O's Detail    03 Dec 2017 07:01  -  04 Dec 2017 07:00  --------------------------------------------------------  IN:    IV PiggyBack: 100 mL    Oral Fluid: 380 mL  Total IN: 480 mL    OUT:    Voided: 800 mL  Total OUT: 800 mL    Total NET: -320 mL      04 Dec 2017 07:01  -  04 Dec 2017 15:20  --------------------------------------------------------  IN:    Oral Fluid: 220 mL  Total IN: 220 mL    OUT:  Total OUT: 0 mL    Total NET: 220 mL            PHYSICAL EXAM:  General: NAD  Respiratory: b/l air entry  Cardiovascular: S1 S2  Gastrointestinal: soft  Extremities: left BKA.           LABORATORY:                        7.5    5.6   )-----------( 141      ( 04 Dec 2017 06:41 )             22.5     12-04    145  |  109<H>  |  59<H>  ----------------------------<  139<H>  4.6   |  23  |  5.01<H>    Ca    8.4      04 Dec 2017 06:41      Sodium, Serum: 145 mmol/L (12-04 @ 06:41)  Sodium, Serum: 145 mmol/L (12-03 @ 06:15)    Potassium, Serum: 4.6 mmol/L (12-04 @ 06:41)  Potassium, Serum: 4.9 mmol/L (12-03 @ 06:15)    Hemoglobin: 7.5 g/dL (12-04 @ 06:41)  Hemoglobin: 7.6 g/dL (12-03 @ 06:15)  Hemoglobin: 7.6 g/dL (12-02 @ 06:58)    Creatinine, Serum 5.01 (12-04 @ 06:41)  Creatinine, Serum 5.14 (12-03 @ 06:15)  Creatinine, Serum 4.98 (12-02 @ 06:58)

## 2017-12-04 NOTE — PROGRESS NOTE ADULT - SUBJECTIVE AND OBJECTIVE BOX
PULMONARY/CRITICAL CARE      INTERVAL HPI/OVERNIGHT EVENTS:  Pt, on NRB, Alert, confused. Denies sob. Putting out urine.  62y MaleHPI:  63 y/o male with PMH of PVD s/p left BKA, ETOH, ? COPD, ? ckd was sent from [facility] for evaluation of low Pulse ox.  Pt had cardiopulmonary arrest in ED due to acute respiratory failure. .Pt is intubated, +coker cath. ? aspiration - empirically on antibiotics. elevated lactate4.8 on admission which is trending down.  High BNP. CXR -right sided. patchy opacification. ECHO- normal as per cardiology. CAT head- no acute event. elevated BNP. (30 Nov 2017 10:32)        PAST MEDICAL & SURGICAL HISTORY:  Peripheral Arterial Disease  Controlled Type 2 Diabetes with Leg or Foot Ulcer  ETOH Abuse  Amputation, Below Knee, Unilateral, Traumatic        ICU Vital Signs Last 24 Hrs  T(C): 36.3 (04 Dec 2017 04:11), Max: 37 (03 Dec 2017 16:04)  T(F): 97.4 (04 Dec 2017 04:11), Max: 98.6 (03 Dec 2017 16:04)  HR: 70 (04 Dec 2017 06:00) (59 - 86)  BP: 151/91 (04 Dec 2017 06:00) (136/76 - 178/68)  BP(mean): 107 (04 Dec 2017 06:00) (89 - 107)  ABP: --  ABP(mean): --  RR: 16 (04 Dec 2017 06:00) (12 - 24)  SpO2: 96% (04 Dec 2017 06:00) (89% - 98%)    Qtts:     I&O's Summary    02 Dec 2017 07:01  -  03 Dec 2017 07:00  --------------------------------------------------------  IN: 510 mL / OUT: 450 mL / NET: 60 mL    03 Dec 2017 07:01  -  04 Dec 2017 06:33  --------------------------------------------------------  IN: 480 mL / OUT: 800 mL / NET: -320 mL            REVIEW OF SYSTEMS:    CONSTITUTIONAL: No fever, weight loss, or fatigue  EYES: No eye pain, visual disturbances, or discharge  ENMT:  No difficulty hearing, tinnitus, vertigo; No sinus or throat pain  NECK: No pain or stiffness  BREASTS: No pain, masses, or nipple discharge  RESPIRATORY: No cough, wheezing, chills or hemoptysis;mild  shortness of breath  CARDIOVASCULAR: No chest pain, palpitations, dizziness, or leg swelling  GASTROINTESTINAL: No abdominal or epigastric pain. No nausea, vomiting, or hematemesis; No diarrhea or constipation. No melena or hematochezia.  GENITOURINARY: No dysuria, frequency, hematuria, or incontinence  NEUROLOGICAL: No headaches, memory loss, loss of strength, numbness, or tremors  SKIN: No itching, burning, rashes, or lesions   LYMPH NODES: No enlarged glands  ENDOCRINE: No heat or cold intolerance; No hair loss  MUSCULOSKELETAL: No joint pain or swelling; No muscle, back, or extremity pain, no calf tenderness  PSYCHIATRIC: No depression, anxiety, mood swings, or difficulty sleeping  HEME/LYMPH: No easy bruising, or bleeding gums  ALLERGY AND IMMUNOLOGIC: No hives or eczema      PHYSICAL EXAM:    GENERAL: , well-developed, NAD  HEAD:  Atraumatic, Normocephalic  EYES: EOMI, PERRLA, conjunctiva and sclera clear  ENMT: No tonsillar erythema, exudates, or enlargement; Moist mucous membranes, Good dentition, No lesions  NECK: Supple, No JVD, Normal thyroid  NERVOUS SYSTEM:  Alert & Oriented X3, Good concentration; Motor Strength 5/5 B/L upper and lower extremities  CHEST/LUNG: Few bas rales, rhonchi, No wheezing, or rubs  Good excursion  HEART: Regular rate and rhythm; No murmurs, rubs, or gallops  ABDOMEN: Soft, Nontender, Nondistended; Bowel sounds present  EXTREMITIES:  2+ Peripheral Pulses, No clubbing, cyanosis, or edema  LYMPH: No lymphadenopathy noted  SKIN: No rashes or lesions        LABS:                        7.6    6.4   )-----------( 121      ( 03 Dec 2017 06:15 )             22.9     12-03    145  |  109<H>  |  63<H>  ----------------------------<  120<H>  4.9   |  23  |  5.14<H>    Ca    8.3<L>      03 Dec 2017 06:15    TPro  5.9<L>  /  Alb  2.5<L>  /  TBili  0.8  /  DBili  x   /  AST  27  /  ALT  20  /  AlkPhos  76  12-02          vanco through     RADIOLOGY & ADDITIONAL STUDIES:      CRITICAL CARE TIME SPENT: PULMONARY/CRITICAL CARE      INTERVAL HPI/OVERNIGHT EVENTS:  Pt, on NRB, Alert, confused. Denies sob. Putting out urine.  62y MaleHPI:  61 y/o male with PMH of PVD s/p left BKA, ETOH, ? COPD, ? ckd was sent from [facility] for evaluation of low Pulse ox.  Pt had cardiopulmonary arrest in ED due to acute respiratory failure. .Pt is intubated, +coker cath. ? aspiration - empirically on antibiotics. elevated lactate4.8 on admission which is trending down.  High BNP. CXR -right sided. patchy opacification. ECHO- normal as per cardiology. CAT head- no acute event. elevated BNP. (30 Nov 2017 10:32)        PAST MEDICAL & SURGICAL HISTORY:  Peripheral Arterial Disease  Controlled Type 2 Diabetes with Leg or Foot Ulcer  ETOH Abuse  Amputation, Below Knee, Unilateral, Traumatic        ICU Vital Signs Last 24 Hrs  T(C): 36.3 (04 Dec 2017 04:11), Max: 37 (03 Dec 2017 16:04)  T(F): 97.4 (04 Dec 2017 04:11), Max: 98.6 (03 Dec 2017 16:04)  HR: 70 (04 Dec 2017 06:00) (59 - 86)  BP: 151/91 (04 Dec 2017 06:00) (136/76 - 178/68)  BP(mean): 107 (04 Dec 2017 06:00) (89 - 107)  ABP: --  ABP(mean): --  RR: 16 (04 Dec 2017 06:00) (12 - 24)  SpO2: 96% (04 Dec 2017 06:00) (89% - 98%)    Qtts:     I&O's Summary    02 Dec 2017 07:01  -  03 Dec 2017 07:00  --------------------------------------------------------  IN: 510 mL / OUT: 450 mL / NET: 60 mL    03 Dec 2017 07:01  -  04 Dec 2017 06:33  --------------------------------------------------------  IN: 480 mL / OUT: 800 mL / NET: -320 mL            REVIEW OF SYSTEMS:    CONSTITUTIONAL: No fever, weight loss, or fatigue  EYES: No eye pain, visual disturbances, or discharge  ENMT:  No difficulty hearing, tinnitus, vertigo; No sinus or throat pain  NECK: No pain or stiffness  BREASTS: No pain, masses, or nipple discharge  RESPIRATORY: No cough, wheezing, chills or hemoptysis;mild  shortness of breath  CARDIOVASCULAR: No chest pain, palpitations, dizziness, or leg swelling  GASTROINTESTINAL: No abdominal or epigastric pain. No nausea, vomiting, or hematemesis; No diarrhea or constipation. No melena or hematochezia.  GENITOURINARY: No dysuria, frequency, hematuria, or incontinence  NEUROLOGICAL: No headaches, memory loss, loss of strength, numbness, or tremors  SKIN: No itching, burning, rashes, or lesions   LYMPH NODES: No enlarged glands  ENDOCRINE: No heat or cold intolerance; No hair loss  MUSCULOSKELETAL: No joint pain or swelling; No muscle, back, or extremity pain, no calf tenderness  PSYCHIATRIC: No depression, anxiety, mood swings, or difficulty sleeping  HEME/LYMPH: No easy bruising, or bleeding gums  ALLERGY AND IMMUNOLOGIC: No hives or eczema      PHYSICAL EXAM:    GENERAL: , well-developed, NAD  HEAD:  Atraumatic, Normocephalic  EYES: EOMI, PERRLA, conjunctiva and sclera clear  ENMT: No tonsillar erythema, exudates, or enlargement; Moist mucous membranes, Good dentition, No lesions  NECK: Supple, No JVD, Normal thyroid  NERVOUS SYSTEM:  Alert & Oriented X3, Good concentration; Motor Strength 5/5 B/L upper and lower extremities  CHEST/LUNG: Few bas rales, rhonchi, No wheezing, or rubs  Good excursion  HEART: Regular rate and rhythm; No murmurs, rubs, or gallops  ABDOMEN: Soft, Nontender, Nondistended; Bowel sounds present  EXTREMITIES:  2+ Peripheral Pulses, No clubbing, cyanosis, or edema : left bka  LYMPH: No lymphadenopathy noted  SKIN: No rashes or lesions        LABS:                        7.6    6.4   )-----------( 121      ( 03 Dec 2017 06:15 )             22.9     12-03    145  |  109<H>  |  63<H>  ----------------------------<  120<H>  4.9   |  23  |  5.14<H>    Ca    8.3<L>      03 Dec 2017 06:15    TPro  5.9<L>  /  Alb  2.5<L>  /  TBili  0.8  /  DBili  x   /  AST  27  /  ALT  20  /  AlkPhos  76  12-02          vanco through     RADIOLOGY & ADDITIONAL STUDIES:      CRITICAL CARE TIME SPENT:

## 2017-12-04 NOTE — PROGRESS NOTE ADULT - SUBJECTIVE AND OBJECTIVE BOX
JP SANDOVAL is a 62yMale , patient examined and chart reviewed. Patient being followed for possible pneumonia and hypoxic respiratory failure     INTERVAL HPI/ OVERNIGHT EVENTS: events noted  , remains on BiPAP , scheduled for possible thoracocentesis or bronchoscopy as per RN       Past Medical History--  PAST MEDICAL & SURGICAL HISTORY:  Peripheral Arterial Disease  Controlled Type 2 Diabetes with Leg or Foot Ulcer  ETOH Abuse  Amputation, Below Knee, Unilateral, Traumatic      For details regarding the patient's social history, family history, and other miscellaneous elements, please refer the initial infectious diseases consultation and/or the admitting history and physical examination for this admission.      ROS:  CONSTITUTIONAL:  Negative fever or chills, feels well, good appetite  EYES:  Negative  blurry vision or double vision  CARDIOVASCULAR:  Negative for chest pain or palpitations  RESPIRATORY:  Positive for cough and  SOB   GASTROINTESTINAL:  Negative for nausea, vomiting, diarrhea, constipation, or abdominal pain  GENITOURINARY:  Negative frequency, urgency , dysuria or hematuria   NEUROLOGIC:  No headache, confusion, dizziness, lightheadedness  All other systems were reviewed and are negative     Allergies:      Current inpatient medications :    ANTIBIOTICS/RELEVANT:  piperacillin/tazobactam IVPB. 3.375 Gram(s) IV Intermittent every 12 hours      ALBUTerol/ipratropium for Nebulization 3 milliLiter(s) Nebulizer every 6 hours  amLODIPine   Tablet 5 milliGRAM(s) Oral daily  buDESOnide 160 MICROgram(s)/formoterol 4.5 MICROgram(s) Inhaler 2 Puff(s) Inhalation two times a day  dextrose 5%. 1000 milliLiter(s) IV Continuous <Continuous>  dextrose 50% Injectable 12.5 Gram(s) IV Push once  dextrose 50% Injectable 25 Gram(s) IV Push once  dextrose 50% Injectable 25 Gram(s) IV Push once  dextrose Gel 1 Dose(s) Oral once PRN  glucagon  Injectable 1 milliGRAM(s) IntraMuscular once PRN  heparin  Injectable 5000 Unit(s) SubCutaneous every 8 hours  insulin lispro (HumaLOG) corrective regimen sliding scale   SubCutaneous Before meals and at bedtime  metoprolol     tartrate 25 milliGRAM(s) Oral every 8 hours  pantoprazole  Injectable 40 milliGRAM(s) IV Push daily      Objective:    12-03 @ 07:01  -  12-04 @ 07:00  --------------------------------------------------------  IN: 480 mL / OUT: 800 mL / NET: -320 mL      T(C): 36.4 (12-04-17 @ 08:10), Max: 37 (12-03-17 @ 16:04)  HR: 56 (12-04-17 @ 08:00) (56 - 86)  BP: 160/99 (12-04-17 @ 08:00) (136/76 - 178/68)  RR: 15 (12-04-17 @ 08:00) (12 - 24)  SpO2: 95% (12-04-17 @ 08:00) (89% - 98%)  Wt(kg): --      Physical Exam:  GEN: NAD, pleasant  HEENT: normocephalic and atraumatic. EOMI. [place]. Moist mucosa. Clear Posterior pharynx.  NECK: Supple. No carotid bruits.  No lymphadenopathy or thyromegaly.  LUNGS: decreased breath sounds on  auscultation.  HEART: Regular rate and rhythm without murmur.  ABDOMEN: Soft, nontender, and nondistended.  Positive bowel sounds.  No hepatosplenomegaly was noted.  EXTREMITIES: Without any cyanosis, clubbing, rash, lesions or edema.  NEUROLOGIC: A & O x3, No focal neurological deficits   SKIN: No ulceration or induration present.      LABS:                        7.5    5.6   )-----------( 141      ( 04 Dec 2017 06:41 )             22.5       12-04    145  |  109<H>  |  59<H>  ----------------------------<  139<H>  4.6   |  23  |  5.01<H>    Ca    8.4      04 Dec 2017 06:41                   RECENT CULTURES:          RADIOLOGY & ADDITIONAL STUDIES:  US Chest (12.03.17 @ 10:22) >  Targeted ultrasound examination of the right left hemithorax is performed   to evaluate for pleural effusion.    There is a free-flowing right-sided pleural effusion with estimated   volume by sonographic measurements of 374 cc.  There is a free-flowing left-sided pleural effusion, with estimated   volume by sonographic measurements of250 cc.    Impression:    Bilateral pleural effusions as discussed.       Assessment :  62 year old male hx of ? COPD, ETOH abuse, PVD, s/p right BKA , ? CKD vs EDWIN admitted with acuter cardiorespiratory arrest , primarily respiratory failure leading to cardiac arrest , has unequal pupils etiology is unclear, as Ct head is negative ,  seen by neurology  .    CXR shows extensive right sided infiltrates , has elevated lactate and severe metabolic acidosis    Ct chest reveals bilateral effusions with bilateral lower lobe consolidations ? aspiration vs fluid overload . Doubt he has worsening pneumonia without any significant productive cough, fever or leukocytosis    Plan :   - continue with IV Zosyn for now, complete total 7 days   - consider diuretics and dc IVF   - OOB , PT evaluation   - consider thoracocentesis to see if pleural effusion is exudate , will discuss with pulmonary   - serial CXR   - may need HD if no improvement in CXR    I have discussed the above plan of care with patient's family in detail. They expressed understanding of the the treatment plan . Risks, benefits and alternatives discussed in detail. I have asked if they have any questions or concerns and appropriately addressed them to the best of my ability .      Critical care time greater then 35 minutes reviewing notes, labs data/ imaging , discussion with multidisciplinary team.    Thank you for allowing me to participate in care of your patient .        Gildardo Daley MD  [phone]

## 2017-12-04 NOTE — PROGRESS NOTE ADULT - SUBJECTIVE AND OBJECTIVE BOX
62y  Male  No Known Allergies       CC: Patient is a 62y old  Male who presents with a chief complaint of c/o SOB, respiratory distress, s/p cardiopulmonary arrest     HPI:  61 y/o male with PMH of PVD s/p left BKA, ETOH,  COPD,  was sent from [facility] for evaluation of low Pulse ox. He went into cardiopulmonary arrest in ED due to acute hypoxic  respiratory failure. .There is qustion of aspiration preceding the event as cause for initial hypoxia and placed empirically on antibiotics. although the BNP was elevated and acute CHF exacerbation cannot be ruled out as cause. The chest xray showed patchy opacification on the right, TTE showed minimal mitral prolapse but otherwise was normal, post arrest CT brain showed no acute events. Patient still remains in slight respiratory distress tonight with occasional desaturation into the 80s, hes been  on and off BIPAP today currently stable on 40% venti mask.        PAST MEDICAL & SURGICAL HISTORY:  Peripheral Arterial Disease  Controlled Type 2 Diabetes with Leg or Foot Ulcer  ETOH Abuse  Amputation, Below Knee, Unilateral, Traumatic    FAMILY HISTORY:      Vitals   Vital Signs Last 24 Hrs  T(C): 36.8 (04 Dec 2017 20:01), Max: 36.9 (04 Dec 2017 16:07)  T(F): 98.2 (04 Dec 2017 20:01), Max: 98.4 (04 Dec 2017 16:07)  HR: 60 (04 Dec 2017 21:02) (51 - 86)  BP: 168/66 (04 Dec 2017 20:00) (135/96 - 173/79)  BP(mean): 92 (04 Dec 2017 20:00) (78 - 117)  RR: 13 (04 Dec 2017 20:00) (12 - 24)  SpO2: 99% (04 Dec 2017 21:02) (92% - 99%)    I&O's Summary    03 Dec 2017 07:01  -  04 Dec 2017 07:00  --------------------------------------------------------  IN: 480 mL / OUT: 800 mL / NET: -320 mL    04 Dec 2017 07:01  -  04 Dec 2017 21:25  --------------------------------------------------------  IN: 320 mL / OUT: 600 mL / NET: -280 mL        LABS  12-04    145  |  109<H>  |  59<H>  ----------------------------<  139<H>  4.6   |  23  |  5.01<H>    Ca    8.4      04 Dec 2017 06:41                            7.5    5.6   )-----------( 141      ( 04 Dec 2017 06:41 )             22.5             CAPILLARY BLOOD GLUCOSE      POCT Blood Glucose.: 128 mg/dL (04 Dec 2017 15:57)        VENT SETTINGS       Meds  MEDICATIONS  (STANDING):  ALBUTerol/ipratropium for Nebulization 3 milliLiter(s) Nebulizer every 6 hours  amLODIPine   Tablet 5 milliGRAM(s) Oral daily  buDESOnide 160 MICROgram(s)/formoterol 4.5 MICROgram(s) Inhaler 2 Puff(s) Inhalation two times a day  dextrose 5%. 1000 milliLiter(s) (50 mL/Hr) IV Continuous <Continuous>  dextrose 50% Injectable 12.5 Gram(s) IV Push once  dextrose 50% Injectable 25 Gram(s) IV Push once  dextrose 50% Injectable 25 Gram(s) IV Push once  epoetin flower Injectable 40112 Unit(s) SubCutaneous <User Schedule>  furosemide   Injectable 60 milliGRAM(s) IV Push every 12 hours  heparin  Injectable 5000 Unit(s) SubCutaneous every 8 hours  influenza   Vaccine 0.5 milliLiter(s) IntraMuscular once  insulin lispro (HumaLOG) corrective regimen sliding scale   SubCutaneous Before meals and at bedtime  metoprolol     tartrate 25 milliGRAM(s) Oral every 8 hours  pantoprazole  Injectable 40 milliGRAM(s) IV Push daily  piperacillin/tazobactam IVPB. 3.375 Gram(s) IV Intermittent every 12 hours      REVIEW OF SYSTEMS:    CONSTITUTIONAL: No fever, weight loss, or fatigue  EYES: No eye pain, visual disturbances, or discharge  ENMT:  No difficulty hearing, tinnitus, vertigo; No sinus or throat pain  NECK: No pain or stiffness  BREASTS: No pain, masses, or nipple discharge  RESPIRATORY: No cough, wheezing, chills or hemoptysis; slight shortness of breath  CARDIOVASCULAR: No chest pain, palpitations, dizziness, or leg swelling  GASTROINTESTINAL: No abdominal or epigastric pain. No nausea, vomiting, or hematemesis; No diarrhea or constipation. No melena or hematochezia.  GENITOURINARY: No dysuria, frequency, hematuria, or incontinence  NEUROLOGICAL: No headaches, memory loss, loss of strength, numbness, or tremors  SKIN: No itching, burning, rashes, or lesions   LYMPH NODES: No enlarged glands  ENDOCRINE: No heat or cold intolerance; No hair loss  MUSCULOSKELETAL: No joint pain or swelling; No muscle, back, or extremity pain  PSYCHIATRIC: No depression, anxiety, mood swings, or difficulty sleeping  HEME/LYMPH: No easy bruising, or bleeding gums  ALLERY AND IMMUNOLOGIC: No hives or eczema      Physicial Exam:     Constitutional: NAD, well-groomed, well-developed  HEENT: PERRLA, EOMI, no drainage or redness  Neck: No bruits; no thyromegaly or nodules,  No JVD  Back: Normal spine flexure, No CVA tenderness, No deformity or limitation of movement  Respiratory: Breath Sounds equal & clear to percussion & auscultation, slight accessory muscle use with SOB but stable at this time no need for BIPAP  Cardiovascular: Regular rate & rhythm, normal S1, S2; no murmurs, gallops or rubs; no S3, S4  Gastrointestinal: Soft, non-tender, non distended no hepatosplenomegaly, normal bowel sounds  Extremities: No peripheral edema, No cyanosis, clubbing   Vascular: Equal and normal pulses: 2+ peripheral pulses throughout  Neurological: GCS:    A&O x 3; no sensory, motor  deficits, normal reflexes  Psychiatric: Normal mood, normal affect  Musculoskeletal: No joint pain, swelling or deformity; no limitation of movement  Skin: No rashes

## 2017-12-04 NOTE — PROGRESS NOTE ADULT - PROBLEM SELECTOR PLAN 1
Continue to observe on NIV. May transition to high flow NC due to patient agitation   -HOB 30 degrees   -aggressive chest PT and suctioning  DNR.

## 2017-12-04 NOTE — PROGRESS NOTE ADULT - ASSESSMENT
PROBLEM LIST:  Anemia: Anemia  EDWIN (acute kidney injury): EDWIN (acute kidney injury)  Chronic obstructive pulmonary disease, unspecified COPD type: Chronic obstructive pulmonary disease, unspecified COPD type  Aspiration pneumonia due to gastric secretions, unspecified laterality, unspecified part of lung: Aspiration pneumonia due to gastric secretions, unspecified laterality, unspecified part of lung  CKD (chronic kidney disease) stage 5, GFR less than 15 ml/min: CKD (chronic kidney disease) stage 5, GFR less than 15 ml/min  Pupil asymmetry: Pupil asymmetry  Peripheral Arterial Disease: Peripheral Arterial Disease  ETOH Abuse: ETOH Abuse  Aspiration into airway, initial encounter: Aspiration into airway, initial encounter  Chronic obstructive pulmonary disease with acute exacerbation: Chronic obstructive pulmonary disease with acute exacerbation  Type 2 diabetes mellitus with diabetic chronic kidney disease, unspecified CKD stage, unspecified long term insulin use status: Type 2 diabetes mellitus with diabetic chronic kidney disease, unspecified CKD stage, unspecified long term insulin use status  Cardiopulmonary arrest with successful resuscitation: Cardiopulmonary arrest with successful resuscitation  Acute respiratory failure with hypoxia: Acute respiratory failure with hypoxia  Renal failure, unspecified chronicity: Renal failure, unspecified chronicity  Aortic stenosis, mild: Aortic stenosis, mild  Cardiac arrest: Cardiac arrest  Aspiration into airway: Aspiration into airway  COPD (chronic obstructive pulmonary disease): COPD (chronic obstructive pulmonary disease)  Congestive heart failure (CHF): Congestive heart failure (CHF)  Respiratory failure: Respiratory failure      Assessment     ·  Problem: Cardiac arrest.     S/p cardiac arrest secondary to hypoxic respiratory failure;   hemodynamically stable and without arrhythmias on telemetry with normal LV function;   continue to optimize pulmonary status with treatment of PNA.     ·  Problem: Aortic stenosis, mild.        ·  Problem: Renal failure, unspecified chronicity.    No hydronephrosis; acute on chronic renal failure per nephrology.   Dialysis being contemplated.       ·  Problem: Anemia.  Plan: likely related to renal failure.    HGB down to 7.5   heme consult.     Attending Attestation:   Plan discussed with pt. and staff.

## 2017-12-04 NOTE — PROGRESS NOTE ADULT - PROBLEM SELECTOR PLAN 1
Patient currently on 40% face mask will asses need for BIPAP   -ABG   -HOB 30 degrees   -aggressive chest PT and suctioning   -daily off BIAP trial if clinical condition warrants, discuss with respiratory therapy  - consider need to continue diuresis.

## 2017-12-04 NOTE — PROGRESS NOTE ADULT - ASSESSMENT
62 year old male s/p cardiopulmonary arrest extubated now with acute hypoxic respiratory failure secondary to aspiration PNA intermittently requiirng NIV in form of BIPAP    Time spent: 30 mins assessing presenting problems with high probability of end organ damage/dysfunction.  Medical decision making inculding plan of daily care, reviewing data, reviewing radiology, discussing with multidisciplinary team, non inclusive of procedures, discussing goals of care with patient/family

## 2017-12-04 NOTE — PROGRESS NOTE ADULT - ASSESSMENT
63 y/o male with PMH of PVD s/p left BKA, ETOH, ? COPD, ? ckd was sent from [facility] for evaluation of low Pulse ox.  Pt had cardiopulmonary arrest in ED due to acute respiratory failure. .Pt was intubated, now extubated.

## 2017-12-04 NOTE — PROGRESS NOTE ADULT - PROBLEM SELECTOR PLAN 1
stable , now s/p extubation.   c/w  [facility] level of care .  c/w iv zosyn for pneumonia.  keep sats>90%.  further care per PCCM

## 2017-12-04 NOTE — PROGRESS NOTE ADULT - SUBJECTIVE AND OBJECTIVE BOX
REASON FOR VISIT: S/p cardiac arrest    HPI: 62 year old man with a history of chronic illness, severe PVD s/p left BKA, anemia, peripheral neuropathy, uncontrolled DM, etOH abuse transferred from his long term care facility for the evaluation of hypoxia; now s/p asystolic arrest in ED due to hypoxic respiratory failure; also with renal failure.    12/1/17:  Sedated on vent; more responsive per RN.  12/2/17: extubated and breathing better but still SOB and sating in high 80's to low 90's.  12/3/17: resting and sleeping comfortably, Sat in 92% range.    12/4/17 sedated,sleeping  no new events    MEDICATIONS  (STANDING):  ALBUTerol    0.083% 2.5 milliGRAM(s) Nebulizer every 8 hours  amLODIPine   Tablet 5 milliGRAM(s) Oral daily  chlorhexidine 0.12% Liquid 15 milliLiter(s) Swish and Spit two times a day  dextrose 5%. 1000 milliLiter(s) (50 mL/Hr) IV Continuous <Continuous>  dextrose 50% Injectable 12.5 Gram(s) IV Push once  dextrose 50% Injectable 25 Gram(s) IV Push once  dextrose 50% Injectable 25 Gram(s) IV Push once  heparin  Injectable 5000 Unit(s) SubCutaneous every 8 hours  influenza   Vaccine 0.5 milliLiter(s) IntraMuscular once  insulin lispro (HumaLOG) corrective regimen sliding scale   SubCutaneous Before meals and at bedtime  metoprolol     tartrate 25 milliGRAM(s) Oral every 8 hours  pantoprazole  Injectable 40 milliGRAM(s) IV Push daily  piperacillin/tazobactam IVPB. 3.375 Gram(s) IV Intermittent every 12 hours    MEDICATIONS  (PRN):  dextrose Gel 1 Dose(s) Oral once PRN Blood Glucose LESS THAN 70 milliGRAM(s)/deciliter  glucagon  Injectable 1 milliGRAM(s) IntraMuscular once PRN Glucose LESS THAN 70 milligrams/deciliter      Vital Signs Last 24 HrsICU Vital Signs Last 24 Hrs  T(C): 36.5 (04 Dec 2017 12:07), Max: 37 (03 Dec 2017 16:04)  T(F): 97.7 (04 Dec 2017 12:07), Max: 98.6 (03 Dec 2017 16:04)  HR: 55 (04 Dec 2017 11:15) (54 - 86)  BP: 135/96 (04 Dec 2017 10:00) (135/96 - 178/68)  BP(mean): 101 (04 Dec 2017 10:00) (89 - 117)  ABP: --  ABP(mean): --  RR: 16 (04 Dec 2017 10:00) (12 - 24)  SpO2: 95% (04 Dec 2017 11:15) (89% - 98%)          PHYSICAL EXAM:  Constitutional: awake and in no distress but mildly tachypneic  Pulmonary: decreased breath sounds (R>L), scant ronchi bilaterally  Cardiovascular: S1 and S2, mild bradycardia, 2/6 ROSARIO LUSB  Gastrointestinal: Abdomen is soft.   Lymph: No pedal edema. S/p BKA  Skin: No rash.    LABS:            CBC Full  -  ( 04 Dec 2017 06:41 )  WBC Count : 5.6 K/uL  Hemoglobin : 7.5 g/dL  Hematocrit : 22.5 %  Platelet Count - Automated : 141 K/uL      12-04    145  |  109<H>  |  59<H>  ----------------------------<  139<H>  4.6   |  23  |  5.01<H>    Ca    8.4      04 Dec 2017 06:41              I&O's Summary    03 Dec 2017 07:01  -  04 Dec 2017 07:00  --------------------------------------------------------  IN: 480 mL / OUT: 800 mL / NET: -320 mL        CAPILLARY BLOOD GLUCOSE      POCT Blood Glucose.: 170 mg/dL (04 Dec 2017 10:58)  POCT Blood Glucose.: 131 mg/dL (04 Dec 2017 07:39)  POCT Blood Glucose.: 163 mg/dL (03 Dec 2017 21:52)  POCT Blood Glucose.: 171 mg/dL (03 Dec 2017 16:45)                                        7.6    6.4   )-----------( 121      ( 03 Dec 2017 06:15 )             22.9     12-03    145  |  109<H>  |  63<H>  ----------------------------<  120<H>  4.9   |  23  |  5.14<H>    Ca    8.3<L>      03 Dec 2017 06:15    TPro  5.9<L>  /  Alb  2.5<L>  /  TBili  0.8  /  DBili  x   /  AST  27  /  ALT  20  /  AlkPhos  76  12-02        LIVER FUNCTIONS - ( 02 Dec 2017 06:58 )  Alb: 2.5 g/dL / Pro: 5.9 g/dL / ALK PHOS: 76 U/L / ALT: 20 U/L DA / AST: 27 U/L / GGT: x           11-30 @ 05:40 Pro Bnp [id number]    ECG (11/30 @ 6:03): Sinus bradycardia 59 bpm, low QRS voltage in limb leads, possible inferior infarct (old), mildly prolonged QTc    CT Chest No Cont (11.30.17 @ 08:25): Small bilateral pleural effusions.  Bilateral airspace disease present. Airspace disease present in the right upper lobe right middle lobe and right lower lobe. Airspace disease visible in the left lower lobe. No mediastinal lesions evident. Hilar regions obscured by bilateral lung pathology. Cardiomegaly and coronary artery calcifications present. No evidence of pericardial effusion. No evidence of thoracic aortic aneurysm. The central airway is intact. An endotracheal tube is present. No adrenal  lesions. The spleen is not enlarged. No acute osseous abnormalities.    US Transthoracic Echocardiogram w/Doppler Complete (11.30.17 @ 08:14) >  1. Normal left ventricular size and function. LVEF estimated at 65-70%.  2. Normal right ventricular size and function.  3. Minimal prolapse of the anterior mitral leaflet. Mild mitral stenosis.  Minimal mitral regurgitation.  4. Mild aortic stenosis.  5. Mild tricuspid regurgitation.     Tele: Sinus rhythm

## 2017-12-05 LAB
ALBUMIN SERPL ELPH-MCNC: 2.7 G/DL — LOW (ref 3.3–5)
ALP SERPL-CCNC: 85 U/L — SIGNIFICANT CHANGE UP (ref 30–120)
ALT FLD-CCNC: 16 U/L DA — SIGNIFICANT CHANGE UP (ref 10–60)
ANION GAP SERPL CALC-SCNC: 17 MMOL/L — SIGNIFICANT CHANGE UP (ref 5–17)
AST SERPL-CCNC: 15 U/L — SIGNIFICANT CHANGE UP (ref 10–40)
BILIRUB SERPL-MCNC: 0.5 MG/DL — SIGNIFICANT CHANGE UP (ref 0.2–1.2)
BUN SERPL-MCNC: 62 MG/DL — HIGH (ref 7–23)
CALCIUM SERPL-MCNC: 9 MG/DL — SIGNIFICANT CHANGE UP (ref 8.4–10.5)
CHLORIDE SERPL-SCNC: 108 MMOL/L — SIGNIFICANT CHANGE UP (ref 96–108)
CO2 SERPL-SCNC: 22 MMOL/L — SIGNIFICANT CHANGE UP (ref 22–31)
CREAT SERPL-MCNC: 5.18 MG/DL — HIGH (ref 0.5–1.3)
CULTURE RESULTS: SIGNIFICANT CHANGE UP
CULTURE RESULTS: SIGNIFICANT CHANGE UP
FOLATE SERPL-MCNC: 11.9 NG/ML — SIGNIFICANT CHANGE UP (ref 4.8–24.2)
GLUCOSE BLDC GLUCOMTR-MCNC: 115 MG/DL — HIGH (ref 70–99)
GLUCOSE BLDC GLUCOMTR-MCNC: 168 MG/DL — HIGH (ref 70–99)
GLUCOSE BLDC GLUCOMTR-MCNC: 183 MG/DL — HIGH (ref 70–99)
GLUCOSE BLDC GLUCOMTR-MCNC: 229 MG/DL — HIGH (ref 70–99)
GLUCOSE SERPL-MCNC: 113 MG/DL — HIGH (ref 70–99)
HCT VFR BLD CALC: 27.2 % — LOW (ref 39–50)
HGB BLD-MCNC: 8.4 G/DL — LOW (ref 13–17)
IRON SATN MFR SERPL: 48 UG/DL — SIGNIFICANT CHANGE UP (ref 45–165)
MCHC RBC-ENTMCNC: 29.4 PG — SIGNIFICANT CHANGE UP (ref 27–34)
MCHC RBC-ENTMCNC: 31 GM/DL — LOW (ref 32–36)
MCV RBC AUTO: 94.9 FL — SIGNIFICANT CHANGE UP (ref 80–100)
PLATELET # BLD AUTO: 172 K/UL — SIGNIFICANT CHANGE UP (ref 150–400)
POTASSIUM SERPL-MCNC: 4.8 MMOL/L — SIGNIFICANT CHANGE UP (ref 3.5–5.3)
POTASSIUM SERPL-SCNC: 4.8 MMOL/L — SIGNIFICANT CHANGE UP (ref 3.5–5.3)
PROT SERPL-MCNC: 7.1 G/DL — SIGNIFICANT CHANGE UP (ref 6–8.3)
RBC # BLD: 2.78 M/UL — LOW (ref 4.2–5.8)
RBC # BLD: 2.87 M/UL — LOW (ref 4.2–5.8)
RBC # FLD: 14.5 % — SIGNIFICANT CHANGE UP (ref 10.3–14.5)
RETICS #: 50.6 K/UL — SIGNIFICANT CHANGE UP (ref 25–125)
RETICS/RBC NFR: 1.8 % — SIGNIFICANT CHANGE UP (ref 0.5–2.5)
SODIUM SERPL-SCNC: 147 MMOL/L — HIGH (ref 135–145)
SPECIMEN SOURCE: SIGNIFICANT CHANGE UP
SPECIMEN SOURCE: SIGNIFICANT CHANGE UP
VIT B12 SERPL-MCNC: 1711 PG/ML — HIGH (ref 243–894)
WBC # BLD: 5.5 K/UL — SIGNIFICANT CHANGE UP (ref 3.8–10.5)
WBC # FLD AUTO: 5.5 K/UL — SIGNIFICANT CHANGE UP (ref 3.8–10.5)

## 2017-12-05 PROCEDURE — 99233 SBSQ HOSP IP/OBS HIGH 50: CPT

## 2017-12-05 PROCEDURE — 99232 SBSQ HOSP IP/OBS MODERATE 35: CPT

## 2017-12-05 PROCEDURE — 71010: CPT | Mod: 26

## 2017-12-05 RX ORDER — FERROUS SULFATE 325(65) MG
325 TABLET ORAL DAILY
Qty: 0 | Refills: 0 | Status: DISCONTINUED | OUTPATIENT
Start: 2017-12-05 | End: 2017-12-08

## 2017-12-05 RX ORDER — FUROSEMIDE 40 MG
40 TABLET ORAL DAILY
Qty: 0 | Refills: 0 | Status: DISCONTINUED | OUTPATIENT
Start: 2017-12-06 | End: 2017-12-06

## 2017-12-05 RX ADMIN — HEPARIN SODIUM 5000 UNIT(S): 5000 INJECTION INTRAVENOUS; SUBCUTANEOUS at 05:24

## 2017-12-05 RX ADMIN — HEPARIN SODIUM 5000 UNIT(S): 5000 INJECTION INTRAVENOUS; SUBCUTANEOUS at 21:36

## 2017-12-05 RX ADMIN — BUDESONIDE AND FORMOTEROL FUMARATE DIHYDRATE 2 PUFF(S): 160; 4.5 AEROSOL RESPIRATORY (INHALATION) at 22:23

## 2017-12-05 RX ADMIN — Medication 60 MILLIGRAM(S): at 05:25

## 2017-12-05 RX ADMIN — Medication 3 MILLILITER(S): at 07:50

## 2017-12-05 RX ADMIN — Medication 25 MILLIGRAM(S): at 13:18

## 2017-12-05 RX ADMIN — PIPERACILLIN AND TAZOBACTAM 25 GRAM(S): 4; .5 INJECTION, POWDER, LYOPHILIZED, FOR SOLUTION INTRAVENOUS at 05:25

## 2017-12-05 RX ADMIN — Medication 25 MILLIGRAM(S): at 05:24

## 2017-12-05 RX ADMIN — Medication 1: at 21:36

## 2017-12-05 RX ADMIN — Medication 3 MILLILITER(S): at 20:13

## 2017-12-05 RX ADMIN — Medication 2: at 16:08

## 2017-12-05 RX ADMIN — Medication 3 MILLILITER(S): at 13:38

## 2017-12-05 RX ADMIN — Medication 1: at 11:16

## 2017-12-05 RX ADMIN — PANTOPRAZOLE SODIUM 40 MILLIGRAM(S): 20 TABLET, DELAYED RELEASE ORAL at 11:12

## 2017-12-05 RX ADMIN — Medication 25 MILLIGRAM(S): at 21:36

## 2017-12-05 RX ADMIN — AMLODIPINE BESYLATE 5 MILLIGRAM(S): 2.5 TABLET ORAL at 05:24

## 2017-12-05 RX ADMIN — Medication 325 MILLIGRAM(S): at 16:45

## 2017-12-05 RX ADMIN — PIPERACILLIN AND TAZOBACTAM 25 GRAM(S): 4; .5 INJECTION, POWDER, LYOPHILIZED, FOR SOLUTION INTRAVENOUS at 17:03

## 2017-12-05 RX ADMIN — HEPARIN SODIUM 5000 UNIT(S): 5000 INJECTION INTRAVENOUS; SUBCUTANEOUS at 13:18

## 2017-12-05 NOTE — CONSULT NOTE ADULT - SUBJECTIVE AND OBJECTIVE BOX
Hematology/Oncology consult     Patient is seen and examined  notes/labs reviewed  pt family is at bedside and d/w family.  consult dictated,  Job #    contact #  son/daughter----  phone #    IMPRESSION:      RECOMMENDATION:              ,thanks for courtsey of this consult,will follow this pt with you for hem/onc issue while pt is in hospital. Hematology/Oncology consult     Patient is seen and examined  notes/labs reviewed  pt family is at bedside and d/w family.  consult dictated,  Job # [id number]    contact #  sister----anel torres  phone # [phone], message left with call back number left    IMPRESSION:  anemia  ckd  low iron, was on po iron out pt  pt declines bm bx/refuses repeat ct a/p,radiology scan, refuses gi wval,gi w/u  pt is on epogen 10k s/c tiw as per renal    RECOMMENDATION:  occult blood  spep  retic  fe 325 mg qd--30 days  on epogen per renal--m/w/f  monitor h/h--transfuse prbc as needed for symptomatic anemia  message left to sister anel at [phone]  gi/dvtp--heparin s/c  Dr Gautam,thanks for courtesy of this consult, will follow this pt with you for hem/onc issue while pt is in hospital.    addendum---  d/w sister  sister--ms--  sister--kidney cancer-- age 55  brother--mi  etoh use  at [facility] for past 10 yrs  s/p [facility] admission about 2 weeks ago for renal failure

## 2017-12-05 NOTE — CHART NOTE - NSCHARTNOTEFT_GEN_A_CORE
Assessment: Pt appears much more alert today. Pt remains on Con CHO, low na, mech soft consistency diet. Appears to be tolerating well. Pt reports he "never had a great appetite" but knows the importance to eat at mealtimes and is mindful to eat (usual po intake once extubated is fair, varies 50-80%). Remains on O2, discussed importance of modified diet consistency. Per renal, no indication for HD at this time, although bun/creat remain elevated. Skin intact. Possible d/c to [facility] noted tomorrow.     Factors impacting intake: [ ] none [ ] nausea  [ ] vomiting [ ] diarrhea [ ] constipation  [ ]chewing problems [ ] swallowing issues  [ ] other:     Diet Presciption: Diet, Mechanical Soft:   Consistent Carbohydrate {Evening Snack}  Low Sodium (12-01-17 @ 12:21)    Intake: varies 50-80% per documentation    Current Weight: Weight (kg): 63.5 (11-30 @ 08:36)  % Weight Change 12/4 133.6#, 12/3 133.1#, 12/2 140.2#, 12/1 141.3#- wt's vary t/o adm, ? accuracy as wt loss/gain unlikely (pt appears closer ~140#, denies wt changes pta)    Pertinent Medications: MEDICATIONS  (STANDING):  ALBUTerol/ipratropium for Nebulization 3 milliLiter(s) Nebulizer every 6 hours  amLODIPine   Tablet 5 milliGRAM(s) Oral daily  buDESOnide 160 MICROgram(s)/formoterol 4.5 MICROgram(s) Inhaler 2 Puff(s) Inhalation two times a day  dextrose 5%. 1000 milliLiter(s) (50 mL/Hr) IV Continuous <Continuous>  dextrose 50% Injectable 12.5 Gram(s) IV Push once  dextrose 50% Injectable 25 Gram(s) IV Push once  dextrose 50% Injectable 25 Gram(s) IV Push once  epoetin flower Injectable 62666 Unit(s) SubCutaneous <User Schedule>  ferrous    sulfate 325 milliGRAM(s) Oral daily  heparin  Injectable 5000 Unit(s) SubCutaneous every 8 hours  influenza   Vaccine 0.5 milliLiter(s) IntraMuscular once  insulin lispro (HumaLOG) corrective regimen sliding scale   SubCutaneous Before meals and at bedtime  metoprolol     tartrate 25 milliGRAM(s) Oral every 8 hours  pantoprazole  Injectable 40 milliGRAM(s) IV Push daily  piperacillin/tazobactam IVPB. 3.375 Gram(s) IV Intermittent every 12 hours    MEDICATIONS  (PRN):  dextrose Gel 1 Dose(s) Oral once PRN Blood Glucose LESS THAN 70 milliGRAM(s)/deciliter  glucagon  Injectable 1 milliGRAM(s) IntraMuscular once PRN Glucose LESS THAN 70 milligrams/deciliter    Pertinent Labs: 12-05 Na147 mmol/L<H> Glu 113 mg/dL<H> K+ 4.8 mmol/L Cr  5.18 mg/dL<H> BUN 62 mg/dL<H> Phos n/a   Alb 2.7 g/dL<L> PAB n/a        CAPILLARY BLOOD GLUCOSE      POCT Blood Glucose.: 168 mg/dL (05 Dec 2017 11:14)  POCT Blood Glucose.: 115 mg/dL (05 Dec 2017 07:23)  POCT Blood Glucose.: 139 mg/dL (04 Dec 2017 22:07)    Skin: intact, landy 15    Estimated Needs:   [ x] no change since previous assessment  [ ] recalculated:     Previous Nutrition Diagnosis:   [ ] Inadequate Energy Intake [ ]Inadequate Oral Intake [ ] Excessive Energy Intake   [ ] Underweight [ ] Increased Nutrient Needs [ ] Overweight/Obesity [x] altered nutrition related labs  [ ] Altered GI Function [ ] Unintended Weight Loss [ ] Food & Nutrition Related Knowledge Deficit [ ] Malnutrition     Nutrition Diagnosis is [x ] ongoing  [ ] resolved [ ] not applicable     New Nutrition Diagnosis: [ ] not applicable       Interventions: mech soft diet, con cho low na  Recommend  [ ] Change Diet To:  [ ] Nutrition Supplement  [ ] Nutrition Support  [ ] Other:     Monitoring and Evaluation:   [ x] PO intake [ x ] Tolerance to diet prescription [ x ] weights [ x ] labs[ x ] follow up per protocol  [ ] other:

## 2017-12-05 NOTE — PROGRESS NOTE ADULT - SUBJECTIVE AND OBJECTIVE BOX
Patient is a 62y old  Male who presents with a chief complaint of c/o SOB, respiratory distress, s/p cardiopulmonary arrest (30 Nov 2017 10:32)      Patient seen in follow up for EDWIN, CKD 4. Pt resting in bed. Oxygenating better.     PAST MEDICAL HISTORY:  Peripheral Arterial Disease  Controlled Type 2 Diabetes with Leg or Foot Ulcer  ETOH Abuse     MEDICATIONS  (STANDING):  ALBUTerol/ipratropium for Nebulization 3 milliLiter(s) Nebulizer every 6 hours  amLODIPine   Tablet 5 milliGRAM(s) Oral daily  buDESOnide 160 MICROgram(s)/formoterol 4.5 MICROgram(s) Inhaler 2 Puff(s) Inhalation two times a day  dextrose 5%. 1000 milliLiter(s) (50 mL/Hr) IV Continuous <Continuous>  dextrose 50% Injectable 12.5 Gram(s) IV Push once  dextrose 50% Injectable 25 Gram(s) IV Push once  dextrose 50% Injectable 25 Gram(s) IV Push once  epoetin flower Injectable 05275 Unit(s) SubCutaneous <User Schedule>  furosemide   Injectable 60 milliGRAM(s) IV Push every 12 hours  heparin  Injectable 5000 Unit(s) SubCutaneous every 8 hours  influenza   Vaccine 0.5 milliLiter(s) IntraMuscular once  insulin lispro (HumaLOG) corrective regimen sliding scale   SubCutaneous Before meals and at bedtime  metoprolol     tartrate 25 milliGRAM(s) Oral every 8 hours  pantoprazole  Injectable 40 milliGRAM(s) IV Push daily  piperacillin/tazobactam IVPB. 3.375 Gram(s) IV Intermittent every 12 hours    MEDICATIONS  (PRN):  dextrose Gel 1 Dose(s) Oral once PRN Blood Glucose LESS THAN 70 milliGRAM(s)/deciliter  glucagon  Injectable 1 milliGRAM(s) IntraMuscular once PRN Glucose LESS THAN 70 milligrams/deciliter    T(C): 36.7 (12-05-17 @ 08:01), Max: 37 (12-03-17 @ 16:04)  HR: 66 (12-05-17 @ 10:00) (51 - 86)  BP: 147/57 (12-05-17 @ 10:00) (94/61 - 178/68)  RR: 15 (12-05-17 @ 10:00)  SpO2: 95% (12-05-17 @ 10:00)  Wt(kg): --  I&O's Detail    04 Dec 2017 07:01  -  05 Dec 2017 07:00  --------------------------------------------------------  IN:    IV PiggyBack: 200 mL    Oral Fluid: 220 mL  Total IN: 420 mL    OUT:    Voided: 950 mL  Total OUT: 950 mL    Total NET: -530 mL              PHYSICAL EXAM:  General: NAD  Respiratory: b/l air entry  Cardiovascular: S1 S2  Gastrointestinal: soft  Extremities: left BKA.           LABORATORY:                        8.4    5.5   )-----------( 172      ( 05 Dec 2017 06:54 )             27.2     12-05    147<H>  |  108  |  62<H>  ----------------------------<  113<H>  4.8   |  22  |  5.18<H>    Ca    9.0      05 Dec 2017 06:54    TPro  7.1  /  Alb  2.7<L>  /  TBili  0.5  /  DBili  x   /  AST  15  /  ALT  16  /  AlkPhos  85  12-05    Sodium, Serum: 147 mmol/L (12-05 @ 06:54)  Sodium, Serum: 145 mmol/L (12-04 @ 06:41)    Potassium, Serum: 4.8 mmol/L (12-05 @ 06:54)  Potassium, Serum: 4.6 mmol/L (12-04 @ 06:41)    Hemoglobin: 8.4 g/dL (12-05 @ 06:54)  Hemoglobin: 7.5 g/dL (12-04 @ 06:41)  Hemoglobin: 7.6 g/dL (12-03 @ 06:15)    Creatinine, Serum 5.18 (12-05 @ 06:54)  Creatinine, Serum 5.01 (12-04 @ 06:41)  Creatinine, Serum 5.14 (12-03 @ 06:15)        LIVER FUNCTIONS - ( 05 Dec 2017 06:54 )  Alb: 2.7 g/dL / Pro: 7.1 g/dL / ALK PHOS: 85 U/L / ALT: 16 U/L DA / AST: 15 U/L / GGT: x

## 2017-12-05 NOTE — PROGRESS NOTE ADULT - SUBJECTIVE AND OBJECTIVE BOX
PULMONARY/CRITICAL CARE      INTERVAL HPI/OVERNIGHT EVENTS:  Improved, less sob, more alert. On nasal cannula.  62y MaleHPI:  61 y/o male with PMH of PVD s/p left BKA, ETOH, ? COPD, ? ckd was sent from [facility] for evaluation of low Pulse ox.  Pt had cardiopulmonary arrest in ED due to acute respiratory failure. .Pt is intubated, +coker cath. ? aspiration - empirically on antibiotics. elevated lactate4.8 on admission which is trending down.  High BNP. CXR -right sided. patchy opacification. ECHO- normal as per cardiology. unequal pupils- right- 4mmand left 2mm. CAT head- no acute event. elevated BNP. (30 Nov 2017 10:32)        PAST MEDICAL & SURGICAL HISTORY:  Peripheral Arterial Disease  Controlled Type 2 Diabetes with Leg or Foot Ulcer  ETOH Abuse  Amputation, Below Knee, Unilateral, Traumatic        ICU Vital Signs Last 24 Hrs  T(C): 36.7 (05 Dec 2017 08:01), Max: 36.9 (04 Dec 2017 16:07)  T(F): 98 (05 Dec 2017 08:01), Max: 98.4 (04 Dec 2017 16:07)  HR: 63 (05 Dec 2017 08:00) (51 - 68)  BP: 136/95 (05 Dec 2017 08:00) (94/61 - 173/79)  BP(mean): 103 (05 Dec 2017 08:00) (70 - 104)  ABP: --  ABP(mean): --  RR: 15 (05 Dec 2017 08:00) (12 - 18)  SpO2: 92% (05 Dec 2017 08:00) (92% - 99%)    Qtts:     I&O's Summary    04 Dec 2017 07:01  -  05 Dec 2017 07:00  --------------------------------------------------------  IN: 420 mL / OUT: 950 mL / NET: -530 mL        REVIEW OF SYSTEMS:    CONSTITUTIONAL: No fever, weight loss, or fatigue  EYES: No eye pain, visual disturbances, or discharge  ENMT:  No difficulty hearing, tinnitus, vertigo; No sinus or throat pain  NECK: No pain or stiffness  BREASTS: No pain, masses, or nipple discharge  RESPIRATORY: No cough, wheezing, chills or hemoptysis;mild  shortness of breath  CARDIOVASCULAR: No chest pain, palpitations, dizziness, or leg swelling  GASTROINTESTINAL: No abdominal or epigastric pain. No nausea, vomiting, or hematemesis; No diarrhea or constipation. No melena or hematochezia.  GENITOURINARY: No dysuria, frequency, hematuria, or incontinence  NEUROLOGICAL: No headaches, memory loss, loss of strength, numbness, or tremors  SKIN: No itching, burning, rashes, or lesions   LYMPH NODES: No enlarged glands  ENDOCRINE: No heat or cold intolerance; No hair loss  MUSCULOSKELETAL: No joint pain or swelling; No muscle, back, or extremity pain, no calf tenderness  PSYCHIATRIC: No depression, anxiety, mood swings, or difficulty sleeping  HEME/LYMPH: No easy bruising, or bleeding gums  ALLERGY AND IMMUNOLOGIC: No hives or eczema      PHYSICAL EXAM:    GENERAL: , well-developed, NAD  HEAD:  Atraumatic, Normocephalic  EYES: EOMI, PERRLA, conjunctiva and sclera clear  ENMT: No tonsillar erythema, exudates, or enlargement; Moist mucous membranes, Good dentition, No lesions  NECK: Supple, No JVD, Normal thyroid  NERVOUS SYSTEM:  Alert & Oriented X3, Good concentration; Motor Strength 5/5 B/L upper and lower extremities  CHEST/LUNG: Few bas rales, rhonchi, No wheezing, or rubs  Good excursion  HEART: Regular rate and rhythm; No murmurs, rubs, or gallops  ABDOMEN: Soft, Nontender, Nondistended; Bowel sounds present  EXTREMITIES:  2+ Peripheral Pulses, No clubbing, cyanosis, or edema : left bka  LYMPH: No lymphadenopathy noted  SKIN: No rashes or lesions          LABS:                        8.4    5.5   )-----------( 172      ( 05 Dec 2017 06:54 )             27.2     12-05    147<H>  |  108  |  62<H>  ----------------------------<  113<H>  4.8   |  22  |  5.18<H>    Ca    9.0      05 Dec 2017 06:54    TPro  7.1  /  Alb  2.7<L>  /  TBili  0.5  /  DBili  x   /  AST  15  /  ALT  16  /  AlkPhos  85  12-05          vanco through     RADIOLOGY & ADDITIONAL STUDIES:      CRITICAL CARE TIME SPENT:

## 2017-12-05 NOTE — PROGRESS NOTE ADULT - ASSESSMENT
·	EDWIN on CKD 4: ATN  ·	s/p cardiac arrest, on vent, ? Pneumonia  ·	Metaboic acidosis  ·	Hyperkalemia  ·	Diabetes  ·	Anemia    Stable creatinine. IV lasix. Will change to PO lasix. On Po bicarb.  Monitor h/h trend. Procrit rx.   Pulmonary and cardiology follow up. On zosyn. Will follow electrolytes and renal function trend.   No indication for HD a this time. D/c planning if stable.

## 2017-12-05 NOTE — PROGRESS NOTE ADULT - ASSESSMENT
62 year old male s/p cardiopulmonary arrest extubated still with respiratory distress.   Probable pneumonia  Clinically improved.  Has EDWIN

## 2017-12-05 NOTE — CONSULT NOTE ADULT - CONSULT REQUESTED DATE/TIME
05-Dec-2017 11:51
30-Nov-2017 05:54
30-Nov-2017 09:01
30-Nov-2017 10:53
30-Nov-2017 13:00
30-Nov-2017
30-Nov-2017 13:42

## 2017-12-05 NOTE — PROGRESS NOTE ADULT - ASSESSMENT
63 y/o male with PMH of PVD s/p left BKA, ETOH, ? COPD, ? ckd was sent from [facility] for evaluation of low Pulse ox.  Pt had cardiopulmonary arrest in ED due to acute respiratory failure. .Pt was intubated, now extubated.

## 2017-12-05 NOTE — PROGRESS NOTE ADULT - SUBJECTIVE AND OBJECTIVE BOX
REASON FOR VISIT: S/p cardiac arrest    HPI: 62 year old man with a history of chronic illness, severe PVD s/p left BKA, anemia, peripheral neuropathy, uncontrolled DM, etOH abuse transferred from his long term care facility for the evaluation of hypoxia; now s/p asystolic arrest in ED due to hypoxic respiratory failure; also with renal failure.    12/1/17:  Sedated on vent; more responsive per RN.  12/2/17: extubated and breathing better but still SOB and sating in high 80's to low 90's.  12/3/17: resting and sleeping comfortably, Sat in 92% range.    12/4/17  patient is feeling better , denies any sob , patient blood pressure is better ,     MEDICATIONS  (STANDING):  ALBUTerol/ipratropium for Nebulization 3 milliLiter(s) Nebulizer every 6 hours  amLODIPine   Tablet 5 milliGRAM(s) Oral daily  buDESOnide 160 MICROgram(s)/formoterol 4.5 MICROgram(s) Inhaler 2 Puff(s) Inhalation two times a day  dextrose 5%. 1000 milliLiter(s) (50 mL/Hr) IV Continuous <Continuous>  dextrose 50% Injectable 12.5 Gram(s) IV Push once  dextrose 50% Injectable 25 Gram(s) IV Push once  dextrose 50% Injectable 25 Gram(s) IV Push once  epoetin flower Injectable 52192 Unit(s) SubCutaneous <User Schedule>  furosemide   Injectable 60 milliGRAM(s) IV Push every 12 hours  heparin  Injectable 5000 Unit(s) SubCutaneous every 8 hours  influenza   Vaccine 0.5 milliLiter(s) IntraMuscular once  insulin lispro (HumaLOG) corrective regimen sliding scale   SubCutaneous Before meals and at bedtime  metoprolol     tartrate 25 milliGRAM(s) Oral every 8 hours  pantoprazole  Injectable 40 milliGRAM(s) IV Push daily  piperacillin/tazobactam IVPB. 3.375 Gram(s) IV Intermittent every 12 hours    MEDICATIONS  (PRN):  dextrose Gel 1 Dose(s) Oral once PRN Blood Glucose LESS THAN 70 milliGRAM(s)/deciliter  glucagon  Injectable 1 milliGRAM(s) IntraMuscular once PRN Glucose LESS THAN 70 milligrams/deciliter      Vital Signs Last 24 Hrs  T(C): 36.7 (05 Dec 2017 08:01), Max: 36.9 (04 Dec 2017 16:07)  T(F): 98 (05 Dec 2017 08:01), Max: 98.4 (04 Dec 2017 16:07)  HR: 63 (05 Dec 2017 08:00) (51 - 68)  BP: 136/95 (05 Dec 2017 08:00) (94/61 - 173/79)  BP(mean): 103 (05 Dec 2017 08:00) (70 - 104)  RR: 15 (05 Dec 2017 08:00) (12 - 18)  SpO2: 92% (05 Dec 2017 08:00) (92% - 99%)    I&O's Summary    04 Dec 2017 07:01  -  05 Dec 2017 07:00  --------------------------------------------------------  IN: 420 mL / OUT: 950 mL / NET: -530 mL          PHYSICAL EXAM:  Constitutional: awake and in no distress but mildly tachypneic  Pulmonary: decreased breath sounds (R>L), scant ronchi bilaterally  Cardiovascular: S1 and S2, mild bradycardia, 2/6 ROSARIO LUSB  Gastrointestinal: Abdomen is soft.   Lymph: No pedal edema. S/p BKA  Skin: No rash.    LABS:                             8.4    5.5   )-----------( 172      ( 05 Dec 2017 06:54 )             27.2     12-05    147<H>  |  108  |  62<H>  ----------------------------<  113<H>  4.8   |  22  |  5.18<H>    Ca    9.0      05 Dec 2017 06:54    TPro  7.1  /  Alb  2.7<L>  /  TBili  0.5  /  DBili  x   /  AST  15  /  ALT  16  /  AlkPhos  85  12-05        LIVER FUNCTIONS - ( 05 Dec 2017 06:54 )  Alb: 2.7 g/dL / Pro: 7.1 g/dL / ALK PHOS: 85 U/L / ALT: 16 U/L DA / AST: 15 U/L / GGT: x                 ECG (11/30 @ 6:03): Sinus bradycardia 59 bpm, low QRS voltage in limb leads, possible inferior infarct (old), mildly prolonged QTc    CT Chest No Cont (11.30.17 @ 08:25): Small bilateral pleural effusions.  Bilateral airspace disease present. Airspace disease present in the right upper lobe right middle lobe and right lower lobe. Airspace disease visible in the left lower lobe. No mediastinal lesions evident. Hilar regions obscured by bilateral lung pathology. Cardiomegaly and coronary artery calcifications present. No evidence of pericardial effusion. No evidence of thoracic aortic aneurysm. The central airway is intact. An endotracheal tube is present. No adrenal  lesions. The spleen is not enlarged. No acute osseous abnormalities.    US Transthoracic Echocardiogram w/Doppler Complete (11.30.17 @ 08:14) >  1. Normal left ventricular size and function. LVEF estimated at 65-70%.  2. Normal right ventricular size and function.  3. Minimal prolapse of the anterior mitral leaflet. Mild mitral stenosis.  Minimal mitral regurgitation.  4. Mild aortic stenosis.  5. Mild tricuspid regurgitation.     Tele: Sinus rhythm

## 2017-12-05 NOTE — PROGRESS NOTE ADULT - SUBJECTIVE AND OBJECTIVE BOX
JP SANDOVAL is a 62yMale , patient examined and chart reviewed. Patient being followed for acute respiratory failure with brief cardiac arrest and EDWIN on CKD     INTERVAL HPI/ OVERNIGHT EVENTS: Events noted, remains on nasal canula all night . Remains afebrile , denies any chest pains. Renal follow up noted started on high dose diuretics       Past Medical History--  PAST MEDICAL & SURGICAL HISTORY:  Peripheral Arterial Disease  Controlled Type 2 Diabetes with Leg or Foot Ulcer  ETOH Abuse  Amputation, Below Knee, Unilateral, Traumatic      For details regarding the patient's social history, family history, and other miscellaneous elements, please refer the initial infectious diseases consultation and/or the admitting history and physical examination for this admission.  ROS:  CONSTITUTIONAL:  Negative fever or chills, feels well, good appetite  EYES:  Negative  blurry vision or double vision  CARDIOVASCULAR:  Negative for chest pain or palpitations  RESPIRATORY:  Negative for cough, wheezing, or SOB   GASTROINTESTINAL:  Negative for nausea, vomiting, diarrhea, constipation, or abdominal pain  GENITOURINARY:  Negative frequency, urgency , dysuria or hematuria   NEUROLOGIC:  No headache, confusion, dizziness, lightheadedness  All other systems were reviewed and are negative     Allergies:      Current inpatient medications :    ANTIBIOTICS/RELEVANT:  piperacillin/tazobactam IVPB. 3.375 Gram(s) IV Intermittent every 12 hours      ALBUTerol/ipratropium for Nebulization 3 milliLiter(s) Nebulizer every 6 hours  amLODIPine   Tablet 5 milliGRAM(s) Oral daily  buDESOnide 160 MICROgram(s)/formoterol 4.5 MICROgram(s) Inhaler 2 Puff(s) Inhalation two times a day  dextrose 5%. 1000 milliLiter(s) IV Continuous <Continuous>  dextrose 50% Injectable 12.5 Gram(s) IV Push once  dextrose 50% Injectable 25 Gram(s) IV Push once  dextrose 50% Injectable 25 Gram(s) IV Push once  dextrose Gel 1 Dose(s) Oral once PRN  furosemide   Injectable 60 milliGRAM(s) IV Push every 12 hours  glucagon  Injectable 1 milliGRAM(s) IntraMuscular once PRN  heparin  Injectable 5000 Unit(s) SubCutaneous every 8 hours  insulin lispro (HumaLOG) corrective regimen sliding scale   SubCutaneous Before meals and at bedtime  metoprolol     tartrate 25 milliGRAM(s) Oral every 8 hours  pantoprazole  Injectable 40 milliGRAM(s) IV Push daily      Objective:    12-04 @ 07:01  -  12-05 @ 07:00  --------------------------------------------------------  IN: 420 mL / OUT: 950 mL / NET: -530 mL      T(C): 36.4 (12-05-17 @ 04:04), Max: 36.9 (12-04-17 @ 16:07)  HR: 66 (12-05-17 @ 06:00) (51 - 68)  BP: 94/61 (12-05-17 @ 06:00) (94/61 - 173/79)  RR: 16 (12-05-17 @ 06:00) (12 - 18)  SpO2: 94% (12-05-17 @ 06:00) (93% - 99%)  Wt(kg): --      Physical Exam:  GEN: NAD, pleasant  HEENT: normocephalic and atraumatic. EOMI. [place]. Moist mucosa. Clear Posterior pharynx.  NECK: Supple. No carotid bruits.  No lymphadenopathy or thyromegaly.  LUNGS: decreased breath sounds on  auscultation.  HEART: Regular rate and rhythm without murmur.  ABDOMEN: Soft, nontender, and nondistended.  Positive bowel sounds.  No hepatosplenomegaly was noted.  EXTREMITIES: Without any cyanosis, clubbing, rash, lesions or edema. left bka  NEUROLOGIC: A & O x3, No focal neurological deficits   SKIN: No ulceration or induration present.      LABS:                        8.4    5.5   )-----------( 172      ( 05 Dec 2017 06:54 )             27.2       12-04    145  |  109<H>  |  59<H>  ----------------------------<  139<H>  4.6   |  23  |  5.01<H>    Ca    8.4      04 Dec 2017 06:41                   RECENT CULTURES:          RADIOLOGY & ADDITIONAL STUDIES:    Xray Chest 1 View AP-PORTABLE IMMEDIATE (12.04.17 @ 06:45) >  Comparison study dated December 2, 2017.    Cardiac monitor leads present.    Bilateral airspace disease right greater than left essentially unchanged   since the prior exam when allowing for technical differences. Loss of   definition, left hemidiaphragm and left costophrenic angle. Heart size   appears enlarged although magnified secondary to portable technique.   Aorta shows atherosclerotic changes. No acute osseous abnormalities.    Assessment :    62 year old male hx of ? COPD, ETOH abuse, PVD, s/p right BKA , ? CKD vs EDWIN admitted with acuter cardiorespiratory arrest , primarily respiratory failure leading to cardiac arrest , has unequal pupils etiology is unclear, as Ct head is negative ,  seen by neurology  .    CXR shows extensive right sided infiltrates , has elevated lactate and severe metabolic acidosis    Ct chest reveals bilateral effusions with bilateral lower lobe consolidations ? aspiration vs fluid overload . Doubt he has worsening pneumonia without any significant productive cough, fever or leukocytosis    Plan :   - continue with IV Zosyn complete total 7 days   - continue with  diuretics   - OOB , PT evaluation   - consider thoracocentesis to see if pleural effusion is exudate , will discuss with pulmonary   - serial CXR   - may need HD if no improvement in CXR    I have discussed the above plan of care with patient and  family in detail. They expressed understanding of the the treatment plan . Risks, benefits and alternatives discussed in detail. I have asked if they have any questions or concerns and appropriately addressed them to the best of my ability .      Critical care time greater then 15 minutes reviewing notes, labs data/ imaging , discussion with multidisciplinary team.    Thank you for allowing me to participate in care of your patient .        Gildardo Daley MD  [phone]

## 2017-12-05 NOTE — PROGRESS NOTE ADULT - PROBLEM SELECTOR PLAN 1
c/w  [facility] level of care .  c/w iv zosyn for pneumonia.  keep sats>90%.  continues to require 4-5 liters supplemental oxygen.  CXR appears worse  right>left airspace disease/effusions.  further care per PCCM

## 2017-12-05 NOTE — PROGRESS NOTE ADULT - PROBLEM SELECTOR PLAN 1
S/p cardiac arrest secondary to hypoxic respiratory failure; hemodynamically stable and without arrhythmias on telemetry with normal LV function; continue to optimize pulmonary status with treatment of PNA. clinically appears stable

## 2017-12-05 NOTE — PROGRESS NOTE ADULT - SUBJECTIVE AND OBJECTIVE BOX
62y  Male  No Known Allergies    CC: Patient is a 62y old  Male who presents with a chief complaint of c/o SOB, respiratory distress, s/p cardiopulmonary arrest     HPI:  61 y/o male with PMH of PVD s/p left BKA, ETOH,  COPD,  was sent from [facility] for evaluation of low Pulse ox. He went into cardiopulmonary arrest in ED due to acute hypoxic  respiratory failure. .There is qustion of aspiration preceding the event as cause for initial hypoxia and placed empirically on antibiotics. although the BNP was elevated and acute CHF exacerbation cannot be ruled out as cause. The chest xray showed patchy opacification on the right, TTE showed minimal mitral prolapse but otherwise was normal, post arrest CT brain showed no acute events. Patient still in desaturating into the high low 80's when off oxygen , hes been on and off BIPAP / venti mask  for past day or two today was tried on nasal cannula and was stable on 4 liters for most of the day until tonight when he became  increased in his oxygen demand and dropped his saturation. When he reamined hypoxic he was changed to 40% face mask. Pt otherwise is fully alert and oriented to person place and time with only compliant of SOB.          PAST MEDICAL & SURGICAL HISTORY:  Peripheral Arterial Disease  Controlled Type 2 Diabetes with Leg or Foot Ulcer  ETOH Abuse  Amputation, Below Knee, Unilateral, Traumatic    FAMILY HISTORY:      Vitals   Vital Signs Last 24 Hrs  T(C): 36.9 (05 Dec 2017 15:55), Max: 37 (05 Dec 2017 12:01)  T(F): 98.5 (05 Dec 2017 15:55), Max: 98.6 (05 Dec 2017 12:01)  HR: 67 (05 Dec 2017 18:00) (57 - 72)  BP: 157/65 (05 Dec 2017 18:00) (94/61 - 167/70)  BP(mean): 92 (05 Dec 2017 18:00) (70 - 103)  RR: 15 (05 Dec 2017 18:00) (13 - 26)  SpO2: 97% (05 Dec 2017 18:00) (92% - 99%)    I&O's Summary    04 Dec 2017 07:01  -  05 Dec 2017 07:00  --------------------------------------------------------  IN: 420 mL / OUT: 950 mL / NET: -530 mL    05 Dec 2017 07:01  -  05 Dec 2017 20:14  --------------------------------------------------------  IN: 300 mL / OUT: 250 mL / NET: 50 mL        LABS  12-05    147<H>  |  108  |  62<H>  ----------------------------<  113<H>  4.8   |  22  |  5.18<H>    Ca    9.0      05 Dec 2017 06:54    TPro  7.1  /  Alb  2.7<L>  /  TBili  0.5  /  DBili  x   /  AST  15  /  ALT  16  /  AlkPhos  85  12-05                          8.4    5.5   )-----------( 172      ( 05 Dec 2017 06:54 )             27.2           LIVER FUNCTIONS - ( 05 Dec 2017 06:54 )  Alb: 2.7 g/dL / Pro: 7.1 g/dL / ALK PHOS: 85 U/L / ALT: 16 U/L DA / AST: 15 U/L / GGT: x           CAPILLARY BLOOD GLUCOSE      POCT Blood Glucose.: 229 mg/dL (05 Dec 2017 16:04)        VENT SETTINGS       Meds  MEDICATIONS  (STANDING):  ALBUTerol/ipratropium for Nebulization 3 milliLiter(s) Nebulizer every 6 hours  amLODIPine   Tablet 5 milliGRAM(s) Oral daily  buDESOnide 160 MICROgram(s)/formoterol 4.5 MICROgram(s) Inhaler 2 Puff(s) Inhalation two times a day  dextrose 5%. 1000 milliLiter(s) (50 mL/Hr) IV Continuous <Continuous>  dextrose 50% Injectable 12.5 Gram(s) IV Push once  dextrose 50% Injectable 25 Gram(s) IV Push once  dextrose 50% Injectable 25 Gram(s) IV Push once  epoetin flower Injectable 62799 Unit(s) SubCutaneous <User Schedule>  ferrous    sulfate 325 milliGRAM(s) Oral daily  heparin  Injectable 5000 Unit(s) SubCutaneous every 8 hours  influenza   Vaccine 0.5 milliLiter(s) IntraMuscular once  insulin lispro (HumaLOG) corrective regimen sliding scale   SubCutaneous Before meals and at bedtime  metoprolol     tartrate 25 milliGRAM(s) Oral every 8 hours  pantoprazole  Injectable 40 milliGRAM(s) IV Push daily  piperacillin/tazobactam IVPB. 3.375 Gram(s) IV Intermittent every 12 hours      REVIEW OF SYSTEMS:    CONSTITUTIONAL: No fever, weight loss, or fatigue  EYES: No eye pain, visual disturbances, or discharge  ENMT:  No difficulty hearing, tinnitus, vertigo; No sinus or throat pain  NECK: No pain or stiffness  BREASTS: No pain, masses, or nipple discharge  RESPIRATORY: No cough, wheezing, chills or hemoptysis; No shortness of breath  CARDIOVASCULAR: No chest pain, palpitations, dizziness, or leg swelling  GASTROINTESTINAL: No abdominal or epigastric pain. No nausea, vomiting, or hematemesis; No diarrhea or constipation. No melena or hematochezia.  GENITOURINARY: No dysuria, frequency, hematuria, or incontinence  NEUROLOGICAL: No headaches, memory loss, loss of strength, numbness, or tremors  SKIN: No itching, burning, rashes, or lesions   LYMPH NODES: No enlarged glands  ENDOCRINE: No heat or cold intolerance; No hair loss  MUSCULOSKELETAL: No joint pain or swelling; No muscle, back, or extremity pain  PSYCHIATRIC: No depression, anxiety, mood swings, or difficulty sleeping  HEME/LYMPH: No easy bruising, or bleeding gums  ALLERY AND IMMUNOLOGIC: No hives or eczema      Physicial Exam:     Constitutional: NAD, well-groomed, well-developed  HEENT: PERRLA, EOMI, no drainage or redness  Neck: No bruits; no thyromegaly or nodules,  No JVD  Back: Normal spine flexure, No CVA tenderness, No deformity or limitation of movement  Respiratory: Breath Sounds equal & clear to percussion & auscultation, no accessory muscle use  Cardiovascular: Regular rate & rhythm, normal S1, S2; no murmurs, gallops or rubs; no S3, S4  Gastrointestinal: Soft, non-tender, non distended no hepatosplenomegaly, normal bowel sounds  Extremities: No peripheral edema, No cyanosis, clubbing   Vascular: Equal and normal pulses: 2+ peripheral pulses throughout  Neurological: GCS:    A&O x 3; no sensory, motor  deficits, normal reflexes  Psychiatric: Normal mood, normal affect  Musculoskeletal: No joint pain, swelling or deformity; no limitation of movement  Skin: No rashes

## 2017-12-05 NOTE — CONSULT NOTE ADULT - CONSULT REASON
AMS/S/p Cardiac Arrest
Asked to see patient for cardiac arrest (now s/p resuscitation)
anemia
cardiac arrest
resp failure  poss PNA  CHF  pleural eff
Acute cardio respiratory arrest with severe sepsis
EDWIN, CKD 4

## 2017-12-05 NOTE — PROGRESS NOTE ADULT - SUBJECTIVE AND OBJECTIVE BOX
Patient is a 62y old  Male who presents with a chief complaint of c/o SOB, respiratory distress, s/p cardiopulmonary arrest (30 Nov 2017 10:32)      INTERVAL History of Present Illness/OVERNIGHT EVENTS: continues to require 4-5 liters supplemental oxygen.  CXR appears worse  right>left airspace disease/effusions.  clinically comfortable and pleasant.    MEDICATIONS  (STANDING):  ALBUTerol/ipratropium for Nebulization 3 milliLiter(s) Nebulizer every 6 hours  amLODIPine   Tablet 5 milliGRAM(s) Oral daily  buDESOnide 160 MICROgram(s)/formoterol 4.5 MICROgram(s) Inhaler 2 Puff(s) Inhalation two times a day  dextrose 5%. 1000 milliLiter(s) (50 mL/Hr) IV Continuous <Continuous>  dextrose 50% Injectable 12.5 Gram(s) IV Push once  dextrose 50% Injectable 25 Gram(s) IV Push once  dextrose 50% Injectable 25 Gram(s) IV Push once  epoetin flower Injectable 72139 Unit(s) SubCutaneous <User Schedule>  heparin  Injectable 5000 Unit(s) SubCutaneous every 8 hours  influenza   Vaccine 0.5 milliLiter(s) IntraMuscular once  insulin lispro (HumaLOG) corrective regimen sliding scale   SubCutaneous Before meals and at bedtime  metoprolol     tartrate 25 milliGRAM(s) Oral every 8 hours  pantoprazole  Injectable 40 milliGRAM(s) IV Push daily  piperacillin/tazobactam IVPB. 3.375 Gram(s) IV Intermittent every 12 hours    MEDICATIONS  (PRN):  dextrose Gel 1 Dose(s) Oral once PRN Blood Glucose LESS THAN 70 milliGRAM(s)/deciliter  glucagon  Injectable 1 milliGRAM(s) IntraMuscular once PRN Glucose LESS THAN 70 milligrams/deciliter      Allergies    No Known Allergies    Intolerances        REVIEW OF SYSTEMS:  Negative unless otherwise specified above.    Vital Signs Last 24 Hrs  T(C): 36.7 (05 Dec 2017 08:01), Max: 36.9 (04 Dec 2017 16:07)  T(F): 98 (05 Dec 2017 08:01), Max: 98.4 (04 Dec 2017 16:07)  HR: 66 (05 Dec 2017 12:00) (51 - 68)  BP: 167/70 (05 Dec 2017 12:00) (94/61 - 173/79)  BP(mean): 99 (05 Dec 2017 12:00) (70 - 104)  RR: 26 (05 Dec 2017 12:00) (12 - 26)  SpO2: 94% (05 Dec 2017 12:00) (92% - 99%)        PHYSICAL EXAM:  GENERAL: No apparent distress  HEAD:  Atraumatic, Normocephalic  EYES: conjunctiva and sclera clear  ENMT: Moist mucous membranes  NECK: Supple, No Jugular venous distension  CHEST/LUNG: decreased BS lower lungs  HEART: Regular rate and rhythm  ABDOMEN: Soft, Nontender, Nondistended; Bowel sounds present  EXTREMITIES:  left BKA  SKIN: No rashes or lesions  NERVOUS SYSTEM:  Alert & Oriented X3, Good concentration; Bilateral Lower extremity mobile, sensation to light touch intact      LABS:                        8.4    5.5   )-----------( 172      ( 05 Dec 2017 06:54 )             27.2     05 Dec 2017 06:54    147    |  108    |  62     ----------------------------<  113    4.8     |  22     |  5.18     Ca    9.0        05 Dec 2017 06:54    TPro  7.1    /  Alb  2.7    /  TBili  0.5    /  DBili  x      /  AST  15     /  ALT  16     /  AlkPhos  85     05 Dec 2017 06:54        CAPILLARY BLOOD GLUCOSE      POCT Blood Glucose.: 168 mg/dL (05 Dec 2017 11:14)  POCT Blood Glucose.: 115 mg/dL (05 Dec 2017 07:23)  POCT Blood Glucose.: 139 mg/dL (04 Dec 2017 22:07)  POCT Blood Glucose.: 128 mg/dL (04 Dec 2017 15:57)      RADIOLOGY & ADDITIONAL TESTS:    Images reviewed personally    Consultant Notes Reviewed and Care Discussed with relevant Consultants/Other Providers.

## 2017-12-05 NOTE — PROGRESS NOTE ADULT - PROBLEM SELECTOR PLAN 1
Patient currently switched from nasal cannula to  40% face mask will asses need for BIPAP overnight if he shows increased work of breathing with accessory muscle use    ABG  HOB 30 degrees   aggressive chest PT and suctioning   daily off BIAP trial if clinical condition warrants, discuss with respiratory therapy  continue diuresis.

## 2017-12-06 DIAGNOSIS — N18.9 CHRONIC KIDNEY DISEASE, UNSPECIFIED: ICD-10-CM

## 2017-12-06 LAB
% ALBUMIN: 51.5 % — SIGNIFICANT CHANGE UP
% ALPHA 1: 8.9 % — SIGNIFICANT CHANGE UP
% ALPHA 2: 17.6 % — SIGNIFICANT CHANGE UP
% BETA: 11.2 % — SIGNIFICANT CHANGE UP
% GAMMA: 10.8 % — SIGNIFICANT CHANGE UP
ALBUMIN SERPL ELPH-MCNC: 3.1 G/DL — LOW (ref 3.6–5.5)
ALBUMIN/GLOB SERPL ELPH: 1 RATIO — SIGNIFICANT CHANGE UP
ALPHA1 GLOB SERPL ELPH-MCNC: 0.5 G/DL — HIGH (ref 0.1–0.4)
ALPHA2 GLOB SERPL ELPH-MCNC: 1.1 G/DL — HIGH (ref 0.5–1)
ANION GAP SERPL CALC-SCNC: 14 MMOL/L — SIGNIFICANT CHANGE UP (ref 5–17)
B-GLOBULIN SERPL ELPH-MCNC: 0.7 G/DL — SIGNIFICANT CHANGE UP (ref 0.5–1)
BUN SERPL-MCNC: 55 MG/DL — HIGH (ref 7–23)
CALCIUM SERPL-MCNC: 8 MG/DL — LOW (ref 8.4–10.5)
CHLORIDE SERPL-SCNC: 109 MMOL/L — HIGH (ref 96–108)
CO2 SERPL-SCNC: 23 MMOL/L — SIGNIFICANT CHANGE UP (ref 22–31)
CREAT SERPL-MCNC: 5.02 MG/DL — HIGH (ref 0.5–1.3)
GAMMA GLOBULIN: 0.7 G/DL — SIGNIFICANT CHANGE UP (ref 0.6–1.6)
GLUCOSE BLDC GLUCOMTR-MCNC: 132 MG/DL — HIGH (ref 70–99)
GLUCOSE BLDC GLUCOMTR-MCNC: 175 MG/DL — HIGH (ref 70–99)
GLUCOSE BLDC GLUCOMTR-MCNC: 198 MG/DL — HIGH (ref 70–99)
GLUCOSE BLDC GLUCOMTR-MCNC: 272 MG/DL — HIGH (ref 70–99)
GLUCOSE SERPL-MCNC: 128 MG/DL — HIGH (ref 70–99)
HCT VFR BLD CALC: 23.1 % — LOW (ref 39–50)
HGB BLD-MCNC: 7.7 G/DL — LOW (ref 13–17)
MCHC RBC-ENTMCNC: 31 PG — SIGNIFICANT CHANGE UP (ref 27–34)
MCHC RBC-ENTMCNC: 33.4 GM/DL — SIGNIFICANT CHANGE UP (ref 32–36)
MCV RBC AUTO: 92.8 FL — SIGNIFICANT CHANGE UP (ref 80–100)
OB PNL STL: NEGATIVE — SIGNIFICANT CHANGE UP
PLATELET # BLD AUTO: 154 K/UL — SIGNIFICANT CHANGE UP (ref 150–400)
POTASSIUM SERPL-MCNC: 4.3 MMOL/L — SIGNIFICANT CHANGE UP (ref 3.5–5.3)
POTASSIUM SERPL-SCNC: 4.3 MMOL/L — SIGNIFICANT CHANGE UP (ref 3.5–5.3)
PROT PATTERN SERPL ELPH-IMP: SIGNIFICANT CHANGE UP
PROT SERPL-MCNC: 6.1 G/DL — SIGNIFICANT CHANGE UP (ref 6–8.3)
PROT SERPL-MCNC: 6.1 G/DL — SIGNIFICANT CHANGE UP (ref 6–8.3)
RBC # BLD: 2.49 M/UL — LOW (ref 4.2–5.8)
RBC # FLD: 14 % — SIGNIFICANT CHANGE UP (ref 10.3–14.5)
SODIUM SERPL-SCNC: 146 MMOL/L — HIGH (ref 135–145)
WBC # BLD: 4.4 K/UL — SIGNIFICANT CHANGE UP (ref 3.8–10.5)
WBC # FLD AUTO: 4.4 K/UL — SIGNIFICANT CHANGE UP (ref 3.8–10.5)

## 2017-12-06 PROCEDURE — 99232 SBSQ HOSP IP/OBS MODERATE 35: CPT

## 2017-12-06 PROCEDURE — 71250 CT THORAX DX C-: CPT | Mod: 26

## 2017-12-06 PROCEDURE — 71010: CPT | Mod: 26

## 2017-12-06 PROCEDURE — 99233 SBSQ HOSP IP/OBS HIGH 50: CPT

## 2017-12-06 RX ORDER — FUROSEMIDE 40 MG
40 TABLET ORAL
Qty: 0 | Refills: 0 | Status: DISCONTINUED | OUTPATIENT
Start: 2017-12-06 | End: 2017-12-08

## 2017-12-06 RX ADMIN — PIPERACILLIN AND TAZOBACTAM 25 GRAM(S): 4; .5 INJECTION, POWDER, LYOPHILIZED, FOR SOLUTION INTRAVENOUS at 05:09

## 2017-12-06 RX ADMIN — Medication 25 MILLIGRAM(S): at 14:57

## 2017-12-06 RX ADMIN — PIPERACILLIN AND TAZOBACTAM 25 GRAM(S): 4; .5 INJECTION, POWDER, LYOPHILIZED, FOR SOLUTION INTRAVENOUS at 17:06

## 2017-12-06 RX ADMIN — Medication 3: at 21:43

## 2017-12-06 RX ADMIN — PANTOPRAZOLE SODIUM 40 MILLIGRAM(S): 20 TABLET, DELAYED RELEASE ORAL at 12:23

## 2017-12-06 RX ADMIN — Medication 3 MILLILITER(S): at 12:39

## 2017-12-06 RX ADMIN — Medication 3 MILLILITER(S): at 07:16

## 2017-12-06 RX ADMIN — Medication 40 MILLIGRAM(S): at 17:06

## 2017-12-06 RX ADMIN — HEPARIN SODIUM 5000 UNIT(S): 5000 INJECTION INTRAVENOUS; SUBCUTANEOUS at 21:39

## 2017-12-06 RX ADMIN — Medication 1: at 12:10

## 2017-12-06 RX ADMIN — Medication 1: at 17:06

## 2017-12-06 RX ADMIN — ERYTHROPOIETIN 10000 UNIT(S): 10000 INJECTION, SOLUTION INTRAVENOUS; SUBCUTANEOUS at 17:19

## 2017-12-06 RX ADMIN — BUDESONIDE AND FORMOTEROL FUMARATE DIHYDRATE 2 PUFF(S): 160; 4.5 AEROSOL RESPIRATORY (INHALATION) at 08:00

## 2017-12-06 RX ADMIN — Medication 325 MILLIGRAM(S): at 12:23

## 2017-12-06 RX ADMIN — Medication 3 MILLILITER(S): at 20:23

## 2017-12-06 RX ADMIN — HEPARIN SODIUM 5000 UNIT(S): 5000 INJECTION INTRAVENOUS; SUBCUTANEOUS at 14:57

## 2017-12-06 RX ADMIN — HEPARIN SODIUM 5000 UNIT(S): 5000 INJECTION INTRAVENOUS; SUBCUTANEOUS at 05:09

## 2017-12-06 RX ADMIN — Medication 25 MILLIGRAM(S): at 21:39

## 2017-12-06 RX ADMIN — Medication 40 MILLIGRAM(S): at 05:09

## 2017-12-06 RX ADMIN — Medication 25 MILLIGRAM(S): at 05:09

## 2017-12-06 RX ADMIN — AMLODIPINE BESYLATE 5 MILLIGRAM(S): 2.5 TABLET ORAL at 05:09

## 2017-12-06 RX ADMIN — BUDESONIDE AND FORMOTEROL FUMARATE DIHYDRATE 2 PUFF(S): 160; 4.5 AEROSOL RESPIRATORY (INHALATION) at 18:36

## 2017-12-06 NOTE — PROGRESS NOTE ADULT - PROBLEM SELECTOR PROBLEM 5
CKD (chronic kidney disease) stage 5, GFR less than 15 ml/min
Aspiration into airway, initial encounter
Cardiopulmonary arrest with successful resuscitation
Respiratory failure
Anemia
Anemia
Aspiration into airway, initial encounter
EDWIN (acute kidney injury)
Type 2 diabetes mellitus with diabetic chronic kidney disease, unspecified CKD stage, unspecified long term insulin use status
Type 2 diabetes mellitus with diabetic chronic kidney disease, unspecified CKD stage, unspecified long term insulin use status
Aspiration into airway, initial encounter
Anemia

## 2017-12-06 NOTE — PROGRESS NOTE ADULT - PROBLEM SELECTOR PLAN 5
Avoid nephrotoxins .     Renal evaluation appreciated - no plan for HD currently.  may benefit from HD initiation in near future.  avoid nephrotoxics. monitor intake and output.

## 2017-12-06 NOTE — PROGRESS NOTE ADULT - PROBLEM SELECTOR PLAN 6
procrit per renal.
monitor for withdrawl.
Avoid nephrotoxins .   Monitor BMP closely.   Renal evaluation appreciated - no plan for HD currently.  may benefit from HD initiation in near future.  avoid nephrotoxics. monitor intake and output.
Avoid nephrotoxins .   Monitor BMP closely.   Renal evaluation appreciated.  may benefit from HD initiation in near future.  avoid nephrotoxics. monitor intake and output.
Watch cr.  ??HD
Watch cr.  ??HD
monitor for withdrawal.
Watch cr.  ??HD

## 2017-12-06 NOTE — PROGRESS NOTE ADULT - SUBJECTIVE AND OBJECTIVE BOX
JP SANDOVAL is a 62yMale , patient examined and chart reviewed. Patient being followed for s/p cardio respiratory arrest with possible aspiration pneumonia vs CHF    INTERVAL HPI/ OVERNIGHT EVENTS: Remains afebrile , remains on VM was on BiPAP No change in CXR . Remains afebrile .      Past Medical History--  PAST MEDICAL & SURGICAL HISTORY:  Peripheral Arterial Disease  Controlled Type 2 Diabetes with Leg or Foot Ulcer  ETOH Abuse  Amputation, Below Knee, Unilateral, Traumatic      For details regarding the patient's social history, family history, and other miscellaneous elements, please refer the initial infectious diseases consultation and/or the admitting history and physical examination for this admission.      ROS:  CONSTITUTIONAL:  Negative fever or chills, feels well, good appetite  EYES:  Negative  blurry vision or double vision  CARDIOVASCULAR:  Negative for chest pain or palpitations  RESPIRATORY:  pos  for cough and SOB   GASTROINTESTINAL:  Negative for nausea, vomiting, diarrhea, constipation, or abdominal pain  GENITOURINARY:  Negative frequency, urgency , dysuria or hematuria   NEUROLOGIC:  No headache, confusion, dizziness, lightheadedness  All other systems were reviewed and are negative     Allergies:      Current inpatient medications :    ANTIBIOTICS/RELEVANT:  piperacillin/tazobactam IVPB. 3.375 Gram(s) IV Intermittent every 12 hours      ALBUTerol/ipratropium for Nebulization 3 milliLiter(s) Nebulizer every 6 hours  amLODIPine   Tablet 5 milliGRAM(s) Oral daily  buDESOnide 160 MICROgram(s)/formoterol 4.5 MICROgram(s) Inhaler 2 Puff(s) Inhalation two times a day  dextrose 5%. 1000 milliLiter(s) IV Continuous <Continuous>  dextrose 50% Injectable 12.5 Gram(s) IV Push once  dextrose 50% Injectable 25 Gram(s) IV Push once  dextrose 50% Injectable 25 Gram(s) IV Push once  dextrose Gel 1 Dose(s) Oral once PRN  ferrous    sulfate 325 milliGRAM(s) Oral daily  furosemide    Tablet 40 milliGRAM(s) Oral daily  glucagon  Injectable 1 milliGRAM(s) IntraMuscular once PRN  heparin  Injectable 5000 Unit(s) SubCutaneous every 8 hours  insulin lispro (HumaLOG) corrective regimen sliding scale   SubCutaneous Before meals and at bedtime  metoprolol     tartrate 25 milliGRAM(s) Oral every 8 hours  pantoprazole  Injectable 40 milliGRAM(s) IV Push daily      Objective:    12-05 @ 07:01  -  12-06 @ 07:00  --------------------------------------------------------  IN: 400 mL / OUT: 475 mL / NET: -75 mL      T(C): 36.6 (12-06-17 @ 04:16), Max: 37 (12-05-17 @ 12:01)  HR: 59 (12-06-17 @ 07:17) (59 - 76)  BP: 174/75 (12-06-17 @ 06:05) (136/95 - 174/75)  RR: 13 (12-06-17 @ 06:05) (13 - 26)  SpO2: 100% (12-06-17 @ 07:17) (85% - 100%)  Wt(kg): --      Physical Exam:  GEN: NAD, pleasant  HEENT: normocephalic and atraumatic. EOMI. [place]. Moist mucosa. Clear Posterior pharynx.  NECK: Supple. No carotid bruits.  No lymphadenopathy or thyromegaly.  LUNGS: decreased breath sounds on  auscultation.  HEART: Regular rate and rhythm without murmur.  ABDOMEN: Soft, nontender, and nondistended.  Positive bowel sounds.  No hepatosplenomegaly was noted.  EXTREMITIES: Without any cyanosis, clubbing, rash, lesions or edema.  NEUROLOGIC: A & O x3, No focal neurological deficits   SKIN: No ulceration or induration present.      LABS:                        7.7    4.4   )-----------( 154      ( 06 Dec 2017 06:52 )             23.1       12-05    147<H>  |  108  |  62<H>  ----------------------------<  113<H>  4.8   |  22  |  5.18<H>    Ca    9.0      05 Dec 2017 06:54    TPro  7.1  /  Alb  2.7<L>  /  TBili  0.5  /  DBili  x   /  AST  15  /  ALT  16  /  AlkPhos  85  12-05                 RECENT CULTURES:          RADIOLOGY & ADDITIONAL STUDIES:  Xray Chest 1 View AP -PORTABLE-Routine (12.05.17 @ 05:58) >    Cardiac monitor leads overlie the chest.  Worsening appearance of the chest since the prior exam. Bibasilar   airspace disease, right greater than left. The right hemidiaphragm is   obscured on the current study. Left hemidiaphragm is also obscured not   significantly changed since prior exam.    Heart size appears enlarged, magnified secondary to portable technique.   Aortic knob contains calcifications. No acute osseous abnormalities.      Assessment :  62 year old male hx of ? COPD, ETOH abuse, PVD, s/p right BKA , ? CKD vs EDWIN admitted with acuter cardiorespiratory arrest , primarily respiratory failure leading to cardiac arrest , has unequal pupils etiology is unclear, as Ct head is negative ,  seen by neurology  .    CXR shows extensive right sided infiltrates , has elevated lactate and severe metabolic acidosis    Ct chest reveals bilateral effusions with bilateral lower lobe consolidations ? aspiration vs fluid overload . Doubt he has worsening pneumonia without any significant productive cough, fever or leukocytosis    Plan :   - continue with IV Zosyn to complete total 7 days today   - repeat ct chest , he either needs right thoracocentesis or possible dialysis  - continue with  diuretics   - OOB , PT evaluation   - consider thoracocentesis to see if pleural effusion is exudate , will discuss with pulmonary   - serial CXR   - may need HD if no improvement in CXR    I have discussed the above plan of care with patient and  family in detail. They expressed understanding of the the treatment plan . Risks, benefits and alternatives discussed in detail. I have asked if they have any questions or concerns and appropriately addressed them to the best of my ability .    I will be away from 12/7 thru 12/10/17 , Dr. Salas is covering me.    Critical care time greater then 25 minutes reviewing notes, labs data/ imaging , discussion with multidisciplinary team.    Thank you for allowing me to participate in care of your patient .        Gildardo Daley MD  [phone]

## 2017-12-06 NOTE — PROGRESS NOTE ADULT - ASSESSMENT
·	EDWIN on CKD 4: ATN  ·	s/p cardiac arrest, on vent, ? Pneumonia  ·	Metaboic acidosis  ·	Hyperkalemia  ·	Diabetes  ·	Anemia    Stable creatinine. On PO lasix. Will increase to twice daily. Monitor h/h trend. Procrit rx.   Pulmonary and cardiology follow up. On zosyn. Will follow electrolytes and renal function trend.   For thoracentesis. No indication for HD a this time. D/w Dr. Gautam and Dr. Schmitz.

## 2017-12-06 NOTE — PROGRESS NOTE ADULT - ASSESSMENT
contact #  sister----anel torres  phone # [phone]--was d/w sister on 12/5  pt is with ch anemia, renal insufficiency and is followed by nephrology  per sister pt at [facility] for past 10 yrs, hx etoh abuse  this admission, s/p cardiac arrest--intubation--extubation  sister/hcp refuses invasive hem procedure  pt is on epogen 10 k s/c tiw---m/w/f  pt is on fe qd  monitor h/h--transfuse prbc if symptomatic anemia or if hb is under 7  gi/dvtp per pcp  d/w pt at bedside  d/w pt sister on phone on 12/5/17

## 2017-12-06 NOTE — PROGRESS NOTE ADULT - ASSESSMENT
Assessment and Plan:   Problem/Plan - 1:  ·  Problem: Cardiac arrest.  Plan: S/p cardiac arrest secondary to hypoxic respiratory failure; hemodynamically stable and without arrhythmias on telemetry with normal LV function; continue to optimize pulmonary status.Is receiving antibiotics as per ID. Receiving Lasix.     Problem/Plan - 2:  ·  Problem: Aortic stenosis, mild.  Plan: Chronic. No intervention indicated.     Problem/Plan - 3:  ·  Problem: Renal failure, unspecified chronicity.  Plan: No hydronephrosis; acute on chronic renal failure per nephrology. Dialysis being contemplated. Today' s BMP is pending.     Problem/Plan - 4:  ·  Problem: Anemia.  Plan:Hgb has dropped again. Monitor. Decision re: transfusion to be directed  as per medical service.

## 2017-12-06 NOTE — PROGRESS NOTE ADULT - SUBJECTIVE AND OBJECTIVE BOX
PULMONARY/CRITICAL CARE      INTERVAL HPI/OVERNIGHT EVENTS:  Pt on O2 mask. Denies sob. Alert, nad. No cp.    62y MaleHPI:  63 y/o male with PMH of PVD s/p left BKA, ETOH, ? COPD, ? ckd was sent from [facility] for evaluation of low Pulse ox.  Pt had cardiopulmonary arrest in ED due to acute respiratory failure. .Pt is intubated, +coker cath. ? aspiration - empirically on antibiotics. elevated lactate4.8 on admission which is trending down.  High BNP. CXR -right sided. patchy opacification. ECHO- normal as per cardiology. unequal pupils- right- 4mmand left 2mm. CAT head- no acute event. elevated BNP. (30 Nov 2017 10:32)        PAST MEDICAL & SURGICAL HISTORY:  Peripheral Arterial Disease  Controlled Type 2 Diabetes with Leg or Foot Ulcer  ETOH Abuse  Amputation, Below Knee, Unilateral, Traumatic        ICU Vital Signs Last 24 Hrs  T(C): 36.6 (06 Dec 2017 04:16), Max: 37 (05 Dec 2017 12:01)  T(F): 97.8 (06 Dec 2017 04:16), Max: 98.6 (05 Dec 2017 12:01)  HR: 69 (06 Dec 2017 06:05) (60 - 76)  BP: 174/75 (06 Dec 2017 06:05) (136/95 - 174/75)  BP(mean): 102 (06 Dec 2017 06:05) (82 - 103)  ABP: --  ABP(mean): --  RR: 13 (06 Dec 2017 06:05) (13 - 26)  SpO2: 96% (06 Dec 2017 06:05) (85% - 99%)    Qtts:     I&O's Summary    04 Dec 2017 07:01  -  05 Dec 2017 07:00  --------------------------------------------------------  IN: 420 mL / OUT: 950 mL / NET: -530 mL    05 Dec 2017 07:01  -  06 Dec 2017 06:57  --------------------------------------------------------  IN: 400 mL / OUT: 475 mL / NET: -75 mL        REVIEW OF SYSTEMS:    CONSTITUTIONAL: No fever, weight loss, or fatigue  EYES: No eye pain, visual disturbances, or discharge  ENMT:  No difficulty hearing, tinnitus, vertigo; No sinus or throat pain  NECK: No pain or stiffness  BREASTS: No pain, masses, or nipple discharge  RESPIRATORY: No cough, wheezing, chills or hemoptysis;mild  shortness of breath  CARDIOVASCULAR: No chest pain, palpitations, dizziness, or leg swelling  GASTROINTESTINAL: No abdominal or epigastric pain. No nausea, vomiting, or hematemesis; No diarrhea or constipation. No melena or hematochezia.  GENITOURINARY: No dysuria, frequency, hematuria, or incontinence  NEUROLOGICAL: No headaches, memory loss, loss of strength, numbness, or tremors  SKIN: No itching, burning, rashes, or lesions   LYMPH NODES: No enlarged glands  ENDOCRINE: No heat or cold intolerance; No hair loss  MUSCULOSKELETAL: No joint pain or swelling; No muscle, back, or extremity pain, no calf tenderness  PSYCHIATRIC: No depression, anxiety, mood swings, or difficulty sleeping  HEME/LYMPH: No easy bruising, or bleeding gums  ALLERGY AND IMMUNOLOGIC: No hives or eczema      PHYSICAL EXAM:    GENERAL: , well-developed, NAD  HEAD:  Atraumatic, Normocephalic  EYES: EOMI, PERRLA, conjunctiva and sclera clear  ENMT: No tonsillar erythema, exudates, or enlargement; Moist mucous membranes, Good dentition, No lesions  NECK: Supple, No JVD, Normal thyroid  NERVOUS SYSTEM:  Alert  Good concentration; Motor Strength 5/5 B/L upper and lower extremities  CHEST/LUNG: Few bas rales, rhonchi, No wheezing, or rubs  Good excursion  HEART: Regular rate and rhythm; No murmurs, rubs, or gallops  ABDOMEN: Soft, Nontender, Nondistended; Bowel sounds present  EXTREMITIES:  2+ Peripheral Pulses, No clubbing, cyanosis, or edema : left bka  LYMPH: No lymphadenopathy noted  SKIN: No rashes or lesions            LABS:                        8.4    5.5   )-----------( 172      ( 05 Dec 2017 06:54 )             27.2     12-05    147<H>  |  108  |  62<H>  ----------------------------<  113<H>  4.8   |  22  |  5.18<H>    Ca    9.0      05 Dec 2017 06:54    TPro  7.1  /  Alb  2.7<L>  /  TBili  0.5  /  DBili  x   /  AST  15  /  ALT  16  /  AlkPhos  85  12-05          vanco through     RADIOLOGY & ADDITIONAL STUDIES:      CRITICAL CARE TIME SPENT:

## 2017-12-06 NOTE — PROGRESS NOTE ADULT - PROBLEM SELECTOR PROBLEM 6
Anemia due to chronic kidney disease
ETOH Abuse
CKD (chronic kidney disease) stage 5, GFR less than 15 ml/min
CKD (chronic kidney disease) stage 5, GFR less than 15 ml/min
EDWIN (acute kidney injury)
EDWIN (acute kidney injury)
ETOH Abuse
ETOH Abuse
EDWIN (acute kidney injury)

## 2017-12-06 NOTE — PROGRESS NOTE ADULT - PROBLEM SELECTOR PLAN 1
c/w iv zosyn for pneumonia.  keep sats>90%.  continues to require 4-5 liters supplemental oxygen.  CT chest shows Bilateral pleural effusions with associated airspace disease-atelectasis   not significantly changed since prior exam. Airspace disease right upper   lobe-right middle lobe shows interval improvement since prior CT study.   Global cardiomegaly present.   plan for thoracentesis later today hopefully.  appreciate PULM input.

## 2017-12-06 NOTE — PROGRESS NOTE ADULT - SUBJECTIVE AND OBJECTIVE BOX
Patient is a 62y old  Male who presents with a chief complaint of c/o SOB, respiratory distress, s/p cardiopulmonary arrest (30 Nov 2017 10:32)       Pt is seen and examined  pt is awake and lying in bed, remains in icu and is monitored per icu/[place] policy  pt seems comfortable and denies any complaints at this time    HPI:  61 y/o male with PMH of PVD s/p left BKA, ETOH, ? COPD, ? ckd was sent from [facility] for evaluation of low Pulse ox.  Pt had cardiopulmonary arrest in ED due to acute respiratory failure. .Pt is intubated, +coker cath. ? aspiration - empirically on antibiotics. elevated lactate4.8 on admission which is trending down.  High BNP. CXR -right sided. patchy opacification. ECHO- normal as per cardiology. unequal pupils- right- 4mmand left 2mm. CAT head- no acute event. elevated BNP. (30 Nov 2017 10:32)           MEDICATIONS  (STANDING):  ALBUTerol/ipratropium for Nebulization 3 milliLiter(s) Nebulizer every 6 hours  amLODIPine   Tablet 5 milliGRAM(s) Oral daily  buDESOnide 160 MICROgram(s)/formoterol 4.5 MICROgram(s) Inhaler 2 Puff(s) Inhalation two times a day  dextrose 5%. 1000 milliLiter(s) (50 mL/Hr) IV Continuous <Continuous>  dextrose 50% Injectable 12.5 Gram(s) IV Push once  dextrose 50% Injectable 25 Gram(s) IV Push once  dextrose 50% Injectable 25 Gram(s) IV Push once  epoetin flower Injectable 42911 Unit(s) SubCutaneous <User Schedule>  ferrous    sulfate 325 milliGRAM(s) Oral daily  furosemide    Tablet 40 milliGRAM(s) Oral two times a day  heparin  Injectable 5000 Unit(s) SubCutaneous every 8 hours  influenza   Vaccine 0.5 milliLiter(s) IntraMuscular once  insulin lispro (HumaLOG) corrective regimen sliding scale   SubCutaneous Before meals and at bedtime  metoprolol     tartrate 25 milliGRAM(s) Oral every 8 hours  pantoprazole  Injectable 40 milliGRAM(s) IV Push daily  piperacillin/tazobactam IVPB. 3.375 Gram(s) IV Intermittent every 12 hours    MEDICATIONS  (PRN):  dextrose Gel 1 Dose(s) Oral once PRN Blood Glucose LESS THAN 70 milliGRAM(s)/deciliter  glucagon  Injectable 1 milliGRAM(s) IntraMuscular once PRN Glucose LESS THAN 70 milligrams/deciliter      Allergies    No Known Allergies    Intolerances        Vital Signs Last 24 Hrs  T(C): 36.6 (06 Dec 2017 15:58), Max: 36.9 (05 Dec 2017 20:01)  T(F): 97.8 (06 Dec 2017 15:58), Max: 98.5 (05 Dec 2017 20:01)  HR: 72 (06 Dec 2017 14:00) (59 - 76)  BP: 169/81 (06 Dec 2017 12:00) (105/67 - 174/75)  BP(mean): 106 (06 Dec 2017 12:00) (78 - 106)  RR: 19 (06 Dec 2017 14:00) (13 - 21)  SpO2: 93% (06 Dec 2017 14:00) (85% - 100%)    PHYSICAL EXAM  General: adult in NAD  HEENT: clear oropharynx, anicteric sclera, pink conjunctiva  Neck: supple  CV: normal S1/S2 with no murmur rubs or gallops  Lungs: positive air movement b/l ant lungs,clear to auscultation, no wheezes, no rales  Abdomen: soft non-tender non-distended, no hepatosplenomegaly  Ext: no clubbing cyanosis or edema  Skin: no rashes and no petechiae  Neuro: alert and oriented X 4, no focal deficits  LABS:                          7.7    4.4   )-----------( 154      ( 06 Dec 2017 06:52 )             23.1         Mean Cell Volume : 92.8 fl  Mean Cell Hemoglobin : 31.0 pg  Mean Cell Hemoglobin Concentration : 33.4 gm/dL  Auto Neutrophil # : x  Auto Lymphocyte # : x  Auto Monocyte # : x  Auto Eosinophil # : x  Auto Basophil # : x  Auto Neutrophil % : x  Auto Lymphocyte % : x  Auto Monocyte % : x  Auto Eosinophil % : x  Auto Basophil % : x    Serial CBC's  12-06 @ 06:52  Hct-23.1 / Hgb-7.7 / Plat-154 / RBC-2.49 / WBC-4.4          Serial CBC's  12-05 @ 20:49  Hct--- / Hgb--- / Plat--- / RBC-2.78 / WBC---          Serial CBC's  12-05 @ 06:54  Hct-27.2 / Hgb-8.4 / Plat-172 / RBC-2.87 / WBC-5.5          Serial CBC's  12-04 @ 06:41  Hct-22.5 / Hgb-7.5 / Plat-141 / RBC-2.40 / WBC-5.6          Serial CBC's  12-03 @ 06:15  Hct-22.9 / Hgb-7.6 / Plat-121 / RBC-2.41 / WBC-6.4            12-06    146<H>  |  109<H>  |  55<H>  ----------------------------<  128<H>  4.3   |  23  |  5.02<H>    Ca    8.0<L>      06 Dec 2017 11:38    TPro  6.1  /  Alb  3.1<L>  /  TBili  x   /  DBili  x   /  AST  x   /  ALT  x   /  AlkPhos  x   12-05          Reticulocyte Percent: 1.8 % (12-05-17 @ 20:49)  Vitamin B12, Serum: 1711 pg/mL (12-05-17 @ 11:51)  Folate, Serum: 11.9 ng/mL (12-05-17 @ 11:51)  Ferritin, Serum: 173.0 ng/mL (12-04-17 @ 11:31)  Iron - Total Binding Capacity.: 177 ug/dL (12-04-17 @ 11:31)      Serum Protein Electrophoresis Interp: Normal Electrophoresis Pattern (12-05-17 @ 20:47)            BLOOD SMEAR INTERPRETATION:   Normal WBC series and morphology, no apparent blast cells or immature wbc, no schistocytes or fragmented rbc, platelets appears adequate, no clumping of platelets or giant platelets.

## 2017-12-06 NOTE — PROGRESS NOTE ADULT - PROBLEM SELECTOR PLAN 1
Continue to observe on NIV. May transition to high flow NC due to patient agitation   -HOB 30 degrees   -aggressive chest PT and suctioning  DNR.  Try nasal O2

## 2017-12-06 NOTE — PROGRESS NOTE ADULT - SUBJECTIVE AND OBJECTIVE BOX
Patient is a 62y old  Male who presents with a chief complaint of c/o SOB, respiratory distress, s/p cardiopulmonary arrest (30 Nov 2017 10:32)      Patient seen in follow up for EDWIN, CKD 4. Pt resting in bed. On oxygen mask. Thoracentesis to be scheduled.     PAST MEDICAL HISTORY:  Peripheral Arterial Disease  Controlled Type 2 Diabetes with Leg or Foot Ulcer  ETOH Abuse    MEDICATIONS  (STANDING):  ALBUTerol/ipratropium for Nebulization 3 milliLiter(s) Nebulizer every 6 hours  amLODIPine   Tablet 5 milliGRAM(s) Oral daily  buDESOnide 160 MICROgram(s)/formoterol 4.5 MICROgram(s) Inhaler 2 Puff(s) Inhalation two times a day  dextrose 5%. 1000 milliLiter(s) (50 mL/Hr) IV Continuous <Continuous>  dextrose 50% Injectable 12.5 Gram(s) IV Push once  dextrose 50% Injectable 25 Gram(s) IV Push once  dextrose 50% Injectable 25 Gram(s) IV Push once  epoetin flower Injectable 21167 Unit(s) SubCutaneous <User Schedule>  ferrous    sulfate 325 milliGRAM(s) Oral daily  furosemide    Tablet 40 milliGRAM(s) Oral daily  heparin  Injectable 5000 Unit(s) SubCutaneous every 8 hours  influenza   Vaccine 0.5 milliLiter(s) IntraMuscular once  insulin lispro (HumaLOG) corrective regimen sliding scale   SubCutaneous Before meals and at bedtime  metoprolol     tartrate 25 milliGRAM(s) Oral every 8 hours  pantoprazole  Injectable 40 milliGRAM(s) IV Push daily  piperacillin/tazobactam IVPB. 3.375 Gram(s) IV Intermittent every 12 hours    MEDICATIONS  (PRN):  dextrose Gel 1 Dose(s) Oral once PRN Blood Glucose LESS THAN 70 milliGRAM(s)/deciliter  glucagon  Injectable 1 milliGRAM(s) IntraMuscular once PRN Glucose LESS THAN 70 milligrams/deciliter    T(C): 36.6 (12-06-17 @ 12:35), Max: 37 (12-05-17 @ 12:01)  HR: 72 (12-06-17 @ 14:00) (55 - 76)  BP: 169/81 (12-06-17 @ 12:00) (94/61 - 174/75)  RR: 19 (12-06-17 @ 14:00)  SpO2: 93% (12-06-17 @ 14:00)  Wt(kg): --  I&O's Detail    05 Dec 2017 07:01  -  06 Dec 2017 07:00  --------------------------------------------------------  IN:    IV PiggyBack: 200 mL    Oral Fluid: 200 mL  Total IN: 400 mL    OUT:    Voided: 475 mL  Total OUT: 475 mL    Total NET: -75 mL      06 Dec 2017 07:01  -  06 Dec 2017 15:00  --------------------------------------------------------  IN:    Oral Fluid: 200 mL  Total IN: 200 mL    OUT:    Voided: 360 mL  Total OUT: 360 mL    Total NET: -160 mL          PHYSICAL EXAM:  General: On oxygen mask  Respiratory: b/l air entry  Cardiovascular: S1 S2  Gastrointestinal: soft  Extremities: left BKA.           LABORATORY:                        7.7    4.4   )-----------( 154      ( 06 Dec 2017 06:52 )             23.1     12-06    146<H>  |  109<H>  |  55<H>  ----------------------------<  128<H>  4.3   |  23  |  5.02<H>    Ca    8.0<L>      06 Dec 2017 11:38    TPro  6.1  /  Alb  3.1<L>  /  TBili  x   /  DBili  x   /  AST  x   /  ALT  x   /  AlkPhos  x   12-05    Sodium, Serum: 146 mmol/L (12-06 @ 11:38)  Sodium, Serum: 147 mmol/L (12-05 @ 06:54)    Potassium, Serum: 4.3 mmol/L (12-06 @ 11:38)  Potassium, Serum: 4.8 mmol/L (12-05 @ 06:54)    Hemoglobin: 7.7 g/dL (12-06 @ 06:52)  Hemoglobin: 8.4 g/dL (12-05 @ 06:54)  Hemoglobin: 7.5 g/dL (12-04 @ 06:41)    Creatinine, Serum 5.02 (12-06 @ 11:38)  Creatinine, Serum 5.18 (12-05 @ 06:54)  Creatinine, Serum 5.01 (12-04 @ 06:41)        LIVER FUNCTIONS - ( 05 Dec 2017 20:47 )  Alb: 3.1 g/dL / Pro: 6.1 g/dL / ALK PHOS: x     / ALT: x     / AST: x     / GGT: x

## 2017-12-06 NOTE — PROGRESS NOTE ADULT - ASSESSMENT
63 y/o male with PMH of PVD s/p left BKA, ETOH, ? COPD, ? ckd was sent from [facility] for evaluation of low Pulse ox.  Pt had cardiopulmonary arrest in ED due to acute respiratory failure. .Pt was intubated, now extubated.

## 2017-12-06 NOTE — PROGRESS NOTE ADULT - SUBJECTIVE AND OBJECTIVE BOX
Patient is a 62y old  Male who presents with a chief complaint of c/o SOB, respiratory distress, s/p cardiopulmonary arrest (30 Nov 2017 10:32)      INTERVAL History of Present Illness/OVERNIGHT EVENTS: oxygen dependent 4-5 liters.  CT chest shows Bilateral pleural effusions with associated airspace disease-atelectasis   not significantly changed since prior exam. Airspace disease right upper   lobe-right middle lobe shows interval improvement since prior CT study.   Global cardiomegaly present.     extensive d/w pulm/renal - plan to perform thoracentesis later today hopefully.  dialysis per se would not help with effusions directly.    MEDICATIONS  (STANDING):  ALBUTerol/ipratropium for Nebulization 3 milliLiter(s) Nebulizer every 6 hours  amLODIPine   Tablet 5 milliGRAM(s) Oral daily  buDESOnide 160 MICROgram(s)/formoterol 4.5 MICROgram(s) Inhaler 2 Puff(s) Inhalation two times a day  dextrose 5%. 1000 milliLiter(s) (50 mL/Hr) IV Continuous <Continuous>  dextrose 50% Injectable 12.5 Gram(s) IV Push once  dextrose 50% Injectable 25 Gram(s) IV Push once  dextrose 50% Injectable 25 Gram(s) IV Push once  epoetin flower Injectable 97232 Unit(s) SubCutaneous <User Schedule>  ferrous    sulfate 325 milliGRAM(s) Oral daily  furosemide    Tablet 40 milliGRAM(s) Oral daily  heparin  Injectable 5000 Unit(s) SubCutaneous every 8 hours  influenza   Vaccine 0.5 milliLiter(s) IntraMuscular once  insulin lispro (HumaLOG) corrective regimen sliding scale   SubCutaneous Before meals and at bedtime  metoprolol     tartrate 25 milliGRAM(s) Oral every 8 hours  pantoprazole  Injectable 40 milliGRAM(s) IV Push daily  piperacillin/tazobactam IVPB. 3.375 Gram(s) IV Intermittent every 12 hours    MEDICATIONS  (PRN):  dextrose Gel 1 Dose(s) Oral once PRN Blood Glucose LESS THAN 70 milliGRAM(s)/deciliter  glucagon  Injectable 1 milliGRAM(s) IntraMuscular once PRN Glucose LESS THAN 70 milligrams/deciliter      Allergies    No Known Allergies    Intolerances        REVIEW OF SYSTEMS:  Negative unless otherwise specified above.    Vital Signs Last 24 Hrs  T(C): 36.3 (06 Dec 2017 08:02), Max: 37 (05 Dec 2017 12:01)  T(F): 97.4 (06 Dec 2017 08:02), Max: 98.6 (05 Dec 2017 12:01)  HR: 67 (06 Dec 2017 10:07) (59 - 76)  BP: 135/73 (06 Dec 2017 10:07) (105/67 - 174/75)  BP(mean): 92 (06 Dec 2017 10:07) (78 - 102)  RR: 15 (06 Dec 2017 10:07) (13 - 26)  SpO2: 93% (06 Dec 2017 10:07) (85% - 100%)        PHYSICAL EXAM:  GENERAL: No apparent distress  HEAD:  Atraumatic, Normocephalic  EYES: conjunctiva and sclera clear  ENMT: Moist mucous membranes  NECK: Supple, No Jugular venous distension  CHEST/LUNG: diminished breath sounds at bases  HEART: Regular rate and rhythm  ABDOMEN: Soft, Nontender, Nondistended; Bowel sounds present  EXTREMITIES:  left BKA  SKIN: No rashes or lesions  NERVOUS SYSTEM:  Alert & Oriented X3, Good concentration; Bilateral Lower extremity mobile, sensation to light touch intact      LABS:                        7.7    4.4   )-----------( 154      ( 06 Dec 2017 06:52 )             23.1       Ca    9.0        05 Dec 2017 06:54    TPro  6.1    /  Alb  3.1    /  TBili  x      /  DBili  x      /  AST  x      /  ALT  x      /  AlkPhos  x      05 Dec 2017 20:47        CAPILLARY BLOOD GLUCOSE      POCT Blood Glucose.: 132 mg/dL (06 Dec 2017 07:40)  POCT Blood Glucose.: 183 mg/dL (05 Dec 2017 21:26)  POCT Blood Glucose.: 229 mg/dL (05 Dec 2017 16:04)      RADIOLOGY & ADDITIONAL TESTS:    Images reviewed personally    Consultant Notes Reviewed and Care Discussed with relevant Consultants/Other Providers.

## 2017-12-06 NOTE — PROGRESS NOTE ADULT - SUBJECTIVE AND OBJECTIVE BOX
Progress Note:   · Provider Specialty	Cardiology	      · Subjective and Objective: 	    REASON FOR VISIT: S/p cardiac arrest    HPI: 62 year old man with a history of chronic illness, severe PVD s/p left BKA, anemia, peripheral neuropathy, uncontrolled DM, etOH abuse transferred from his long term care facility for the evaluation of hypoxia; now s/p asystolic arrest in ED due to hypoxic respiratory failure; also with renal failure.    12/1/17:  Sedated on vent; more responsive per RN.  12/2/17: extubated and breathing better but still SOB and sating in high 80's to low 90's.  12/3/17: resting and sleeping comfortably, Sat in 92% range.    12/4/17  patient is feeling better , denies any sob , patient blood pressure is better ,     12/06/17 - No chest pain, dizziness. Breathing comfortably with O2 mask in oplace.     PAST MEDICAL & SURGICAL HISTORY:  Peripheral Arterial Httemfn51/06/17 -   Controlled Type 2 Diabetes with Leg or Foot Ulcer  ETOH Abuse  Amputation, Below Knee, Unilateral, Traumatic      Allergies    No Known Allergies      MEDICATIONS:    MEDICATIONS  (STANDING):  ALBUTerol/ipratropium for Nebulization 3 milliLiter(s) Nebulizer every 6 hours  amLODIPine   Tablet 5 milliGRAM(s) Oral daily  buDESOnide 160 MICROgram(s)/formoterol 4.5 MICROgram(s) Inhaler 2 Puff(s) Inhalation two times a day  dextrose 5%. 1000 milliLiter(s) (50 mL/Hr) IV Continuous <Continuous>  dextrose 50% Injectable 12.5 Gram(s) IV Push once  dextrose 50% Injectable 25 Gram(s) IV Push once  dextrose 50% Injectable 25 Gram(s) IV Push once  epoetin flower Injectable 11267 Unit(s) SubCutaneous <User Schedule>  ferrous    sulfate 325 milliGRAM(s) Oral daily  furosemide    Tablet 40 milliGRAM(s) Oral daily  heparin  Injectable 5000 Unit(s) SubCutaneous every 8 hours  influenza   Vaccine 0.5 milliLiter(s) IntraMuscular once  insulin lispro (HumaLOG) corrective regimen sliding scale   SubCutaneous Before meals and at bedtime  metoprolol     tartrate 25 milliGRAM(s) Oral every 8 hours  pantoprazole  Injectable 40 milliGRAM(s) IV Push daily  piperacillin/tazobactam IVPB. 3.375 Gram(s) IV Intermittent every 12 hours    MEDICATIONS  (PRN):  dextrose Gel 1 Dose(s) Oral once PRN Blood Glucose LESS THAN 70 milliGRAM(s)/deciliter  glucagon  Injectable 1 milliGRAM(s) IntraMuscular once PRN Glucose LESS THAN 70 milligrams/deciliter        Vital Signs Last 24 Hrs  T(C): 36.6 (06 Dec 2017 04:16), Max: 37 (05 Dec 2017 12:01)  T(F): 97.8 (06 Dec 2017 04:16), Max: 98.6 (05 Dec 2017 12:01)  HR: 59 (06 Dec 2017 07:17) (59 - 76)  BP: 174/75 (06 Dec 2017 06:05) (147/57 - 174/75)  BP(mean): 102 (06 Dec 2017 06:05) (82 - 102)  RR: 13 (06 Dec 2017 06:05) (13 - 26)  SpO2: 100% (06 Dec 2017 07:17) (85% - 100%)    I&O's Summary    05 Dec 2017 07:01  -  06 Dec 2017 07:00  --------------------------------------------------------  IN: 400 mL / OUT: 475 mL / NET: -75 mL        PHYSICAL EXAM:    Constitutional: NAD, awake and alert, well-developed  HEENT: NC; no oral cyanosis.   Respiratory: Breath sounds are decreased at the bases. Rare rhonchi  Cardiovascular: S1 and S2, regular rhythm, + systolic murmur base.   Gastrointestinal: Bowel Sounds present, soft, nontender.   Extremities: No peripheral edema. No clubbing or cyanosis. + BKA left.  Neurological: Awake and alert.   Musculoskeletal: no calf tenderness ( right).   Skin: No rashes.      LABS: All Labs Reviewed:                        7.7    4.4   )-----------( 154      ( 06 Dec 2017 06:52 )             23.1                         8.4    5.5   )-----------( 172      ( 05 Dec 2017 06:54 )             27.2                         7.5    5.6   )-----------( 141      ( 04 Dec 2017 06:41 )             22.5     05 Dec 2017 06:54    147    |  108    |  62     ----------------------------<  113    4.8     |  22     |  5.18   04 Dec 2017 06:41    145    |  109    |  59     ----------------------------<  139    4.6     |  23     |  5.01     Ca    9.0        05 Dec 2017 06:54  Ca    8.4        04 Dec 2017 06:41    TPro  7.1    /  Alb  2.7    /  TBili  0.5    /  DBili  x      /  AST  15     /  ALT  16     /  AlkPhos  85     05 Dec 2017 06:54        RADIOLOGY/EKG:    EXAM:  CHEST PORTABLE ROUTINE                                  PROCEDURE DATE:  12/05/2017          INTERPRETATION:  Clinical information: Pneumonia    Portable exam, 5:37 AM.    Comparison study dated 12/4/2017.    Cardiac monitor leads overlie the chest.  Worsening appearance of the chest since the prior exam. Bibasilar   airspace disease, right greater than left. The right hemidiaphragm is   obscured on the current study. Left hemidiaphragm is also obscured not   significantly changed since prior exam.    Heart size appears enlarged, magnified secondary to portable technique.   Aortic knob contains calcifications. No acute osseous abnormalities.    IMPRESSION: See above report.      SARAN BREWSTER M.D.,ATTENDING RADIOLOGIST  This document has been electronically signed. Dec  5 2017  8:43AM        CONCLUSIONS:  1. Normal left ventricular size and function. LVEF estimated at 65-70%.  2. Normal right ventricular size and function.  3. Minimal prolapse of the anterior mitral leaflet. Mild mitral stenosis.    Minimal mitral regurgitation.  4. Mild aortic stenosis.  5. Mild tricuspid regurgitation.         HELGA GEORGE M.D., ATTENDING CARDIOLOGIST  This document has been electronically signed. Nov 30 2017 10:40AM    rhythm - sinus

## 2017-12-06 NOTE — PROGRESS NOTE ADULT - ATTENDING COMMENTS
discharge to rehab once hypoxia improved.
poor prognosis.  GOC discussion today/tomorrow.
physical therapy.

## 2017-12-07 ENCOUNTER — RESULT REVIEW (OUTPATIENT)
Age: 62
End: 2017-12-07

## 2017-12-07 LAB
ALBUMIN FLD-MCNC: 1.4 G/DL — SIGNIFICANT CHANGE UP
ANION GAP SERPL CALC-SCNC: 13 MMOL/L — SIGNIFICANT CHANGE UP (ref 5–17)
B PERT IGG+IGM PNL SER: CLEAR — SIGNIFICANT CHANGE UP
BUN SERPL-MCNC: 53 MG/DL — HIGH (ref 7–23)
CALCIUM SERPL-MCNC: 8.8 MG/DL — SIGNIFICANT CHANGE UP (ref 8.4–10.5)
CHLORIDE SERPL-SCNC: 107 MMOL/L — SIGNIFICANT CHANGE UP (ref 96–108)
CO2 SERPL-SCNC: 25 MMOL/L — SIGNIFICANT CHANGE UP (ref 22–31)
COLOR FLD: YELLOW — SIGNIFICANT CHANGE UP
CREAT SERPL-MCNC: 4.9 MG/DL — HIGH (ref 0.5–1.3)
FLUID INTAKE SUBSTANCE CLASS: SIGNIFICANT CHANGE UP
FLUID SEGMENTED GRANULOCYTES: 17 % — SIGNIFICANT CHANGE UP
GLUCOSE BLDC GLUCOMTR-MCNC: 135 MG/DL — HIGH (ref 70–99)
GLUCOSE BLDC GLUCOMTR-MCNC: 140 MG/DL — HIGH (ref 70–99)
GLUCOSE BLDC GLUCOMTR-MCNC: 169 MG/DL — HIGH (ref 70–99)
GLUCOSE BLDC GLUCOMTR-MCNC: 203 MG/DL — HIGH (ref 70–99)
GLUCOSE FLD-MCNC: 148 MG/DL — SIGNIFICANT CHANGE UP
GLUCOSE SERPL-MCNC: 145 MG/DL — HIGH (ref 70–99)
GRAM STN FLD: SIGNIFICANT CHANGE UP
HCT VFR BLD CALC: 25.1 % — LOW (ref 39–50)
HGB BLD-MCNC: 8.1 G/DL — LOW (ref 13–17)
LDH SERPL L TO P-CCNC: 78 U/L — SIGNIFICANT CHANGE UP
LYMPHOCYTES # FLD: 41 % — SIGNIFICANT CHANGE UP
MCHC RBC-ENTMCNC: 30.2 PG — SIGNIFICANT CHANGE UP (ref 27–34)
MCHC RBC-ENTMCNC: 32.3 GM/DL — SIGNIFICANT CHANGE UP (ref 32–36)
MCV RBC AUTO: 93.5 FL — SIGNIFICANT CHANGE UP (ref 80–100)
MESOTHL CELL # FLD: 10 % — SIGNIFICANT CHANGE UP
MONOS+MACROS # FLD: 32 % — SIGNIFICANT CHANGE UP
PH FLD: 7.6 — SIGNIFICANT CHANGE UP
PLATELET # BLD AUTO: 171 K/UL — SIGNIFICANT CHANGE UP (ref 150–400)
POTASSIUM SERPL-MCNC: 4.4 MMOL/L — SIGNIFICANT CHANGE UP (ref 3.5–5.3)
POTASSIUM SERPL-SCNC: 4.4 MMOL/L — SIGNIFICANT CHANGE UP (ref 3.5–5.3)
PROT FLD-MCNC: 1.8 G/DL — SIGNIFICANT CHANGE UP
RBC # BLD: 2.68 M/UL — LOW (ref 4.2–5.8)
RBC # FLD: 13.7 % — SIGNIFICANT CHANGE UP (ref 10.3–14.5)
RCV VOL RI: 44 /UL — HIGH (ref 0–5)
SODIUM SERPL-SCNC: 145 MMOL/L — SIGNIFICANT CHANGE UP (ref 135–145)
SPECIMEN SOURCE: SIGNIFICANT CHANGE UP
TOTAL NUCLEATED CELL COUNT, BODY FLUID: 153 /UL — HIGH (ref 0–5)
TUBE TYPE: SIGNIFICANT CHANGE UP
WBC # BLD: 4 K/UL — SIGNIFICANT CHANGE UP (ref 3.8–10.5)
WBC # FLD AUTO: 4 K/UL — SIGNIFICANT CHANGE UP (ref 3.8–10.5)

## 2017-12-07 PROCEDURE — 88305 TISSUE EXAM BY PATHOLOGIST: CPT | Mod: 26

## 2017-12-07 PROCEDURE — 99233 SBSQ HOSP IP/OBS HIGH 50: CPT

## 2017-12-07 PROCEDURE — 88108 CYTOPATH CONCENTRATE TECH: CPT | Mod: 26

## 2017-12-07 PROCEDURE — 99232 SBSQ HOSP IP/OBS MODERATE 35: CPT

## 2017-12-07 PROCEDURE — 32555 ASPIRATE PLEURA W/ IMAGING: CPT | Mod: 50

## 2017-12-07 PROCEDURE — 71010: CPT | Mod: 26

## 2017-12-07 PROCEDURE — 99497 ADVNCD CARE PLAN 30 MIN: CPT | Mod: 25

## 2017-12-07 RX ADMIN — Medication 25 MILLIGRAM(S): at 21:25

## 2017-12-07 RX ADMIN — Medication 3 MILLILITER(S): at 07:47

## 2017-12-07 RX ADMIN — Medication 3 MILLILITER(S): at 20:04

## 2017-12-07 RX ADMIN — Medication 3 MILLILITER(S): at 00:05

## 2017-12-07 RX ADMIN — Medication 325 MILLIGRAM(S): at 11:40

## 2017-12-07 RX ADMIN — Medication 25 MILLIGRAM(S): at 14:46

## 2017-12-07 RX ADMIN — Medication 40 MILLIGRAM(S): at 17:48

## 2017-12-07 RX ADMIN — Medication 25 MILLIGRAM(S): at 06:34

## 2017-12-07 RX ADMIN — AMLODIPINE BESYLATE 5 MILLIGRAM(S): 2.5 TABLET ORAL at 06:34

## 2017-12-07 RX ADMIN — BUDESONIDE AND FORMOTEROL FUMARATE DIHYDRATE 2 PUFF(S): 160; 4.5 AEROSOL RESPIRATORY (INHALATION) at 06:34

## 2017-12-07 RX ADMIN — Medication 1: at 16:45

## 2017-12-07 RX ADMIN — Medication 40 MILLIGRAM(S): at 06:34

## 2017-12-07 RX ADMIN — HEPARIN SODIUM 5000 UNIT(S): 5000 INJECTION INTRAVENOUS; SUBCUTANEOUS at 21:25

## 2017-12-07 RX ADMIN — BUDESONIDE AND FORMOTEROL FUMARATE DIHYDRATE 2 PUFF(S): 160; 4.5 AEROSOL RESPIRATORY (INHALATION) at 17:48

## 2017-12-07 RX ADMIN — Medication 2: at 21:30

## 2017-12-07 RX ADMIN — PANTOPRAZOLE SODIUM 40 MILLIGRAM(S): 20 TABLET, DELAYED RELEASE ORAL at 11:40

## 2017-12-07 NOTE — PROGRESS NOTE ADULT - SUBJECTIVE AND OBJECTIVE BOX
Neurology Follow up note    JP SANDOVAL 62y Male    HPI:  61 y/o male with PMH of PVD s/p left BKA, ETOH, ? COPD, ? ckd was sent from [facility] for evaluation of low Pulse ox.  Pt had cardiopulmonary arrest in ED due to acute respiratory failure. .Pt is intubated, +coker cath. ? aspiration - empirically on antibiotics. elevated lactate4.8 on admission which is trending down.  High BNP. CXR -right sided. patchy opacification. ECHO- normal as per cardiology. unequal pupils- right- 4mmand left 2mm. CAT head- no acute event. elevated BNP. (30 Nov 2017 10:32)    Interval History -    Patient is seen, chart was reviewed and case was discussed with the treatment team.  Pt is not in any distress.   Lying on bed comfortably.     Vital Signs Last 24 Hrs  T(C): 36.6 (07 Dec 2017 08:13), Max: 37.1 (06 Dec 2017 20:11)  T(F): 97.8 (07 Dec 2017 08:13), Max: 98.7 (06 Dec 2017 20:11)  HR: 65 (07 Dec 2017 08:00) (57 - 74)  BP: 129/109 (07 Dec 2017 08:00) (129/109 - 178/64)  BP(mean): 116 (07 Dec 2017 08:00) (89 - 116)  RR: 13 (07 Dec 2017 08:00) (13 - 19)  SpO2: 94% (07 Dec 2017 08:00) (89% - 98%)    REVIEW OF SYSTEMS:    CONSTITUTIONAL: No fever  EYES: No eye pain,   ENMT:  Extubated  NECK: Extubated  RESPIRATORY: Extubated  CARDIOVASCULAR: No acute chest pain, palpitations,  or leg swelling  GASTROINTESTINAL: No abdominal pain. No nausea, vomiting, or hematemesis;  No melena or hematochezia.  GENITOURINARY: No  hematuria, or incontinence  MUSCULOSKELETAL: H/o Left BKA.  SKIN: No itching, rashes, or lesions   LYMPH NODES: No enlarged glands  NEUROLOGICAL: No headaches, memory loss,   PSYCHIATRIC: No depression, anxiety, mood swings, or difficulty sleeping  ENDOCRINE: No heat or cold intolerance;   HEME/LYMPH: No easy bruising, or bleeding gums  Allergy/Immunology. No medication allergy. No seasonal allergies.    GENERAL: NAD, well-groomed, well-developed  HEAD:  Atraumatic, Normocephalic  EYES: EOMI, PERRLA, conjunctiva and sclera clear  NECK: Supple, No JVD, thyroid non-palpable    On Neurological Examination:    Awake. Follows commands.   Speaks well.  Pupil 2 mm,  reacting to light - No pupillary asymmetry is seen.  No facial asymmetry.  Moves b/l UE equally.  Left BKA.  Moves B/L LE well.  Neck is supple.     MEDICATIONS    ALBUTerol/ipratropium for Nebulization 3 milliLiter(s) Nebulizer every 6 hours  amLODIPine   Tablet 5 milliGRAM(s) Oral daily  buDESOnide 160 MICROgram(s)/formoterol 4.5 MICROgram(s) Inhaler 2 Puff(s) Inhalation two times a day  dextrose 5%. 1000 milliLiter(s) IV Continuous <Continuous>  dextrose 50% Injectable 12.5 Gram(s) IV Push once  dextrose 50% Injectable 25 Gram(s) IV Push once  dextrose 50% Injectable 25 Gram(s) IV Push once  dextrose Gel 1 Dose(s) Oral once PRN  epoetin flower Injectable 52418 Unit(s) SubCutaneous <User Schedule>  ferrous    sulfate 325 milliGRAM(s) Oral daily  furosemide    Tablet 40 milliGRAM(s) Oral two times a day  glucagon  Injectable 1 milliGRAM(s) IntraMuscular once PRN  heparin  Injectable 5000 Unit(s) SubCutaneous every 8 hours  influenza   Vaccine 0.5 milliLiter(s) IntraMuscular once  insulin lispro (HumaLOG) corrective regimen sliding scale   SubCutaneous Before meals and at bedtime  metoprolol     tartrate 25 milliGRAM(s) Oral every 8 hours  pantoprazole  Injectable 40 milliGRAM(s) IV Push daily      Allergies    No Known Allergies    LABS:    CBC Full  -  ( 07 Dec 2017 06:51 )  WBC Count : 4.0 K/uL  Hemoglobin : 8.1 g/dL  Hematocrit : 25.1 %  Platelet Count - Automated : 171 K/uL  Mean Cell Volume : 93.5 fl  Mean Cell Hemoglobin : 30.2 pg  Mean Cell Hemoglobin Concentration : 32.3 gm/dL    12-07    145  |  107  |  53<H>  ----------------------------<  145<H>  4.4   |  25  |  4.90<H>    Ca    8.8      07 Dec 2017 06:51    TPro  6.1  /  Alb  3.1<L>  /  TBili  x   /  DBili  x   /  AST  x   /  ALT  x   /  AlkPhos  x   12-05    RADIOLOGY    < from: CT Head No Cont (11.30.17 @ 08:24) >  EXAM:  CT BRAIN                          PROCEDURE DATE:  11/30/2017      INTERPRETATION:  Medical information: Cardiac arrest, unequal pupils.    Comparison study dated 5/4/2012    Axial images obtained, coronal and sagittal images computer reformatted.   Patient motion present on some of the images.    Atrophic changes present. Periventricular white matter hypoattenuation,   microvascular ischemic change. 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 Old   left frontal infarct present. Bilateral lacuna infarcts in the basal   ganglion, corona radiata and thalami. No evidence of hemorrhage, mass   effect, midline shift, epidural, or subdural collection.    No unusual calcifications present. Calcifications visible the tentorium.   Calvarium intact. No fluid levels in visualized paranasal sinuses.    IMPRESSION: No evidence of hemorrhage or mass effect.      SARAN BREWSTER M.D.,ATTENDING RADIOLOGIST  This document has been electronically signed. Nov 30 2017  8:37AM        < end of copied text >

## 2017-12-07 NOTE — PROGRESS NOTE ADULT - SUBJECTIVE AND OBJECTIVE BOX
Patient is a 62y old  Male who presents with a chief complaint of c/o SOB, respiratory distress, s/p cardiopulmonary arrest (30 Nov 2017 10:32)      Patient seen in follow up for EWDIN, CKD 4. Pt resting in bed. On oxygen by nasal canula. s/p Thoracentesis    PAST MEDICAL HISTORY:  Peripheral Arterial Disease  Controlled Type 2 Diabetes with Leg or Foot Ulcer  ETOH Abuse    MEDICATIONS  (STANDING):  ALBUTerol/ipratropium for Nebulization 3 milliLiter(s) Nebulizer every 6 hours  amLODIPine   Tablet 5 milliGRAM(s) Oral daily  buDESOnide 160 MICROgram(s)/formoterol 4.5 MICROgram(s) Inhaler 2 Puff(s) Inhalation two times a day  dextrose 5%. 1000 milliLiter(s) (50 mL/Hr) IV Continuous <Continuous>  dextrose 50% Injectable 12.5 Gram(s) IV Push once  dextrose 50% Injectable 25 Gram(s) IV Push once  dextrose 50% Injectable 25 Gram(s) IV Push once  epoetin flower Injectable 17956 Unit(s) SubCutaneous <User Schedule>  ferrous    sulfate 325 milliGRAM(s) Oral daily  furosemide    Tablet 40 milliGRAM(s) Oral two times a day  heparin  Injectable 5000 Unit(s) SubCutaneous every 8 hours  influenza   Vaccine 0.5 milliLiter(s) IntraMuscular once  insulin lispro (HumaLOG) corrective regimen sliding scale   SubCutaneous Before meals and at bedtime  metoprolol     tartrate 25 milliGRAM(s) Oral every 8 hours  pantoprazole  Injectable 40 milliGRAM(s) IV Push daily    MEDICATIONS  (PRN):  dextrose Gel 1 Dose(s) Oral once PRN Blood Glucose LESS THAN 70 milliGRAM(s)/deciliter  glucagon  Injectable 1 milliGRAM(s) IntraMuscular once PRN Glucose LESS THAN 70 milligrams/deciliter    T(C): 36.5 (12-07-17 @ 12:26), Max: 37.1 (12-06-17 @ 20:11)  HR: 64 (12-07-17 @ 14:01) (57 - 76)  BP: 162/71 (12-07-17 @ 14:01) (105/67 - 178/64)  RR: 16 (12-07-17 @ 14:01)  SpO2: 100% (12-07-17 @ 14:01)  Wt(kg): --  I&O's Detail    06 Dec 2017 07:01  -  07 Dec 2017 07:00  --------------------------------------------------------  IN:    Oral Fluid: 620 mL  Total IN: 620 mL    OUT:    Voided: 910 mL  Total OUT: 910 mL    Total NET: -290 mL      07 Dec 2017 07:01  -  07 Dec 2017 15:20  --------------------------------------------------------  IN:  Total IN: 0 mL    OUT:    Voided: 550 mL  Total OUT: 550 mL    Total NET: -550 mL          PHYSICAL EXAM:  General: On oxygen mask  Respiratory: b/l air entry  Cardiovascular: S1 S2  Gastrointestinal: soft  Extremities: left BKA.           LABORATORY:                        8.1    4.0   )-----------( 171      ( 07 Dec 2017 06:51 )             25.1     12-07    145  |  107  |  53<H>  ----------------------------<  145<H>  4.4   |  25  |  4.90<H>    Ca    8.8      07 Dec 2017 06:51    TPro  6.1  /  Alb  3.1<L>  /  TBili  x   /  DBili  x   /  AST  x   /  ALT  x   /  AlkPhos  x   12-05    Sodium, Serum: 145 mmol/L (12-07 @ 06:51)  Sodium, Serum: 146 mmol/L (12-06 @ 11:38)    Potassium, Serum: 4.4 mmol/L (12-07 @ 06:51)  Potassium, Serum: 4.3 mmol/L (12-06 @ 11:38)    Hemoglobin: 8.1 g/dL (12-07 @ 06:51)  Hemoglobin: 7.7 g/dL (12-06 @ 06:52)  Hemoglobin: 8.4 g/dL (12-05 @ 06:54)    Creatinine, Serum 4.90 (12-07 @ 06:51)  Creatinine, Serum 5.02 (12-06 @ 11:38)  Creatinine, Serum 5.18 (12-05 @ 06:54)        LIVER FUNCTIONS - ( 05 Dec 2017 20:47 )  Alb: 3.1 g/dL / Pro: 6.1 g/dL / ALK PHOS: x     / ALT: x     / AST: x     / GGT: x

## 2017-12-07 NOTE — PROGRESS NOTE ADULT - SUBJECTIVE AND OBJECTIVE BOX
Patient is a 62y old  Male who presents with a chief complaint of c/o SOB, respiratory distress, s/p cardiopulmonary arrest (30 Nov 2017 10:32)       Pt is seen and examined, pt is awake and lying in bed, remains in icu and is on monitor bed with hr is at 72/min and bp is at 158/63 this time, pt seems comfortable and denies any complaints at this time.    HPI:  63 y/o male with PMH of PVD s/p left BKA, ETOH, ? COPD, ? ckd was sent from [facility] for evaluation of low Pulse ox.  Pt had cardiopulmonary arrest in ED due to acute respiratory failure. .Pt is intubated, +coker cath. ? aspiration - empirically on antibiotics. elevated lactate4.8 on admission which is trending down.  High BNP. CXR -right sided. patchy opacification. ECHO- normal as per cardiology. unequal pupils- right- 4mmand left 2mm. CAT head- no acute event. elevated BNP. (30 Nov 2017 10:32)         MEDICATIONS  (STANDING):  ALBUTerol/ipratropium for Nebulization 3 milliLiter(s) Nebulizer every 6 hours  amLODIPine   Tablet 5 milliGRAM(s) Oral daily  buDESOnide 160 MICROgram(s)/formoterol 4.5 MICROgram(s) Inhaler 2 Puff(s) Inhalation two times a day  dextrose 5%. 1000 milliLiter(s) (50 mL/Hr) IV Continuous <Continuous>  dextrose 50% Injectable 12.5 Gram(s) IV Push once  dextrose 50% Injectable 25 Gram(s) IV Push once  dextrose 50% Injectable 25 Gram(s) IV Push once  epoetin flower Injectable 93185 Unit(s) SubCutaneous <User Schedule>  ferrous    sulfate 325 milliGRAM(s) Oral daily  furosemide    Tablet 40 milliGRAM(s) Oral two times a day  heparin  Injectable 5000 Unit(s) SubCutaneous every 8 hours  influenza   Vaccine 0.5 milliLiter(s) IntraMuscular once  insulin lispro (HumaLOG) corrective regimen sliding scale   SubCutaneous Before meals and at bedtime  metoprolol     tartrate 25 milliGRAM(s) Oral every 8 hours  pantoprazole  Injectable 40 milliGRAM(s) IV Push daily    MEDICATIONS  (PRN):  dextrose Gel 1 Dose(s) Oral once PRN Blood Glucose LESS THAN 70 milliGRAM(s)/deciliter  glucagon  Injectable 1 milliGRAM(s) IntraMuscular once PRN Glucose LESS THAN 70 milligrams/deciliter      Allergies    No Known Allergies    Intolerances        Vital Signs Last 24 Hrs  T(C): 36.5 (07 Dec 2017 12:26), Max: 37.1 (06 Dec 2017 20:11)  T(F): 97.7 (07 Dec 2017 12:26), Max: 98.7 (06 Dec 2017 20:11)  HR: 64 (07 Dec 2017 14:01) (57 - 74)  BP: 162/71 (07 Dec 2017 14:01) (129/109 - 178/64)  BP(mean): 96 (07 Dec 2017 12:00) (88 - 116)  RR: 16 (07 Dec 2017 14:01) (12 - 18)  SpO2: 100% (07 Dec 2017 14:01) (89% - 100%)    PHYSICAL EXAM  General: adult in NAD  HEENT: clear oropharynx, anicteric sclera, pink conjunctiva  Neck: supple  CV: normal S1/S2 with no murmur rubs or gallops  Lungs: positive air movement b/l ant lungs,clear to auscultation, no wheezes, no rales  Abdomen: soft non-tender non-distended, no hepatosplenomegaly  Ext: no clubbing cyanosis or edema  Skin: no rashes and no petechiae  Neuro: alert and oriented X 4, no focal deficits  LABS:                          8.1    4.0   )-----------( 171      ( 07 Dec 2017 06:51 )             25.1         Mean Cell Volume : 93.5 fl  Mean Cell Hemoglobin : 30.2 pg  Mean Cell Hemoglobin Concentration : 32.3 gm/dL  Auto Neutrophil # : x  Auto Lymphocyte # : x  Auto Monocyte # : x  Auto Eosinophil # : x  Auto Basophil # : x  Auto Neutrophil % : x  Auto Lymphocyte % : x  Auto Monocyte % : x  Auto Eosinophil % : x  Auto Basophil % : x    Serial CBC's  12-07 @ 06:51  Hct-25.1 / Hgb-8.1 / Plat-171 / RBC-2.68 / WBC-4.0          Serial CBC's  12-06 @ 06:52  Hct-23.1 / Hgb-7.7 / Plat-154 / RBC-2.49 / WBC-4.4          Serial CBC's  12-05 @ 20:49  Hct--- / Hgb--- / Plat--- / RBC-2.78 / WBC---          Serial CBC's  12-05 @ 06:54  Hct-27.2 / Hgb-8.4 / Plat-172 / RBC-2.87 / WBC-5.5          Serial CBC's  12-04 @ 06:41  Hct-22.5 / Hgb-7.5 / Plat-141 / RBC-2.40 / WBC-5.6            12-07    145  |  107  |  53<H>  ----------------------------<  145<H>  4.4   |  25  |  4.90<H>    Ca    8.8      07 Dec 2017 06:51    TPro  6.1  /  Alb  3.1<L>  /  TBili  x   /  DBili  x   /  AST  x   /  ALT  x   /  AlkPhos  x   12-05          Reticulocyte Percent: 1.8 % (12-05-17 @ 20:49)  Vitamin B12, Serum: 1711 pg/mL (12-05-17 @ 11:51)  Folate, Serum: 11.9 ng/mL (12-05-17 @ 11:51)  Ferritin, Serum: 173.0 ng/mL (12-04-17 @ 11:31)  Iron - Total Binding Capacity.: 177 ug/dL (12-04-17 @ 11:31)      Serum Protein Electrophoresis Interp: Normal Electrophoresis Pattern (12-05-17 @ 20:47)            BLOOD SMEAR INTERPRETATION:       RADIOLOGY & ADDITIONAL STUDIES:

## 2017-12-07 NOTE — PROGRESS NOTE ADULT - ASSESSMENT
·	EDWIN on CKD 4: ATN  ·	s/p cardiac arrest, on vent, ? Pneumonia  ·	Metaboic acidosis  ·	Hyperkalemia  ·	Diabetes  ·	Anemia    Stable creatinine. On PO lasix. Will continue twice daily. Monitor h/h trend. Procrit rx.   Pulmonary and cardiology follow up. On zosyn. Will follow electrolytes and renal function trend.

## 2017-12-07 NOTE — PROGRESS NOTE ADULT - ASSESSMENT
Seen for AMS/s/p cardiac arrest.   Awake, No focal neuro deficit.  Likely underlying Poor cognition   CT Head - No acute pathology.  No signs of Hypoxic/Ischemic brain injury clinically.  Supportive care.  Safety preacautions.

## 2017-12-07 NOTE — PROGRESS NOTE ADULT - PROBLEM SELECTOR PROBLEM 4
Chronic obstructive pulmonary disease with acute exacerbation
Anemia
Type 2 diabetes mellitus with diabetic chronic kidney disease, unspecified CKD stage, unspecified long term insulin use status
Acute respiratory failure with hypoxia
Acute respiratory failure with hypoxia
Anemia
Anemia
Cardiopulmonary arrest with successful resuscitation
Chronic obstructive pulmonary disease with acute exacerbation
Type 2 diabetes mellitus with diabetic chronic kidney disease, unspecified CKD stage, unspecified long term insulin use status
Anemia
Cardiopulmonary arrest with successful resuscitation
Cardiopulmonary arrest with successful resuscitation
Chronic obstructive pulmonary disease with acute exacerbation
Type 2 diabetes mellitus with diabetic chronic kidney disease, unspecified CKD stage, unspecified long term insulin use status
Chronic obstructive pulmonary disease with acute exacerbation
Type 2 diabetes mellitus with diabetic chronic kidney disease, unspecified CKD stage, unspecified long term insulin use status

## 2017-12-07 NOTE — PROGRESS NOTE ADULT - PROBLEM SELECTOR PLAN 2
Chronic.
Continue Zosyn  f/u culture  Maintain negative fluid balance
CKD  EDWIN  got lasix this am  I and O  prognosis poor  renal follow up  may need hemofiltration and or HD  monitor urine output  replete cici
Chronic.
Continue Zosyn  f/u culture  Maintain negative fluid balance
Continue Zosyn  f/u culture  Maintain negative fluid balance
EDWIN  CKD  on diuretic  I and O  BP control  replete lytes  renal following
EDWIN  CKD  renal following  I and O  replete lytes  diuresis PRN  monitor for volume overload  icu supportive care  decision to dialyze deferred to Renal and Pt
continue antibiotics   aspraiton precautions with head of bed 30 degreees
supportive measures  monitor labs
Chronic.
Continue Zosyn  f/u culture
Continue Zosyn  f/u culture  Maintain negative fluid balance
continue antibiotics    aspraiton precautions with head of bed 30 degreees.
continue broad spectrum coverage for MDR orgamsims including staph aureus and gram negatives.    Follow up sputum cultre the narrow specrtum based on culture  sensitivities
likely aspiration pneumonia.  c/w IV zosyn.  f/u culture reports.
likely aspiration pneumonia.  c/w IV zosyn.  f/u culture reports.
secondary to acute respiratory failure due to pneumonia.  now stable . monitor clinically   cardiology and critical care evaluation from Dr. Alcantara appreciated.
secondary to acute respiratory failure due to pneumonia.  as per plan 1.
c/w bronchodilator.  Pulmonary -/Ainsley - recommendations being followed.
secondary to acute respiratory failure due to pneumonia.  now stable . monitor clinically   cardiology and critical care evaluation from DR Alcantara appreciated.

## 2017-12-07 NOTE — PROGRESS NOTE ADULT - ASSESSMENT
contact #  sister----anel torres  phone # [phone]--was d/w sister on 12/5  pt is 62/m , a resident at nursing home past 10 years, with hx of etoh abuse, with ch anemia, renal insufficiency and is followed by nephrology. Pt was brought in to hospital with resp insufficiency, went into cardiopul arrest, was revived , intubated and extubated, Hematology was consulted on 12/5/17 for anemia.  History per sister ---pt at nh for past 10 yrs, hx etoh abuse  this admission, s/p cardiac arrest--intubation--extubation  sister/hcp refuses invasive hem procedure  pt is on epogen 10 k s/c tiw---m/w/f  pt is on fe qd  monitor h/h--transfuse prbc if symptomatic anemia or if hb is under 7  gi/dvtp per pcp  d/w pt sister on phone on 12/5/17

## 2017-12-07 NOTE — PROGRESS NOTE ADULT - PROBLEM SELECTOR PLAN 4
C/w sliding scale.  Monitor FS Glucose closely.
c/w bronchodilates.  Pulmonary -.
Regular Insulin Slide Scale  Finger sticks Q 6 hours  diet as tolerated  Hemoglobin A1c
likely related to renal failure.  HGB is improving , monitor level
cvs regimen  pulm regimen  renal follow up  pt with volume overload  got lasix this am  I and O  BIPAP for resp distress  prognosis poor  may need transfer to Dialysis Facility
Hold oral hypoglycmeic meds  Regular Insulin Slide Scale  Finger sticks Q 6 hours  Low Carb 1800 Ha Diet when can tolorate  Hemoglobin A1c.
NEBS  chest pt  monitor sat  keep sat > 88 pct
NIPPV prn  s/p thoracentesis  CXR reviewed  cont Diuresis  I and O  o2 support as needed, keep sat > 88 pct  PT
cvs regimen  TTE noted  renal and cardio follow up  replete lytes  serial labs  supportive ICU care  prognosis poor  pt is DNR
likely related to renal failure.  HGB down to 7.  Will call heme consult.
likely related to renal failure.  HGB down to 7.  suggest transfusion to keep HGB above 9.
Hold oral hypoglycmeic meds  Regular Insulin Slide Scale  Finger sticks Q 6 hours  Low Carb 1800 Ha Diet when can tolorate  Hemoglobin A1c
Hold oral hypoglycmeic meds  Regular Insulin Slide Scale  Finger sticks Q 6 hours  Low Carb 1800 Ha Diet when can tolorate  Hemoglobin A1c.
Regular Insulin Slide Scale  Finger sticks Q 6 hours  diet as tolerated  Hemoglobin A1c
c/w bronchodilator.  Pulmonary -.
likely related to renal failure.  HGB down to 7.  Will call heme consult.
secondary to acute respiratory failure due to pneumonia.  now stable . monitor clinically
secondary to acute respiratory failure due to pneumonia.  now stable . monitor clinically
c/w bronchodilates.  Pulmonary -.

## 2017-12-07 NOTE — PROGRESS NOTE ADULT - PROBLEM SELECTOR PLAN 1
likely multifactorial, most likely anemia secondary to renal , nl spep, other etiologies cannot be excluded, pt sister/hcp declines invasive hem procedure  h/h low but stable  pt is on epogen 10,000 units s/c tiw  pt is on po fe qd  monitor h/h--transfuse prbc if hb is under 7 or symptomatic anemia  gi/dvtp---heparin s/c  d/w pt at bedside.

## 2017-12-07 NOTE — PROGRESS NOTE ADULT - SUBJECTIVE AND OBJECTIVE BOX
Date/Time Patient Seen:  		  Referring MD:   Data Reviewed	       Patient is a 62y old  Male who presents with a chief complaint of c/o SOB, respiratory distress, s/p cardiopulmonary arrest (30 Nov 2017 10:32)    in bed  seen and examined  vs and meds reviewed      Subjective/HPI     PAST MEDICAL & SURGICAL HISTORY:  Peripheral Arterial Disease  Controlled Type 2 Diabetes with Leg or Foot Ulcer  ETOH Abuse  Amputation, Below Knee, Unilateral, Traumatic  Amputation, Below Elbow, Unilateral, Traumatic        Medication list         MEDICATIONS  (STANDING):  ALBUTerol/ipratropium for Nebulization 3 milliLiter(s) Nebulizer every 6 hours  amLODIPine   Tablet 5 milliGRAM(s) Oral daily  buDESOnide 160 MICROgram(s)/formoterol 4.5 MICROgram(s) Inhaler 2 Puff(s) Inhalation two times a day  dextrose 5%. 1000 milliLiter(s) (50 mL/Hr) IV Continuous <Continuous>  dextrose 50% Injectable 12.5 Gram(s) IV Push once  dextrose 50% Injectable 25 Gram(s) IV Push once  dextrose 50% Injectable 25 Gram(s) IV Push once  epoetin flower Injectable 41116 Unit(s) SubCutaneous <User Schedule>  ferrous    sulfate 325 milliGRAM(s) Oral daily  furosemide    Tablet 40 milliGRAM(s) Oral two times a day  heparin  Injectable 5000 Unit(s) SubCutaneous every 8 hours  influenza   Vaccine 0.5 milliLiter(s) IntraMuscular once  insulin lispro (HumaLOG) corrective regimen sliding scale   SubCutaneous Before meals and at bedtime  metoprolol     tartrate 25 milliGRAM(s) Oral every 8 hours  pantoprazole  Injectable 40 milliGRAM(s) IV Push daily    MEDICATIONS  (PRN):  dextrose Gel 1 Dose(s) Oral once PRN Blood Glucose LESS THAN 70 milliGRAM(s)/deciliter  glucagon  Injectable 1 milliGRAM(s) IntraMuscular once PRN Glucose LESS THAN 70 milligrams/deciliter         Vitals log        ICU Vital Signs Last 24 Hrs  T(C): 36.5 (07 Dec 2017 12:26), Max: 37.1 (06 Dec 2017 20:11)  T(F): 97.7 (07 Dec 2017 12:26), Max: 98.7 (06 Dec 2017 20:11)  HR: 63 (07 Dec 2017 12:00) (57 - 74)  BP: 161/68 (07 Dec 2017 12:00) (129/109 - 178/64)  BP(mean): 96 (07 Dec 2017 12:00) (88 - 116)  ABP: --  ABP(mean): --  RR: 14 (07 Dec 2017 12:00) (12 - 18)  SpO2: 100% (07 Dec 2017 12:00) (89% - 100%)           Input and Output:  I&O's Detail    06 Dec 2017 07:01  -  07 Dec 2017 07:00  --------------------------------------------------------  IN:    Oral Fluid: 620 mL  Total IN: 620 mL    OUT:    Voided: 910 mL  Total OUT: 910 mL    Total NET: -290 mL      07 Dec 2017 07:01  -  07 Dec 2017 14:42  --------------------------------------------------------  IN:  Total IN: 0 mL    OUT:    Voided: 550 mL  Total OUT: 550 mL    Total NET: -550 mL          Lab Data                        8.1    4.0   )-----------( 171      ( 07 Dec 2017 06:51 )             25.1     12-07    145  |  107  |  53<H>  ----------------------------<  145<H>  4.4   |  25  |  4.90<H>    Ca    8.8      07 Dec 2017 06:51    TPro  6.1  /  Alb  3.1<L>  /  TBili  x   /  DBili  x   /  AST  x   /  ALT  x   /  AlkPhos  x   12-05            Review of Systems	      Objective     Physical Examination    head at  heart s1s2  lungs dec BS  abd - soft      Pertinent Lab findings & Imaging      Lizette:  NO   Adequate UO     I&O's Detail    06 Dec 2017 07:01  -  07 Dec 2017 07:00  --------------------------------------------------------  IN:    Oral Fluid: 620 mL  Total IN: 620 mL    OUT:    Voided: 910 mL  Total OUT: 910 mL    Total NET: -290 mL      07 Dec 2017 07:01  -  07 Dec 2017 14:42  --------------------------------------------------------  IN:  Total IN: 0 mL    OUT:    Voided: 550 mL  Total OUT: 550 mL    Total NET: -550 mL               Discussed with:     Cultures:	        Radiology

## 2017-12-07 NOTE — PROGRESS NOTE ADULT - PROBLEM SELECTOR PROBLEM 3
Chronic obstructive pulmonary disease, unspecified COPD type
Renal failure, unspecified chronicity
Chronic obstructive pulmonary disease, unspecified COPD type
COPD (chronic obstructive pulmonary disease)
Chronic obstructive pulmonary disease with acute exacerbation
Chronic obstructive pulmonary disease, unspecified COPD type
ETOH Abuse
Renal failure, unspecified chronicity
COPD (chronic obstructive pulmonary disease)
Chronic obstructive pulmonary disease with acute exacerbation
Chronic obstructive pulmonary disease with acute exacerbation
Chronic obstructive pulmonary disease, unspecified COPD type
Renal failure, unspecified chronicity
Type 2 diabetes mellitus with diabetic chronic kidney disease, unspecified CKD stage, unspecified long term insulin use status
Type 2 diabetes mellitus with diabetic chronic kidney disease, unspecified CKD stage, unspecified long term insulin use status
Cardiopulmonary arrest with successful resuscitation
Type 2 diabetes mellitus with diabetic chronic kidney disease, unspecified CKD stage, unspecified long term insulin use status

## 2017-12-07 NOTE — PROGRESS NOTE ADULT - SUBJECTIVE AND OBJECTIVE BOX
REASON FOR VISIT: S/p cardiac arrest    HPI: 62 year old man with a history of chronic illness, severe PVD s/p left BKA, anemia, peripheral neuropathy, uncontrolled DM, etOH abuse transferred from his long term care facility for the evaluation of hypoxia; now s/p asystolic arrest in ED due to hypoxic respiratory failure; also with renal failure.    12/1/17:  Sedated on vent; more responsive per RN.  12/2/17: extubated and breathing better but still SOB and sating in high 80's to low 90's.  12/3/17: resting and sleeping comfortably, Sat in 92% range.    12/5/17  patient is feeling better , denies any sob , patient blood pressure is better ,     12/06/17 - No chest pain, dizziness. Breathing comfortably with O2 mask in oplace.   12/7/17    Patient is feeling better , comfortable ,denies any chest pain . patient is anemic ,     PAST MEDICAL & SURGICAL HISTORY:  Peripheral Arterial Crgoywr50/06/17 -   Controlled Type 2 Diabetes with Leg or Foot Ulcer  ETOH Abuse  Amputation, Below Knee, Unilateral, Traumatic      Allergies    No Known Allergies      MEDICATIONS  (STANDING):  ALBUTerol/ipratropium for Nebulization 3 milliLiter(s) Nebulizer every 6 hours  amLODIPine   Tablet 5 milliGRAM(s) Oral daily  buDESOnide 160 MICROgram(s)/formoterol 4.5 MICROgram(s) Inhaler 2 Puff(s) Inhalation two times a day  dextrose 5%. 1000 milliLiter(s) (50 mL/Hr) IV Continuous <Continuous>  dextrose 50% Injectable 12.5 Gram(s) IV Push once  dextrose 50% Injectable 25 Gram(s) IV Push once  dextrose 50% Injectable 25 Gram(s) IV Push once  epoetin flower Injectable 60630 Unit(s) SubCutaneous <User Schedule>  ferrous    sulfate 325 milliGRAM(s) Oral daily  furosemide    Tablet 40 milliGRAM(s) Oral two times a day  heparin  Injectable 5000 Unit(s) SubCutaneous every 8 hours  influenza   Vaccine 0.5 milliLiter(s) IntraMuscular once  insulin lispro (HumaLOG) corrective regimen sliding scale   SubCutaneous Before meals and at bedtime  metoprolol     tartrate 25 milliGRAM(s) Oral every 8 hours  pantoprazole  Injectable 40 milliGRAM(s) IV Push daily    MEDICATIONS  (PRN):  dextrose Gel 1 Dose(s) Oral once PRN Blood Glucose LESS THAN 70 milliGRAM(s)/deciliter  glucagon  Injectable 1 milliGRAM(s) IntraMuscular once PRN Glucose LESS THAN 70 milligrams/deciliter    Vital Signs Last 24 Hrs  T(C): 36.7 (07 Dec 2017 04:04), Max: 37.1 (06 Dec 2017 20:11)  T(F): 98.1 (07 Dec 2017 04:04), Max: 98.7 (06 Dec 2017 20:11)  HR: 65 (07 Dec 2017 08:00) (57 - 74)  BP: 129/109 (07 Dec 2017 08:00) (129/109 - 178/64)  BP(mean): 116 (07 Dec 2017 08:00) (89 - 116)  RR: 13 (07 Dec 2017 08:00) (13 - 19)  SpO2: 94% (07 Dec 2017 08:00) (89% - 98%)    I&O's Summary    06 Dec 2017 07:01  -  07 Dec 2017 07:00  --------------------------------------------------------  IN: 620 mL / OUT: 910 mL / NET: -290 mL          PHYSICAL EXAM:    Constitutional: NAD, awake and alert, well-developed  HEENT: NC; no oral cyanosis.   Respiratory: Breath sounds are decreased at the bases. Rare rhonchi  Cardiovascular: S1 and S2, regular rhythm, + systolic murmur base.   Gastrointestinal: Bowel Sounds present, soft, nontender.   Extremities: No peripheral edema. No clubbing or cyanosis. + BKA left.  Neurological: Awake and alert.   Musculoskeletal: no calf tenderness ( right).   Skin: No rashes.      LABS: All Labs Reviewed:                                    8.1    4.0   )-----------( 171      ( 07 Dec 2017 06:51 )             25.1     12-07    145  |  107  |  53<H>  ----------------------------<  145<H>  4.4   |  25  |  4.90<H>    Ca    8.8      07 Dec 2017 06:51    TPro  6.1  /  Alb  3.1<L>  /  TBili  x   /  DBili  x   /  AST  x   /  ALT  x   /  AlkPhos  x   12-05        LIVER FUNCTIONS - ( 05 Dec 2017 20:47 )  Alb: 3.1 g/dL / Pro: 6.1 g/dL / ALK PHOS: x     / ALT: x     / AST: x     / GGT: x                   RADIOLOGY/EKG:    EXAM:  CHEST PORTABLE ROUTINE                                  PROCEDURE DATE:  12/05/2017          INTERPRETATION:  Clinical information: Pneumonia    Portable exam, 5:37 AM.    Comparison study dated 12/4/2017.    Cardiac monitor leads overlie the chest.  Worsening appearance of the chest since the prior exam. Bibasilar   airspace disease, right greater than left. The right hemidiaphragm is   obscured on the current study. Left hemidiaphragm is also obscured not   significantly changed since prior exam.    Heart size appears enlarged, magnified secondary to portable technique.   Aortic knob contains calcifications. No acute osseous abnormalities.    IMPRESSION: See above report.      SARAN BREWSTER M.D.,ATTENDING RADIOLOGIST  This document has been electronically signed. Dec  5 2017  8:43AM        CONCLUSIONS:  1. Normal left ventricular size and function. LVEF estimated at 65-70%.  2. Normal right ventricular size and function.  3. Minimal prolapse of the anterior mitral leaflet. Mild mitral stenosis.    Minimal mitral regurgitation.  4. Mild aortic stenosis.  5. Mild tricuspid regurgitation.         HELGA GEORGE M.D., ATTENDING CARDIOLOGIST  This document has been electronically signed. Nov 30 2017 10:40AM    rhythm - sinus rhythm

## 2017-12-07 NOTE — PROGRESS NOTE ADULT - PROBLEM SELECTOR PROBLEM 2
Aortic stenosis, mild
Aspiration pneumonia due to gastric secretions, unspecified laterality, unspecified part of lung
CKD (chronic kidney disease) stage 5, GFR less than 15 ml/min
Aortic stenosis, mild
Aspiration pneumonia due to gastric secretions, unspecified laterality, unspecified part of lung
EDWIN (acute kidney injury)
EDWIN (acute kidney injury)
ETOH Abuse
Aortic stenosis, mild
Aspiration pneumonia due to gastric secretions, unspecified laterality, unspecified part of lung
Cardiopulmonary arrest with successful resuscitation
R/O Aspiration into airway, initial encounter
R/O Aspiration into airway, initial encounter
Cardiopulmonary arrest with successful resuscitation
Chronic obstructive pulmonary disease with acute exacerbation
Cardiopulmonary arrest with successful resuscitation

## 2017-12-07 NOTE — PROGRESS NOTE ADULT - PROBLEM SELECTOR PLAN 3
C/w sliding scale.  Monitor FSGlucose closely.
Nebulizers/Bronchodilators  Antibiotics  Oxygen supplementation
No hydronephrosis; acute on chronic renal failure per nephrology. Dialysis being contemplated. patient still hypernatremic , recommend to increase free water intake , monitor sodium level
NEBS  chest pt  NIPPV this am for resp distress
COPD  NEBS  smoking cess ed  increase activity  keep sat > 88 pct
Nebulizers/Bronchodilators  Antibiotics  Oxygen supplementation
Nebulizers/Bronchodilators  Antibiotics  Oxygen supplementation
No hydronephrosis; acute on chronic renal failure per nephrology.
No hydronephrosis; acute on chronic renal failure per nephrology. Dialysis being contemplated.
No hydronephrosis; acute on chronic renal failure per nephrology. Dialysis being contemplated.
bronchodilators  oral and skin care  vent support  weaning as tolerated  keep sat > 88 pct  chest pt  suction PRN
continue steroids   Nebulizers/Bronchodilators  Antibiotics  Oxygen supplementation.
counseling  monitor clinical status
C/w sliding scale.  Monitor FS Glucose closely.
Nebulizers/Bronchodilators  Antibiotics  Oxygen supplementation
Nebulizers/Bronchodilators  Antibiotics  Oxygen supplementation
No hydronephrosis; acute on chronic renal failure per nephrology. Dialysis being contemplated.
Steroids-solumedrol 125mg IV x1 then 40mg Q8 hours  Nebulizers/Bronchodilators  Antibiotics  Oxygen supplementation
c/w bronchodilator.  Pulmonary -/Ainsley - recommendations being followed.
c/w bronchodilator.  Pulmonary -/Ainsley.
continue steroids   Nebulizers/Bronchodilators  Antibiotics  Oxygen supplementation.
sliding scale.
secondary to acute respiratory failure due to pneumonia.  now stable . monitor clinically

## 2017-12-08 ENCOUNTER — TRANSCRIPTION ENCOUNTER (OUTPATIENT)
Age: 62
End: 2017-12-08

## 2017-12-08 VITALS — TEMPERATURE: 99 F

## 2017-12-08 LAB
ALBUMIN SERPL ELPH-MCNC: 2.6 G/DL — LOW (ref 3.3–5)
ALP SERPL-CCNC: 94 U/L — SIGNIFICANT CHANGE UP (ref 30–120)
ALT FLD-CCNC: 16 U/L DA — SIGNIFICANT CHANGE UP (ref 10–60)
ANION GAP SERPL CALC-SCNC: 13 MMOL/L — SIGNIFICANT CHANGE UP (ref 5–17)
AST SERPL-CCNC: 18 U/L — SIGNIFICANT CHANGE UP (ref 10–40)
BASOPHILS # BLD AUTO: 0.1 K/UL — SIGNIFICANT CHANGE UP (ref 0–0.2)
BASOPHILS NFR BLD AUTO: 1.2 % — SIGNIFICANT CHANGE UP (ref 0–2)
BILIRUB SERPL-MCNC: 0.4 MG/DL — SIGNIFICANT CHANGE UP (ref 0.2–1.2)
BUN SERPL-MCNC: 49 MG/DL — HIGH (ref 7–23)
CALCIUM SERPL-MCNC: 8.6 MG/DL — SIGNIFICANT CHANGE UP (ref 8.4–10.5)
CHLORIDE SERPL-SCNC: 107 MMOL/L — SIGNIFICANT CHANGE UP (ref 96–108)
CO2 SERPL-SCNC: 24 MMOL/L — SIGNIFICANT CHANGE UP (ref 22–31)
CREAT SERPL-MCNC: 5.09 MG/DL — HIGH (ref 0.5–1.3)
EOSINOPHIL # BLD AUTO: 0.4 K/UL — SIGNIFICANT CHANGE UP (ref 0–0.5)
EOSINOPHIL NFR BLD AUTO: 9 % — HIGH (ref 0–6)
GLUCOSE BLDC GLUCOMTR-MCNC: 141 MG/DL — HIGH (ref 70–99)
GLUCOSE BLDC GLUCOMTR-MCNC: 141 MG/DL — HIGH (ref 70–99)
GLUCOSE BLDC GLUCOMTR-MCNC: 200 MG/DL — HIGH (ref 70–99)
GLUCOSE SERPL-MCNC: 140 MG/DL — HIGH (ref 70–99)
HCT VFR BLD CALC: 24.9 % — LOW (ref 39–50)
HGB BLD-MCNC: 8.4 G/DL — LOW (ref 13–17)
LYMPHOCYTES # BLD AUTO: 0.6 K/UL — LOW (ref 1–3.3)
LYMPHOCYTES # BLD AUTO: 12.7 % — LOW (ref 13–44)
MCHC RBC-ENTMCNC: 31.1 PG — SIGNIFICANT CHANGE UP (ref 27–34)
MCHC RBC-ENTMCNC: 33.8 GM/DL — SIGNIFICANT CHANGE UP (ref 32–36)
MCV RBC AUTO: 92 FL — SIGNIFICANT CHANGE UP (ref 80–100)
MONOCYTES # BLD AUTO: 0.3 K/UL — SIGNIFICANT CHANGE UP (ref 0–0.9)
MONOCYTES NFR BLD AUTO: 7.4 % — SIGNIFICANT CHANGE UP (ref 2–14)
NEUTROPHILS # BLD AUTO: 3 K/UL — SIGNIFICANT CHANGE UP (ref 1.8–7.4)
NEUTROPHILS NFR BLD AUTO: 69.6 % — SIGNIFICANT CHANGE UP (ref 43–77)
NIGHT BLUE STAIN TISS: SIGNIFICANT CHANGE UP
NT-PROBNP SERPL-SCNC: HIGH PG/ML (ref 0–125)
PLATELET # BLD AUTO: 182 K/UL — SIGNIFICANT CHANGE UP (ref 150–400)
POTASSIUM SERPL-MCNC: 4.1 MMOL/L — SIGNIFICANT CHANGE UP (ref 3.5–5.3)
POTASSIUM SERPL-SCNC: 4.1 MMOL/L — SIGNIFICANT CHANGE UP (ref 3.5–5.3)
PROT SERPL-MCNC: 5.8 G/DL — LOW (ref 6–8.3)
RBC # BLD: 2.7 M/UL — LOW (ref 4.2–5.8)
RBC # FLD: 14.2 % — SIGNIFICANT CHANGE UP (ref 10.3–14.5)
SODIUM SERPL-SCNC: 144 MMOL/L — SIGNIFICANT CHANGE UP (ref 135–145)
SPECIMEN SOURCE: SIGNIFICANT CHANGE UP
WBC # BLD: 4.3 K/UL — SIGNIFICANT CHANGE UP (ref 3.8–10.5)
WBC # FLD AUTO: 4.3 K/UL — SIGNIFICANT CHANGE UP (ref 3.8–10.5)

## 2017-12-08 PROCEDURE — 76604 US EXAM CHEST: CPT

## 2017-12-08 PROCEDURE — 80048 BASIC METABOLIC PNL TOTAL CA: CPT

## 2017-12-08 PROCEDURE — 83615 LACTATE (LD) (LDH) ENZYME: CPT

## 2017-12-08 PROCEDURE — 85730 THROMBOPLASTIN TIME PARTIAL: CPT

## 2017-12-08 PROCEDURE — 81001 URINALYSIS AUTO W/SCOPE: CPT

## 2017-12-08 PROCEDURE — 85045 AUTOMATED RETICULOCYTE COUNT: CPT

## 2017-12-08 PROCEDURE — 87075 CULTR BACTERIA EXCEPT BLOOD: CPT

## 2017-12-08 PROCEDURE — 84484 ASSAY OF TROPONIN QUANT: CPT

## 2017-12-08 PROCEDURE — 36600 WITHDRAWAL OF ARTERIAL BLOOD: CPT

## 2017-12-08 PROCEDURE — 84155 ASSAY OF PROTEIN SERUM: CPT

## 2017-12-08 PROCEDURE — 83036 HEMOGLOBIN GLYCOSYLATED A1C: CPT

## 2017-12-08 PROCEDURE — 82746 ASSAY OF FOLIC ACID SERUM: CPT

## 2017-12-08 PROCEDURE — 88305 TISSUE EXAM BY PATHOLOGIST: CPT

## 2017-12-08 PROCEDURE — 76775 US EXAM ABDO BACK WALL LIM: CPT

## 2017-12-08 PROCEDURE — 97116 GAIT TRAINING THERAPY: CPT

## 2017-12-08 PROCEDURE — 84100 ASSAY OF PHOSPHORUS: CPT

## 2017-12-08 PROCEDURE — 87206 SMEAR FLUORESCENT/ACID STAI: CPT

## 2017-12-08 PROCEDURE — 32555 ASPIRATE PLEURA W/ IMAGING: CPT

## 2017-12-08 PROCEDURE — 93306 TTE W/DOPPLER COMPLETE: CPT

## 2017-12-08 PROCEDURE — 94640 AIRWAY INHALATION TREATMENT: CPT

## 2017-12-08 PROCEDURE — 83880 ASSAY OF NATRIURETIC PEPTIDE: CPT

## 2017-12-08 PROCEDURE — 83735 ASSAY OF MAGNESIUM: CPT

## 2017-12-08 PROCEDURE — 97161 PT EVAL LOW COMPLEX 20 MIN: CPT

## 2017-12-08 PROCEDURE — 84145 PROCALCITONIN (PCT): CPT

## 2017-12-08 PROCEDURE — 87070 CULTURE OTHR SPECIMN AEROBIC: CPT

## 2017-12-08 PROCEDURE — 71010: CPT | Mod: 26

## 2017-12-08 PROCEDURE — 36415 COLL VENOUS BLD VENIPUNCTURE: CPT

## 2017-12-08 PROCEDURE — 80053 COMPREHEN METABOLIC PANEL: CPT

## 2017-12-08 PROCEDURE — 89051 BODY FLUID CELL COUNT: CPT

## 2017-12-08 PROCEDURE — 84157 ASSAY OF PROTEIN OTHER: CPT

## 2017-12-08 PROCEDURE — 84443 ASSAY THYROID STIM HORMONE: CPT

## 2017-12-08 PROCEDURE — 71045 X-RAY EXAM CHEST 1 VIEW: CPT

## 2017-12-08 PROCEDURE — 82962 GLUCOSE BLOOD TEST: CPT

## 2017-12-08 PROCEDURE — 71250 CT THORAX DX C-: CPT

## 2017-12-08 PROCEDURE — 82042 OTHER SOURCE ALBUMIN QUAN EA: CPT

## 2017-12-08 PROCEDURE — 83540 ASSAY OF IRON: CPT

## 2017-12-08 PROCEDURE — 87116 MYCOBACTERIA CULTURE: CPT

## 2017-12-08 PROCEDURE — 99232 SBSQ HOSP IP/OBS MODERATE 35: CPT

## 2017-12-08 PROCEDURE — 87040 BLOOD CULTURE FOR BACTERIA: CPT

## 2017-12-08 PROCEDURE — 94760 N-INVAS EAR/PLS OXIMETRY 1: CPT

## 2017-12-08 PROCEDURE — 85027 COMPLETE CBC AUTOMATED: CPT

## 2017-12-08 PROCEDURE — 87015 SPECIMEN INFECT AGNT CONCNTJ: CPT

## 2017-12-08 PROCEDURE — 83550 IRON BINDING TEST: CPT

## 2017-12-08 PROCEDURE — 94660 CPAP INITIATION&MGMT: CPT

## 2017-12-08 PROCEDURE — 80307 DRUG TEST PRSMV CHEM ANLYZR: CPT

## 2017-12-08 PROCEDURE — 31720 CLEARANCE OF AIRWAYS: CPT

## 2017-12-08 PROCEDURE — 94002 VENT MGMT INPAT INIT DAY: CPT

## 2017-12-08 PROCEDURE — 85610 PROTHROMBIN TIME: CPT

## 2017-12-08 PROCEDURE — 82945 GLUCOSE OTHER FLUID: CPT

## 2017-12-08 PROCEDURE — 84165 PROTEIN E-PHORESIS SERUM: CPT

## 2017-12-08 PROCEDURE — 87086 URINE CULTURE/COLONY COUNT: CPT

## 2017-12-08 PROCEDURE — 99239 HOSP IP/OBS DSCHRG MGMT >30: CPT

## 2017-12-08 PROCEDURE — 88108 CYTOPATH CONCENTRATE TECH: CPT

## 2017-12-08 PROCEDURE — 99291 CRITICAL CARE FIRST HOUR: CPT | Mod: 25

## 2017-12-08 PROCEDURE — 93005 ELECTROCARDIOGRAM TRACING: CPT

## 2017-12-08 PROCEDURE — 83605 ASSAY OF LACTIC ACID: CPT

## 2017-12-08 PROCEDURE — 82607 VITAMIN B-12: CPT

## 2017-12-08 PROCEDURE — 94664 DEMO&/EVAL PT USE INHALER: CPT

## 2017-12-08 PROCEDURE — 82803 BLOOD GASES ANY COMBINATION: CPT

## 2017-12-08 PROCEDURE — 70450 CT HEAD/BRAIN W/O DYE: CPT

## 2017-12-08 PROCEDURE — 97110 THERAPEUTIC EXERCISES: CPT

## 2017-12-08 PROCEDURE — 83986 ASSAY PH BODY FLUID NOS: CPT

## 2017-12-08 PROCEDURE — 87205 SMEAR GRAM STAIN: CPT

## 2017-12-08 PROCEDURE — 82553 CREATINE MB FRACTION: CPT

## 2017-12-08 PROCEDURE — 82728 ASSAY OF FERRITIN: CPT

## 2017-12-08 PROCEDURE — 94799 UNLISTED PULMONARY SVC/PX: CPT

## 2017-12-08 PROCEDURE — 82270 OCCULT BLOOD FECES: CPT

## 2017-12-08 RX ORDER — BUDESONIDE AND FORMOTEROL FUMARATE DIHYDRATE 160; 4.5 UG/1; UG/1
0 AEROSOL RESPIRATORY (INHALATION)
Qty: 0 | Refills: 0 | COMMUNITY
Start: 2017-12-08

## 2017-12-08 RX ORDER — METOPROLOL TARTRATE 50 MG
0 TABLET ORAL
Qty: 0 | Refills: 0 | COMMUNITY

## 2017-12-08 RX ORDER — METOPROLOL TARTRATE 50 MG
1 TABLET ORAL
Qty: 0 | Refills: 0 | COMMUNITY
Start: 2017-12-08

## 2017-12-08 RX ORDER — FUROSEMIDE 40 MG
1 TABLET ORAL
Qty: 0 | Refills: 0 | COMMUNITY
Start: 2017-12-08

## 2017-12-08 RX ORDER — SODIUM BICARBONATE 1 MEQ/ML
0 SYRINGE (ML) INTRAVENOUS
Qty: 0 | Refills: 0 | COMMUNITY

## 2017-12-08 RX ORDER — HYDRALAZINE HCL 50 MG
75 TABLET ORAL
Qty: 0 | Refills: 0 | COMMUNITY

## 2017-12-08 RX ORDER — HYDRALAZINE HCL 50 MG
1 TABLET ORAL
Qty: 0 | Refills: 0 | COMMUNITY
Start: 2017-12-08

## 2017-12-08 RX ORDER — PANTOPRAZOLE SODIUM 20 MG/1
1 TABLET, DELAYED RELEASE ORAL
Qty: 30 | Refills: 0 | OUTPATIENT
Start: 2017-12-08 | End: 2018-01-07

## 2017-12-08 RX ORDER — ERYTHROPOIETIN 10000 [IU]/ML
0 INJECTION, SOLUTION INTRAVENOUS; SUBCUTANEOUS
Qty: 0 | Refills: 0 | COMMUNITY

## 2017-12-08 RX ORDER — INSULIN LISPRO 100/ML
0 VIAL (ML) SUBCUTANEOUS
Qty: 0 | Refills: 0 | COMMUNITY

## 2017-12-08 RX ORDER — HYDRALAZINE HCL 50 MG
25 TABLET ORAL EVERY 8 HOURS
Qty: 0 | Refills: 0 | Status: DISCONTINUED | OUTPATIENT
Start: 2017-12-08 | End: 2017-12-08

## 2017-12-08 RX ORDER — IPRATROPIUM/ALBUTEROL SULFATE 18-103MCG
3 AEROSOL WITH ADAPTER (GRAM) INHALATION
Qty: 0 | Refills: 0 | COMMUNITY
Start: 2017-12-08

## 2017-12-08 RX ORDER — INSULIN LISPRO 100/ML
0 VIAL (ML) SUBCUTANEOUS
Qty: 0 | Refills: 0 | COMMUNITY
Start: 2017-12-08

## 2017-12-08 RX ORDER — LANOLIN/MINERAL OIL
0 LOTION (ML) TOPICAL
Qty: 0 | Refills: 0 | COMMUNITY

## 2017-12-08 RX ADMIN — Medication 40 MILLIGRAM(S): at 05:46

## 2017-12-08 RX ADMIN — Medication 1: at 12:36

## 2017-12-08 RX ADMIN — Medication 325 MILLIGRAM(S): at 12:18

## 2017-12-08 RX ADMIN — ERYTHROPOIETIN 10000 UNIT(S): 10000 INJECTION, SOLUTION INTRAVENOUS; SUBCUTANEOUS at 15:51

## 2017-12-08 RX ADMIN — Medication 3 MILLILITER(S): at 07:19

## 2017-12-08 RX ADMIN — Medication 25 MILLIGRAM(S): at 09:45

## 2017-12-08 RX ADMIN — AMLODIPINE BESYLATE 5 MILLIGRAM(S): 2.5 TABLET ORAL at 05:46

## 2017-12-08 RX ADMIN — BUDESONIDE AND FORMOTEROL FUMARATE DIHYDRATE 2 PUFF(S): 160; 4.5 AEROSOL RESPIRATORY (INHALATION) at 06:22

## 2017-12-08 RX ADMIN — HEPARIN SODIUM 5000 UNIT(S): 5000 INJECTION INTRAVENOUS; SUBCUTANEOUS at 13:16

## 2017-12-08 RX ADMIN — HEPARIN SODIUM 5000 UNIT(S): 5000 INJECTION INTRAVENOUS; SUBCUTANEOUS at 05:46

## 2017-12-08 RX ADMIN — Medication 25 MILLIGRAM(S): at 05:46

## 2017-12-08 RX ADMIN — Medication 25 MILLIGRAM(S): at 13:16

## 2017-12-08 RX ADMIN — Medication 3 MILLILITER(S): at 13:05

## 2017-12-08 RX ADMIN — PANTOPRAZOLE SODIUM 40 MILLIGRAM(S): 20 TABLET, DELAYED RELEASE ORAL at 12:18

## 2017-12-08 NOTE — DISCHARGE NOTE ADULT - MEDICATION SUMMARY - MEDICATIONS TO CHANGE
I will SWITCH the dose or number of times a day I take the medications listed below when I get home from the hospital:    hydrALAZINE  -- 75 milligram(s) by mouth every 12 hours

## 2017-12-08 NOTE — PROGRESS NOTE ADULT - PROBLEM SELECTOR PROBLEM 1
Acute respiratory failure with hypoxia
Cardiac arrest
Aspiration pneumonia due to gastric secretions, unspecified laterality, unspecified part of lung
Acute respiratory failure with hypoxia
Aspiration pneumonia due to gastric secretions, unspecified laterality, unspecified part of lung
CKD (chronic kidney disease) stage 5, GFR less than 15 ml/min
Cardiac arrest
R/O Aspiration into airway, initial encounter
Acute respiratory failure with hypoxia
Anemia
Anemia
Cardiac arrest
Acute respiratory failure with hypoxia

## 2017-12-08 NOTE — DISCHARGE NOTE ADULT - PATIENT PORTAL LINK FT
“You can access the FollowHealth Patient Portal, offered by [facility], by registering with the following website: [email or web address]”

## 2017-12-08 NOTE — DISCHARGE NOTE ADULT - MEDICATION SUMMARY - MEDICATIONS TO TAKE
I will START or STAY ON the medications listed below when I get home from the hospital:    insulin lispro 100 units/mL subcutaneous solution  --  subcutaneous 4 times a day (before meals and at bedtime); 1 Unit(s) if Glucose 151 - 200  2 Unit(s) if Glucose 201 - 250  3 Unit(s) if Glucose 251 - 300  4 Unit(s) if Glucose 301 - 350  5 Unit(s) if Glucose 351 - 400  6 Unit(s) if Glucose Greater Than 400  -- Indication: For diabetes    metoprolol tartrate 25 mg oral tablet  -- 1 tab(s) by mouth every 8 hours  -- Indication: For Congestive heart failure (CHF)    ipratropium-albuterol 0.5 mg-2.5 mg/3 mLinhalation solution  -- 3 milliliter(s) inhaled every 6 hours  -- Indication: For COPD (chronic obstructive pulmonary disease)    budesonide-formoterol 160 mcg-4.5 mcg/inh inhalation aerosol  --  inhaled   -- Indication: For COPD (chronic obstructive pulmonary disease)    Norvasc 5 mg oral tablet  -- 1 tab(s) by mouth once a day  -- Indication: For hypertension    furosemide 40 mg oral tablet  -- 1 tab(s) by mouth once a day  -- Indication: For Chf    Procrit 3000 units/mL injectable solution  -- injectable 3 times a week. dose per renal doctor.  -- Indication: For Anemia    ferrous sulfate 325 mg (65 mg elemental iron) oral delayed release tablet  -- 1 tab(s) by mouth once a day  -- Indication: For Anemia    Protonix 40 mg oral delayed release tablet  -- 1 tab(s) by mouth once a day   -- It is very important that you take or use this exactly as directed.  Do not skip doses or discontinue unless directed by your doctor.  Obtain medical advice before taking any non-prescription drugs as some may affect the action of this medication.  Swallow whole.  Do not crush.    -- Indication: For GERD    hydrALAZINE 25 mg oral tablet  -- 1 tab(s) by mouth every 8 hours  -- Indication: For hypertension

## 2017-12-08 NOTE — PROGRESS NOTE ADULT - ASSESSMENT
Seen for AMS/s/p cardiac arrest.   Awake, No focal neuro deficit.  No signs of Hypoxic/Ischemic brain injury clinically.  Underlying Poor cognition   CT Head - No acute pathology.  Supportive care.  Safety precautions.

## 2017-12-08 NOTE — PROGRESS NOTE ADULT - ASSESSMENT
·	EDWIN on CKD 4: ATN  ·	s/p cardiac arrest, on vent, ? Pneumonia  ·	Metaboic acidosis  ·	Hyperkalemia  ·	Diabetes  ·	Anemia    Stable creatinine. On PO lasix. Will continue once daily on discharge. Procrit rx.   Pulmonary and cardiology follow up. On zosyn. Will follow electrolytes and renal function trend.   D/c planning.

## 2017-12-08 NOTE — PROGRESS NOTE ADULT - SUBJECTIVE AND OBJECTIVE BOX
Patient is a 62y old  Male who presents with a chief complaint of c/o SOB, respiratory distress, s/p cardiopulmonary arrest (30 Nov 2017 10:32)       Pt is seen and examined, pt is awake and lying in bed, pt is on monitor bed and hr is at 59/min and bp is at 148/56 at this time.  pt seems comfortable and denies any complaints at this time    HPI:  61 y/o male with PMH of PVD s/p left BKA, ETOH, ? COPD, ? ckd was sent from [facility] for evaluation of low Pulse ox.  Pt had cardiopulmonary arrest in ED due to acute respiratory failure. .Pt is intubated, +coker cath. ? aspiration - empirically on antibiotics. elevated lactate4.8 on admission which is trending down.  High BNP. CXR -right sided. patchy opacification. ECHO- normal as per cardiology. unequal pupils- right- 4mmand left 2mm. CAT head- no acute event. elevated BNP. (30 Nov 2017 10:32)       MEDICATIONS  (STANDING):  ALBUTerol/ipratropium for Nebulization 3 milliLiter(s) Nebulizer every 6 hours  amLODIPine   Tablet 5 milliGRAM(s) Oral daily  buDESOnide 160 MICROgram(s)/formoterol 4.5 MICROgram(s) Inhaler 2 Puff(s) Inhalation two times a day  dextrose 5%. 1000 milliLiter(s) (50 mL/Hr) IV Continuous <Continuous>  dextrose 50% Injectable 12.5 Gram(s) IV Push once  dextrose 50% Injectable 25 Gram(s) IV Push once  dextrose 50% Injectable 25 Gram(s) IV Push once  epoetin flower Injectable 91336 Unit(s) SubCutaneous <User Schedule>  ferrous    sulfate 325 milliGRAM(s) Oral daily  furosemide    Tablet 40 milliGRAM(s) Oral two times a day  heparin  Injectable 5000 Unit(s) SubCutaneous every 8 hours  hydrALAZINE 25 milliGRAM(s) Oral every 8 hours  influenza   Vaccine 0.5 milliLiter(s) IntraMuscular once  insulin lispro (HumaLOG) corrective regimen sliding scale   SubCutaneous Before meals and at bedtime  metoprolol     tartrate 25 milliGRAM(s) Oral every 8 hours  pantoprazole  Injectable 40 milliGRAM(s) IV Push daily    MEDICATIONS  (PRN):  dextrose Gel 1 Dose(s) Oral once PRN Blood Glucose LESS THAN 70 milliGRAM(s)/deciliter  glucagon  Injectable 1 milliGRAM(s) IntraMuscular once PRN Glucose LESS THAN 70 milligrams/deciliter      Allergies    No Known Allergies    Intolerances        Vital Signs Last 24 Hrs  T(C): 37.4 (08 Dec 2017 08:42), Max: 37.4 (08 Dec 2017 08:42)  T(F): 99.3 (08 Dec 2017 08:42), Max: 99.3 (08 Dec 2017 08:42)  HR: 62 (08 Dec 2017 08:00) (56 - 70)  BP: 176/62 (08 Dec 2017 08:00) (132/48 - 176/62)  BP(mean): 94 (08 Dec 2017 08:00) (73 - 115)  RR: 11 (08 Dec 2017 08:00) (11 - 17)  SpO2: 100% (08 Dec 2017 08:00) (94% - 100%)    PHYSICAL EXAM  General: adult in NAD  HEENT: clear oropharynx, anicteric sclera, pink conjunctiva  Neck: supple  CV: normal S1/S2 with no murmur rubs or gallops  Lungs: positive air movement b/l ant lungs,clear to auscultation, no wheezes, no rales  Abdomen: soft non-tender non-distended, no hepatosplenomegaly  Ext: no clubbing cyanosis or edema  Skin: no rashes and no petechiae  Neuro: alert and oriented X 4, no focal deficits  LABS:                          8.4    4.3   )-----------( 182      ( 08 Dec 2017 06:46 )             24.9         Mean Cell Volume : 92.0 fl  Mean Cell Hemoglobin : 31.1 pg  Mean Cell Hemoglobin Concentration : 33.8 gm/dL  Auto Neutrophil # : 3.0 K/uL  Auto Lymphocyte # : 0.6 K/uL  Auto Monocyte # : 0.3 K/uL  Auto Eosinophil # : 0.4 K/uL  Auto Basophil # : 0.1 K/uL  Auto Neutrophil % : 69.6 %  Auto Lymphocyte % : 12.7 %  Auto Monocyte % : 07.4 %  Auto Eosinophil % : 9.0 %  Auto Basophil % : 1.2 %    Serial CBC's  12-08 @ 06:46  Hct-24.9 / Hgb-8.4 / Plat-182 / RBC-2.70 / WBC-4.3          Serial CBC's  12-07 @ 06:51  Hct-25.1 / Hgb-8.1 / Plat-171 / RBC-2.68 / WBC-4.0          Serial CBC's  12-06 @ 06:52  Hct-23.1 / Hgb-7.7 / Plat-154 / RBC-2.49 / WBC-4.4          Serial CBC's  12-05 @ 20:49  Hct--- / Hgb--- / Plat--- / RBC-2.78 / WBC---          Serial CBC's  12-05 @ 06:54  Hct-27.2 / Hgb-8.4 / Plat-172 / RBC-2.87 / WBC-5.5            12-08    144  |  107  |  49<H>  ----------------------------<  140<H>  4.1   |  24  |  5.09<H>    Ca    8.6      08 Dec 2017 06:46    TPro  5.8<L>  /  Alb  2.6<L>  /  TBili  0.4  /  DBili  x   /  AST  18  /  ALT  16  /  AlkPhos  94  12-08          Reticulocyte Percent: 1.8 % (12-05-17 @ 20:49)  Vitamin B12, Serum: 1711 pg/mL (12-05-17 @ 11:51)  Folate, Serum: 11.9 ng/mL (12-05-17 @ 11:51)  Ferritin, Serum: 173.0 ng/mL (12-04-17 @ 11:31)  Iron - Total Binding Capacity.: 177 ug/dL (12-04-17 @ 11:31)      Serum Protein Electrophoresis Interp: Normal Electrophoresis Pattern (12-05-17 @ 20:47)

## 2017-12-08 NOTE — PROGRESS NOTE ADULT - SUBJECTIVE AND OBJECTIVE BOX
Neurology Follow up note    JP SANDOVAL 62y Male    HPI:  63 y/o male with PMH of PVD s/p left BKA, ETOH, ? COPD, ? ckd was sent from [facility] for evaluation of low Pulse ox.  Pt had cardiopulmonary arrest in ED due to acute respiratory failure. .Pt is intubated, +coker cath. ? aspiration - empirically on antibiotics. elevated lactate4.8 on admission which is trending down.  High BNP. CXR -right sided. patchy opacification. ECHO- normal as per cardiology. unequal pupils- right- 4mmand left 2mm. CAT head- no acute event. elevated BNP. (30 Nov 2017 10:32)    Interval History -    Patient is seen, chart was reviewed and case was discussed with the treatment team.  Pt is not in any distress.     Vital Signs Last 24 Hrs  T(C): 37.4 (08 Dec 2017 08:42), Max: 37.4 (08 Dec 2017 08:42)  T(F): 99.3 (08 Dec 2017 08:42), Max: 99.3 (08 Dec 2017 08:42)  HR: 62 (08 Dec 2017 12:00) (56 - 70)  BP: 148/56 (08 Dec 2017 12:00) (132/48 - 176/62)  BP(mean): 86 (08 Dec 2017 10:00) (73 - 115)  RR: 18 (08 Dec 2017 12:00) (11 - 18)  SpO2: 98% (08 Dec 2017 12:00) (93% - 100%)    REVIEW OF SYSTEMS:    CONSTITUTIONAL: No fever  EYES: No eye pain,   ENMT:  Extubated  NECK: Extubated  RESPIRATORY: Extubated  CARDIOVASCULAR: No acute chest pain, palpitations,  or leg swelling  GASTROINTESTINAL: No abdominal pain. No nausea, vomiting, or hematemesis;  No melena or hematochezia.  GENITOURINARY: No  hematuria, or incontinence  MUSCULOSKELETAL: H/o Left BKA.  SKIN: No itching, rashes, or lesions   LYMPH NODES: No enlarged glands  NEUROLOGICAL: No headaches, memory loss,   PSYCHIATRIC: No depression, anxiety, mood swings, or difficulty sleeping  ENDOCRINE: No heat or cold intolerance;   HEME/LYMPH: No easy bruising, or bleeding gums  Allergy/Immunology. No medication allergy. No seasonal allergies.    GENERAL: NAD, well-groomed, well-developed  HEAD:  Atraumatic, Normocephalic  EYES: EOMI, PERRLA, conjunctiva and sclera clear  NECK: Supple, No JVD, thyroid non-palpable    On Neurological Examination:    Awake. Follows commands.   Speaks well.  Pupil 2 mm,  reacting to light - No pupillary asymmetry is seen.  No facial asymmetry.  Moves b/l UE equally.  Left BKA.  Moves B/L LE well.  Neck is supple.    MEDICATIONS    ALBUTerol/ipratropium for Nebulization 3 milliLiter(s) Nebulizer every 6 hours  amLODIPine   Tablet 5 milliGRAM(s) Oral daily  buDESOnide 160 MICROgram(s)/formoterol 4.5 MICROgram(s) Inhaler 2 Puff(s) Inhalation two times a day  dextrose 5%. 1000 milliLiter(s) IV Continuous <Continuous>  dextrose 50% Injectable 12.5 Gram(s) IV Push once  dextrose 50% Injectable 25 Gram(s) IV Push once  dextrose 50% Injectable 25 Gram(s) IV Push once  dextrose Gel 1 Dose(s) Oral once PRN  epoetin flower Injectable 87370 Unit(s) SubCutaneous <User Schedule>  ferrous    sulfate 325 milliGRAM(s) Oral daily  furosemide    Tablet 40 milliGRAM(s) Oral two times a day  glucagon  Injectable 1 milliGRAM(s) IntraMuscular once PRN  heparin  Injectable 5000 Unit(s) SubCutaneous every 8 hours  hydrALAZINE 25 milliGRAM(s) Oral every 8 hours  influenza   Vaccine 0.5 milliLiter(s) IntraMuscular once  insulin lispro (HumaLOG) corrective regimen sliding scale   SubCutaneous Before meals and at bedtime  metoprolol     tartrate 25 milliGRAM(s) Oral every 8 hours  pantoprazole  Injectable 40 milliGRAM(s) IV Push daily    Allergies    No Known Allergies    LABS:    CBC Full  -  ( 08 Dec 2017 06:46 )  WBC Count : 4.3 K/uL  Hemoglobin : 8.4 g/dL  Hematocrit : 24.9 %  Platelet Count - Automated : 182 K/uL  Mean Cell Volume : 92.0 fl  Mean Cell Hemoglobin : 31.1 pg  Mean Cell Hemoglobin Concentration : 33.8 gm/dL  Auto Neutrophil # : 3.0 K/uL  Auto Lymphocyte # : 0.6 K/uL  Auto Monocyte # : 0.3 K/uL  Auto Eosinophil # : 0.4 K/uL  Auto Basophil # : 0.1 K/uL  Auto Neutrophil % : 69.6 %  Auto Lymphocyte % : 12.7 %  Auto Monocyte % : 07.4 %  Auto Eosinophil % : 9.0 %  Auto Basophil % : 1.2 %      12-08    144  |  107  |  49<H>  ----------------------------<  140<H>  4.1   |  24  |  5.09<H>    Ca    8.6      08 Dec 2017 06:46    TPro  5.8<L>  /  Alb  2.6<L>  /  TBili  0.4  /  DBili  x   /  AST  18  /  ALT  16  /  AlkPhos  94  12-08    RADIOLOGY

## 2017-12-08 NOTE — PROGRESS NOTE ADULT - PROVIDER SPECIALTY LIST ADULT
Cardiology
Critical Care
Heme/Onc
Hospitalist
Infectious Disease
MICU
MICU
Nephrology
Neurology
Pulmonology
Nephrology
Nephrology
Hospitalist
Cardiology
Critical Care
MICU
Hospitalist
Pulmonology

## 2017-12-08 NOTE — PROGRESS NOTE ADULT - PROBLEM SELECTOR PLAN 1
likely multifactorial, most likely anemia secondary to renal , nl spep, other etiologies  like mds/other pathology for anemia cannot be excluded, pt sister/hcp declines invasive hem procedure/bm bx  h/h low but stable, hb 8.4 today  pt is on epogen 10,000 units s/c tiw--m/w/f  pt is on po fe qd  monitor h/h--transfuse prbc if hb is under 7 or symptomatic anemia  gi/dvtp---heparin s/c  d/w pt at bedside at length.

## 2017-12-08 NOTE — DISCHARGE NOTE ADULT - HOSPITAL COURSE
63 y/o male with PMH of PVD s/p left BKA, ETOH, ? COPD, ? ckd was sent from [facility] for evaluation of low Pulse ox.  Pt had cardiopulmonary arrest in ED due to acute respiratory failure. .Pt is intubated, +coker cath. ? aspiration - empirically on antibiotics. elevated lactate4.8 on admission which is trending down.  High BNP. CXR -right sided. patchy opacification. ECHO- normal as per cardiology. unequal pupils- right- 4mmand left 2mm. CAT head- no acute event. elevated BNP.  Clinically improved - extubated.  Finished abx course.  Remained hypoxic.  CXR/CT showed bilateral effusions/atelectasis.  Tapped by IR 12/7 - 1000+cc serous fluid from right lung and 800+cc from right lung removed.  CXR subsequently showed improved aeration.  Decreased oxygen requirement via nasal cannula.  Cleared by consultants for rehab discharge.

## 2017-12-08 NOTE — PROGRESS NOTE ADULT - PROBLEM SELECTOR PLAN 1
diuresis for CHF  I and O  BP control  monitor renal indices and lytes, supplement lytes as needed  HOB elevation  skin care  oral care  HOB elevation  PT as tolerated  keep sat > 88 pct  chest pt  will follow

## 2017-12-08 NOTE — PROGRESS NOTE ADULT - ASSESSMENT
contact #  sister----divinejorjeanel armstrong  phone # [phone]--was d/w sister on 12/5  pt is 62/m , a resident at nursing home past 10 years, with hx of etoh abuse, with ch anemia, renal insufficiency and is followed by nephrology, definitive plan for renal insufficiency is as per nephrology. Pt was brought in to hospital with resp insufficiency, went into cardiopul arrest, was revived , intubated and extubated, Hematology was consulted on 12/5/17 for anemia.  History per sister ---pt at nh for past 10 yrs, hx etoh abuse  this admission, s/p cardiac arrest--intubation--extubation  sister/hcp refuses invasive hem procedure  pt is on epogen 10 k s/c tiw---m/w/f  pt is on fe qd  monitor h/h--transfuse prbc if symptomatic anemia or if hb is under 7

## 2017-12-08 NOTE — PROGRESS NOTE ADULT - SUBJECTIVE AND OBJECTIVE BOX
REASON FOR VISIT: S/p cardiac arrest    HPI: 62 year old man with a history of chronic illness, severe PVD s/p left BKA, anemia, peripheral neuropathy, uncontrolled DM, etOH abuse transferred from his long term care facility for the evaluation of hypoxia; now s/p asystolic arrest in ED due to hypoxic respiratory failure; also with renal failure.    12/8/17:  Comfortable; no new complaints; denies: SOB, orthopnea, angina.	    MEDICATIONS  (STANDING):  ALBUTerol/ipratropium for Nebulization 3 milliLiter(s) Nebulizer every 6 hours  amLODIPine   Tablet 5 milliGRAM(s) Oral daily  buDESOnide 160 MICROgram(s)/formoterol 4.5 MICROgram(s) Inhaler 2 Puff(s) Inhalation two times a day  dextrose 5%. 1000 milliLiter(s) (50 mL/Hr) IV Continuous <Continuous>  epoetin flower Injectable 25076 Unit(s) SubCutaneous <User Schedule>  ferrous    sulfate 325 milliGRAM(s) Oral daily  furosemide    Tablet 40 milliGRAM(s) Oral two times a day  heparin  Injectable 5000 Unit(s) SubCutaneous every 8 hours  influenza   Vaccine 0.5 milliLiter(s) IntraMuscular once  insulin lispro (HumaLOG) corrective regimen sliding scale   SubCutaneous Before meals and at bedtime  metoprolol     tartrate 25 milliGRAM(s) Oral every 8 hours  pantoprazole  Injectable 40 milliGRAM(s) IV Push daily    MEDICATIONS  (PRN):  dextrose Gel 1 Dose(s) Oral once PRN Blood Glucose LESS THAN 70 milliGRAM(s)/deciliter  glucagon  Injectable 1 milliGRAM(s) IntraMuscular once PRN Glucose LESS THAN 70 milligrams/deciliter    PHYSICAL EXAM:  Constitutional: Chronically-ill appearing but in no distress  Respiratory: Breath sounds are decreased at the bases. No crackles.  Cardiovascular: S1 and S2, regular rhythm, + systolic murmur   Gastrointestinal: Bowel Sounds present, soft, nontender.   Extremities: No peripheral edema. No clubbing or cyanosis. + BKA left.  Neurological: Awake and alert.   Skin: No rashes.    LABS:                     8.4    4.3   )-----------( 182      ( 08 Dec 2017 06:46 )             24.9     144  |  107  |  49<H>  ----------------------------<  140<H>  4.1   |  24  |  5.09<H>    Echocardiogram:  1. Normal left ventricular size and function. LVEF estimated at 65-70%.  2. Normal right ventricular size and function.  3. Minimal prolapse of the anterior mitral leaflet. Mild mitral stenosis. Minimal mitral regurgitation.  4. Mild aortic stenosis.  5. Mild tricuspid regurgitation.     Tele: Sinus rhythm

## 2017-12-08 NOTE — PROGRESS NOTE ADULT - SUBJECTIVE AND OBJECTIVE BOX
Patient is a 62y old  Male who presents with a chief complaint of c/o SOB, respiratory distress, s/p cardiopulmonary arrest (30 Nov 2017 10:32)      Patient seen in follow up for EDWIN, CKD 4.  s/p Thoracentesis  No new complaints.     PAST MEDICAL HISTORY:  Peripheral Arterial Disease  Controlled Type 2 Diabetes with Leg or Foot Ulcer  ETOH Abuse    MEDICATIONS  (STANDING):  ALBUTerol/ipratropium for Nebulization 3 milliLiter(s) Nebulizer every 6 hours  amLODIPine   Tablet 5 milliGRAM(s) Oral daily  buDESOnide 160 MICROgram(s)/formoterol 4.5 MICROgram(s) Inhaler 2 Puff(s) Inhalation two times a day  dextrose 5%. 1000 milliLiter(s) (50 mL/Hr) IV Continuous <Continuous>  dextrose 50% Injectable 12.5 Gram(s) IV Push once  dextrose 50% Injectable 25 Gram(s) IV Push once  dextrose 50% Injectable 25 Gram(s) IV Push once  epoetin flower Injectable 10899 Unit(s) SubCutaneous <User Schedule>  ferrous    sulfate 325 milliGRAM(s) Oral daily  furosemide    Tablet 40 milliGRAM(s) Oral two times a day  heparin  Injectable 5000 Unit(s) SubCutaneous every 8 hours  hydrALAZINE 25 milliGRAM(s) Oral every 8 hours  influenza   Vaccine 0.5 milliLiter(s) IntraMuscular once  insulin lispro (HumaLOG) corrective regimen sliding scale   SubCutaneous Before meals and at bedtime  metoprolol     tartrate 25 milliGRAM(s) Oral every 8 hours  pantoprazole  Injectable 40 milliGRAM(s) IV Push daily    MEDICATIONS  (PRN):  dextrose Gel 1 Dose(s) Oral once PRN Blood Glucose LESS THAN 70 milliGRAM(s)/deciliter  glucagon  Injectable 1 milliGRAM(s) IntraMuscular once PRN Glucose LESS THAN 70 milligrams/deciliter    T(C): 37.4 (12-08-17 @ 08:42), Max: 37.4 (12-08-17 @ 08:42)  HR: 62 (12-08-17 @ 12:00) (56 - 74)  BP: 148/56 (12-08-17 @ 12:00) (129/109 - 178/64)  RR: 18 (12-08-17 @ 12:00)  SpO2: 98% (12-08-17 @ 12:00)  Wt(kg): --  I&O's Detail    07 Dec 2017 07:01  -  08 Dec 2017 07:00  --------------------------------------------------------  IN:    Oral Fluid: 370 mL  Total IN: 370 mL    OUT:    Voided: 1000 mL  Total OUT: 1000 mL    Total NET: -630 mL      08 Dec 2017 07:01  -  08 Dec 2017 12:51  --------------------------------------------------------  IN:    Oral Fluid: 250 mL  Total IN: 250 mL    OUT:    Voided: 600 mL  Total OUT: 600 mL    Total NET: -350 mL            PHYSICAL EXAM:  General: On oxygen mask  Respiratory: b/l air entry  Cardiovascular: S1 S2  Gastrointestinal: soft  Extremities: left BKA.           LABORATORY:                        8.4    4.3   )-----------( 182      ( 08 Dec 2017 06:46 )             24.9     12-08    144  |  107  |  49<H>  ----------------------------<  140<H>  4.1   |  24  |  5.09<H>    Ca    8.6      08 Dec 2017 06:46    TPro  5.8<L>  /  Alb  2.6<L>  /  TBili  0.4  /  DBili  x   /  AST  18  /  ALT  16  /  AlkPhos  94  12-08    Sodium, Serum: 144 mmol/L (12-08 @ 06:46)  Sodium, Serum: 145 mmol/L (12-07 @ 06:51)    Potassium, Serum: 4.1 mmol/L (12-08 @ 06:46)  Potassium, Serum: 4.4 mmol/L (12-07 @ 06:51)    Hemoglobin: 8.4 g/dL (12-08 @ 06:46)  Hemoglobin: 8.1 g/dL (12-07 @ 06:51)  Hemoglobin: 7.7 g/dL (12-06 @ 06:52)    Creatinine, Serum 5.09 (12-08 @ 06:46)  Creatinine, Serum 4.90 (12-07 @ 06:51)  Creatinine, Serum 5.02 (12-06 @ 11:38)        LIVER FUNCTIONS - ( 08 Dec 2017 06:46 )  Alb: 2.6 g/dL / Pro: 5.8 g/dL / ALK PHOS: 94 U/L / ALT: 16 U/L DA / AST: 18 U/L / GGT: x

## 2017-12-08 NOTE — DISCHARGE NOTE ADULT - PLAN OF CARE
prevent recurrence f/up with pulmonary doctor at the rehab facility within 3-5 days of discharge f/up with kidney doctor at the rehab facility within 5-10 days of discharge EF 70%.  monitor intake and output on lasix.   daily weights. nebs prn SSI coverage procrit injections as per renal doctor

## 2017-12-08 NOTE — PROGRESS NOTE ADULT - SUBJECTIVE AND OBJECTIVE BOX
Date/Time Patient Seen:  		  Referring MD:   Data Reviewed	       Patient is a 62y old  Male who presents with a chief complaint of c/o SOB, respiratory distress, s/p cardiopulmonary arrest (30 Nov 2017 10:32)    in bed  seen and examined  vs and meds reviewed  on Diuresis    on room air      Subjective/HPI     PAST MEDICAL & SURGICAL HISTORY:  Peripheral Arterial Disease  Controlled Type 2 Diabetes with Leg or Foot Ulcer  ETOH Abuse  Amputation, Below Knee, Unilateral, Traumatic  Amputation, Below Elbow, Unilateral, Traumatic        Medication list         MEDICATIONS  (STANDING):  ALBUTerol/ipratropium for Nebulization 3 milliLiter(s) Nebulizer every 6 hours  amLODIPine   Tablet 5 milliGRAM(s) Oral daily  buDESOnide 160 MICROgram(s)/formoterol 4.5 MICROgram(s) Inhaler 2 Puff(s) Inhalation two times a day  dextrose 5%. 1000 milliLiter(s) (50 mL/Hr) IV Continuous <Continuous>  dextrose 50% Injectable 12.5 Gram(s) IV Push once  dextrose 50% Injectable 25 Gram(s) IV Push once  dextrose 50% Injectable 25 Gram(s) IV Push once  epoetin flower Injectable 41898 Unit(s) SubCutaneous <User Schedule>  ferrous    sulfate 325 milliGRAM(s) Oral daily  furosemide    Tablet 40 milliGRAM(s) Oral two times a day  heparin  Injectable 5000 Unit(s) SubCutaneous every 8 hours  influenza   Vaccine 0.5 milliLiter(s) IntraMuscular once  insulin lispro (HumaLOG) corrective regimen sliding scale   SubCutaneous Before meals and at bedtime  metoprolol     tartrate 25 milliGRAM(s) Oral every 8 hours  pantoprazole  Injectable 40 milliGRAM(s) IV Push daily    MEDICATIONS  (PRN):  dextrose Gel 1 Dose(s) Oral once PRN Blood Glucose LESS THAN 70 milliGRAM(s)/deciliter  glucagon  Injectable 1 milliGRAM(s) IntraMuscular once PRN Glucose LESS THAN 70 milligrams/deciliter         Vitals log        ICU Vital Signs Last 24 Hrs  T(C): 37 (08 Dec 2017 04:04), Max: 37 (07 Dec 2017 20:05)  T(F): 98.6 (08 Dec 2017 04:04), Max: 98.6 (07 Dec 2017 20:05)  HR: 56 (08 Dec 2017 07:20) (56 - 70)  BP: 167/64 (08 Dec 2017 06:00) (129/109 - 168/67)  BP(mean): 92 (08 Dec 2017 06:00) (73 - 116)  ABP: --  ABP(mean): --  RR: 11 (08 Dec 2017 06:00) (11 - 17)  SpO2: 100% (08 Dec 2017 07:20) (94% - 100%)           Input and Output:  I&O's Detail    07 Dec 2017 07:01  -  08 Dec 2017 07:00  --------------------------------------------------------  IN:    Oral Fluid: 370 mL  Total IN: 370 mL    OUT:    Voided: 1000 mL  Total OUT: 1000 mL    Total NET: -630 mL          Lab Data                        8.4    4.3   )-----------( 182      ( 08 Dec 2017 06:46 )             24.9     12-08    144  |  107  |  49<H>  ----------------------------<  140<H>  4.1   |  24  |  5.09<H>    Ca    8.6      08 Dec 2017 06:46    TPro  5.8<L>  /  Alb  2.6<L>  /  TBili  0.4  /  DBili  x   /  AST  18  /  ALT  16  /  AlkPhos  94  12-08            Review of Systems	      Objective     Physical Examination    head at  heart s1s2  lungs dec BS  abd soft      Pertinent Lab findings & Imaging      Lizette:  NO   Adequate UO     I&O's Detail    07 Dec 2017 07:01  -  08 Dec 2017 07:00  --------------------------------------------------------  IN:    Oral Fluid: 370 mL  Total IN: 370 mL    OUT:    Voided: 1000 mL  Total OUT: 1000 mL    Total NET: -630 mL               Discussed with:     Cultures:	        Radiology

## 2017-12-08 NOTE — DISCHARGE NOTE ADULT - CARE PROVIDER_API CALL
Tai Blas), Medicine  [address] [facility]  [address] [place] [address]  Phone: [phone]  Fax: [phone]    Rodney Stein), Cardiovascular Disease; Internal Medicine  [address] [place] [address]  Phone: [phone]  Fax: [phone]

## 2017-12-08 NOTE — DISCHARGE NOTE ADULT - CARE PLAN
Principal Discharge DX:	Acute respiratory failure with hypoxia  Goal:	prevent recurrence  Instructions for follow-up, activity and diet:	f/up with pulmonary doctor at the rehab facility within 3-5 days of discharge  Secondary Diagnosis:	Cardiopulmonary arrest with successful resuscitation  Goal:	prevent recurrence  Instructions for follow-up, activity and diet:	f/up with kidney doctor at the rehab facility within 5-10 days of discharge  Secondary Diagnosis:	CKD (chronic kidney disease) stage 5, GFR less than 15 ml/min  Instructions for follow-up, activity and diet:	f/up with kidney doctor at the rehab facility within 5-10 days of discharge  Secondary Diagnosis:	Congestive heart failure (CHF)  Instructions for follow-up, activity and diet:	EF 70%.  monitor intake and output on lasix.   daily weights.  Secondary Diagnosis:	COPD (chronic obstructive pulmonary disease)  Instructions for follow-up, activity and diet:	nebs prn  Secondary Diagnosis:	Type 2 diabetes mellitus with diabetic chronic kidney disease, unspecified CKD stage, unspecified long term insulin use status  Instructions for follow-up, activity and diet:	SSI coverage  Secondary Diagnosis:	Anemia due to chronic kidney disease  Instructions for follow-up, activity and diet:	procrit injections as per renal doctor

## 2017-12-12 LAB
CULTURE RESULTS: SIGNIFICANT CHANGE UP
SPECIMEN SOURCE: SIGNIFICANT CHANGE UP

## 2018-01-27 LAB
CULTURE RESULTS: SIGNIFICANT CHANGE UP
SPECIMEN SOURCE: SIGNIFICANT CHANGE UP

## 2018-06-18 ENCOUNTER — INPATIENT (INPATIENT)
Facility: HOSPITAL | Age: 63
LOS: 7 days | Discharge: NURSING, REHAB OR LONG-TERM CARE | DRG: 239 | End: 2018-06-26
Attending: INTERNAL MEDICINE | Admitting: INTERNAL MEDICINE
Payer: MEDICAID

## 2018-06-18 VITALS
OXYGEN SATURATION: 95 % | WEIGHT: 121.03 LBS | DIASTOLIC BLOOD PRESSURE: 87 MMHG | TEMPERATURE: 99 F | SYSTOLIC BLOOD PRESSURE: 142 MMHG | RESPIRATION RATE: 18 BRPM | HEART RATE: 104 BPM

## 2018-06-18 DIAGNOSIS — N18.6 END STAGE RENAL DISEASE: ICD-10-CM

## 2018-06-18 DIAGNOSIS — I99.8 OTHER DISORDER OF CIRCULATORY SYSTEM: ICD-10-CM

## 2018-06-18 DIAGNOSIS — I10 ESSENTIAL (PRIMARY) HYPERTENSION: ICD-10-CM

## 2018-06-18 DIAGNOSIS — Z29.9 ENCOUNTER FOR PROPHYLACTIC MEASURES, UNSPECIFIED: ICD-10-CM

## 2018-06-18 DIAGNOSIS — D64.9 ANEMIA, UNSPECIFIED: ICD-10-CM

## 2018-06-18 DIAGNOSIS — I73.9 PERIPHERAL VASCULAR DISEASE, UNSPECIFIED: ICD-10-CM

## 2018-06-18 DIAGNOSIS — I25.10 ATHEROSCLEROTIC HEART DISEASE OF NATIVE CORONARY ARTERY WITHOUT ANGINA PECTORIS: ICD-10-CM

## 2018-06-18 DIAGNOSIS — I50.9 HEART FAILURE, UNSPECIFIED: ICD-10-CM

## 2018-06-18 LAB
ALBUMIN SERPL ELPH-MCNC: 2.4 G/DL — LOW (ref 3.3–5)
ALP SERPL-CCNC: 263 U/L — HIGH (ref 40–120)
ALT FLD-CCNC: 24 U/L — SIGNIFICANT CHANGE UP (ref 12–78)
ANION GAP SERPL CALC-SCNC: 12 MMOL/L — SIGNIFICANT CHANGE UP (ref 5–17)
APTT BLD: 33.2 SEC — SIGNIFICANT CHANGE UP (ref 27.5–37.4)
AST SERPL-CCNC: 32 U/L — SIGNIFICANT CHANGE UP (ref 15–37)
BASOPHILS # BLD AUTO: 0.04 K/UL — SIGNIFICANT CHANGE UP (ref 0–0.2)
BASOPHILS NFR BLD AUTO: 0.3 % — SIGNIFICANT CHANGE UP (ref 0–2)
BILIRUB SERPL-MCNC: 0.4 MG/DL — SIGNIFICANT CHANGE UP (ref 0.2–1.2)
BLD GP AB SCN SERPL QL: SIGNIFICANT CHANGE UP
BUN SERPL-MCNC: 51 MG/DL — HIGH (ref 7–23)
CALCIUM SERPL-MCNC: 8.2 MG/DL — LOW (ref 8.5–10.1)
CHLORIDE SERPL-SCNC: 92 MMOL/L — LOW (ref 96–108)
CO2 SERPL-SCNC: 28 MMOL/L — SIGNIFICANT CHANGE UP (ref 22–31)
CREAT SERPL-MCNC: 4.5 MG/DL — HIGH (ref 0.5–1.3)
EOSINOPHIL # BLD AUTO: 0.02 K/UL — SIGNIFICANT CHANGE UP (ref 0–0.5)
EOSINOPHIL NFR BLD AUTO: 0.1 % — SIGNIFICANT CHANGE UP (ref 0–6)
GLUCOSE SERPL-MCNC: 426 MG/DL — HIGH (ref 70–99)
HCT VFR BLD CALC: 29.4 % — LOW (ref 39–50)
HGB BLD-MCNC: 9.3 G/DL — LOW (ref 13–17)
IMM GRANULOCYTES NFR BLD AUTO: 0.5 % — SIGNIFICANT CHANGE UP (ref 0–1.5)
INR BLD: 1.43 RATIO — HIGH (ref 0.88–1.16)
LACTATE SERPL-SCNC: 1.6 MMOL/L — SIGNIFICANT CHANGE UP (ref 0.7–2)
LYMPHOCYTES # BLD AUTO: 0.65 K/UL — LOW (ref 1–3.3)
LYMPHOCYTES # BLD AUTO: 4.1 % — LOW (ref 13–44)
MCHC RBC-ENTMCNC: 27.1 PG — SIGNIFICANT CHANGE UP (ref 27–34)
MCHC RBC-ENTMCNC: 31.6 GM/DL — LOW (ref 32–36)
MCV RBC AUTO: 85.7 FL — SIGNIFICANT CHANGE UP (ref 80–100)
MONOCYTES # BLD AUTO: 0.56 K/UL — SIGNIFICANT CHANGE UP (ref 0–0.9)
MONOCYTES NFR BLD AUTO: 3.5 % — SIGNIFICANT CHANGE UP (ref 2–14)
NEUTROPHILS # BLD AUTO: 14.48 K/UL — HIGH (ref 1.8–7.4)
NEUTROPHILS NFR BLD AUTO: 91.5 % — HIGH (ref 43–77)
NRBC # BLD: 0 /100 WBCS — SIGNIFICANT CHANGE UP (ref 0–0)
PLATELET # BLD AUTO: 329 K/UL — SIGNIFICANT CHANGE UP (ref 150–400)
POTASSIUM SERPL-MCNC: 3.6 MMOL/L — SIGNIFICANT CHANGE UP (ref 3.5–5.3)
POTASSIUM SERPL-SCNC: 3.6 MMOL/L — SIGNIFICANT CHANGE UP (ref 3.5–5.3)
PROT SERPL-MCNC: 7.2 G/DL — SIGNIFICANT CHANGE UP (ref 6–8.3)
PROTHROM AB SERPL-ACNC: 15.7 SEC — HIGH (ref 9.8–12.7)
RBC # BLD: 3.43 M/UL — LOW (ref 4.2–5.8)
RBC # FLD: 16.3 % — HIGH (ref 10.3–14.5)
SODIUM SERPL-SCNC: 132 MMOL/L — LOW (ref 135–145)
WBC # BLD: 15.83 K/UL — HIGH (ref 3.8–10.5)
WBC # FLD AUTO: 15.83 K/UL — HIGH (ref 3.8–10.5)

## 2018-06-18 PROCEDURE — 71045 X-RAY EXAM CHEST 1 VIEW: CPT | Mod: 26

## 2018-06-18 PROCEDURE — 73630 X-RAY EXAM OF FOOT: CPT | Mod: 26,RT

## 2018-06-18 PROCEDURE — 99285 EMERGENCY DEPT VISIT HI MDM: CPT

## 2018-06-18 PROCEDURE — 27590 AMPUTATE LEG AT THIGH: CPT | Mod: AS,RT

## 2018-06-18 RX ORDER — FERROUS SULFATE 325(65) MG
325 TABLET ORAL DAILY
Qty: 0 | Refills: 0 | Status: DISCONTINUED | OUTPATIENT
Start: 2018-06-18 | End: 2018-06-26

## 2018-06-18 RX ORDER — GLUCAGON INJECTION, SOLUTION 0.5 MG/.1ML
1 INJECTION, SOLUTION SUBCUTANEOUS ONCE
Qty: 0 | Refills: 0 | Status: DISCONTINUED | OUTPATIENT
Start: 2018-06-18 | End: 2018-06-26

## 2018-06-18 RX ORDER — HEPARIN SODIUM 5000 [USP'U]/ML
5000 INJECTION INTRAVENOUS; SUBCUTANEOUS EVERY 12 HOURS
Qty: 0 | Refills: 0 | Status: DISCONTINUED | OUTPATIENT
Start: 2018-06-18 | End: 2018-06-19

## 2018-06-18 RX ORDER — ACETAMINOPHEN 500 MG
650 TABLET ORAL EVERY 6 HOURS
Qty: 0 | Refills: 0 | Status: DISCONTINUED | OUTPATIENT
Start: 2018-06-18 | End: 2018-06-26

## 2018-06-18 RX ORDER — SODIUM CHLORIDE 9 MG/ML
1000 INJECTION, SOLUTION INTRAVENOUS
Qty: 0 | Refills: 0 | Status: DISCONTINUED | OUTPATIENT
Start: 2018-06-18 | End: 2018-06-18

## 2018-06-18 RX ORDER — DEXTROSE 50 % IN WATER 50 %
15 SYRINGE (ML) INTRAVENOUS ONCE
Qty: 0 | Refills: 0 | Status: DISCONTINUED | OUTPATIENT
Start: 2018-06-18 | End: 2018-06-26

## 2018-06-18 RX ORDER — METOPROLOL TARTRATE 50 MG
50 TABLET ORAL EVERY 6 HOURS
Qty: 0 | Refills: 0 | Status: DISCONTINUED | OUTPATIENT
Start: 2018-06-19 | End: 2018-06-19

## 2018-06-18 RX ORDER — ASPIRIN/CALCIUM CARB/MAGNESIUM 324 MG
81 TABLET ORAL DAILY
Qty: 0 | Refills: 0 | Status: DISCONTINUED | OUTPATIENT
Start: 2018-06-18 | End: 2018-06-19

## 2018-06-18 RX ORDER — SODIUM CHLORIDE 9 MG/ML
1000 INJECTION, SOLUTION INTRAVENOUS
Qty: 0 | Refills: 0 | Status: DISCONTINUED | OUTPATIENT
Start: 2018-06-18 | End: 2018-06-26

## 2018-06-18 RX ORDER — INSULIN LISPRO 100/ML
VIAL (ML) SUBCUTANEOUS
Qty: 0 | Refills: 0 | Status: DISCONTINUED | OUTPATIENT
Start: 2018-06-18 | End: 2018-06-24

## 2018-06-18 RX ORDER — AMLODIPINE BESYLATE 2.5 MG/1
5 TABLET ORAL DAILY
Qty: 0 | Refills: 0 | Status: DISCONTINUED | OUTPATIENT
Start: 2018-06-19 | End: 2018-06-20

## 2018-06-18 RX ORDER — FAMOTIDINE 10 MG/ML
20 INJECTION INTRAVENOUS DAILY
Qty: 0 | Refills: 0 | Status: DISCONTINUED | OUTPATIENT
Start: 2018-06-18 | End: 2018-06-26

## 2018-06-18 RX ORDER — HYDRALAZINE HCL 50 MG
10 TABLET ORAL
Qty: 0 | Refills: 0 | Status: DISCONTINUED | OUTPATIENT
Start: 2018-06-18 | End: 2018-06-26

## 2018-06-18 RX ORDER — DEXTROSE 50 % IN WATER 50 %
25 SYRINGE (ML) INTRAVENOUS ONCE
Qty: 0 | Refills: 0 | Status: DISCONTINUED | OUTPATIENT
Start: 2018-06-18 | End: 2018-06-26

## 2018-06-18 RX ORDER — LANOLIN ALCOHOL/MO/W.PET/CERES
3 CREAM (GRAM) TOPICAL AT BEDTIME
Qty: 0 | Refills: 0 | Status: DISCONTINUED | OUTPATIENT
Start: 2018-06-18 | End: 2018-06-26

## 2018-06-18 RX ORDER — VANCOMYCIN HCL 1 G
1000 VIAL (EA) INTRAVENOUS ONCE
Qty: 0 | Refills: 0 | Status: COMPLETED | OUTPATIENT
Start: 2018-06-18 | End: 2018-06-18

## 2018-06-18 RX ORDER — OXYCODONE AND ACETAMINOPHEN 5; 325 MG/1; MG/1
2 TABLET ORAL EVERY 4 HOURS
Qty: 0 | Refills: 0 | Status: DISCONTINUED | OUTPATIENT
Start: 2018-06-18 | End: 2018-06-22

## 2018-06-18 RX ORDER — DEXTROSE 50 % IN WATER 50 %
12.5 SYRINGE (ML) INTRAVENOUS ONCE
Qty: 0 | Refills: 0 | Status: DISCONTINUED | OUTPATIENT
Start: 2018-06-18 | End: 2018-06-26

## 2018-06-18 RX ORDER — METOPROLOL TARTRATE 50 MG
50 TABLET ORAL EVERY 8 HOURS
Qty: 0 | Refills: 0 | Status: DISCONTINUED | OUTPATIENT
Start: 2018-06-18 | End: 2018-06-18

## 2018-06-18 RX ORDER — MORPHINE SULFATE 50 MG/1
2 CAPSULE, EXTENDED RELEASE ORAL EVERY 4 HOURS
Qty: 0 | Refills: 0 | Status: DISCONTINUED | OUTPATIENT
Start: 2018-06-18 | End: 2018-06-21

## 2018-06-18 RX ADMIN — Medication 50 MILLIGRAM(S): at 23:37

## 2018-06-18 RX ADMIN — Medication 3 MILLIGRAM(S): at 23:37

## 2018-06-18 RX ADMIN — Medication 250 MILLIGRAM(S): at 17:34

## 2018-06-18 RX ADMIN — Medication 10 MILLIGRAM(S): at 18:56

## 2018-06-18 NOTE — H&P ADULT - ATTENDING COMMENTS
OOBTC/PT .Advance care planning was d/w the family.Pain is accessed and addresed.Pt was screened for signs of clinical depression .Appropriate referral made.Risk of falls accessed.Fall prevention d/w family .Pt is screened for tobacco and etoh,counseling was done for etoh and tobacco cessation.Use of narcotic pain meds was discussed.Pt is advised to use narcotic meds  wisely and to avoid overusing them.Plan of care d/w family and all questions answered to best of my knowledge

## 2018-06-18 NOTE — GOALS OF CARE CONVERSATION - PERSONAL ADVANCE DIRECTIVE - NS PRO AD PATIENT TYPE ON CHART
Do Not Resuscitate (DNR)/molst completed 6/18/18/Medical Orders for Life-Sustaining Treatment (MOLST)

## 2018-06-18 NOTE — ED PROVIDER NOTE - MUSCULOSKELETAL, MLM
Spine appears normal, range of motion is not limited, sp left bka, r foot red swollen ecchymotic and blistered tender to light touch toe #3 is black with black coloring on sides of touchign toes and foot

## 2018-06-18 NOTE — GOALS OF CARE CONVERSATION - PERSONAL ADVANCE DIRECTIVE - CONVERSATION DETAILS
spoke to pt sister, Concepcion, on phone, hcp, discussed copy of molst from EMR, original at [facility], molst reviewed: sister agrees to dnr dni, wants no invasive ventilation, wants antibiotics, IV flds. wants to discuss further LEA as circumstances arise. Dr Gomes spoke to Concepcion on phone, surgeon to contact Concepcion regarding procedure. order received.  contact # given.

## 2018-06-18 NOTE — ED PROVIDER NOTE - OBJECTIVE STATEMENT
pt sent in to "be admitted to dr graham" from [facility] hx of htn pvd dm ckd anemia resp failure cardiac arrest, tia cva on hd sent for a "blood clot in artery of r foot"  per note from snf: r foot pain with dicloloration of hallux 2/3/4 no trauma. decreased to no pulses of foot  arterial doppler: diffuse monophasic waveforms-can be associated with stenosis of the inflow at the level of iliacs  xray: no fx dated 6/17 pt sent in to "be admitted to dr graham" from [facility] hx of htn pvd dm ckd anemia resp failure cardiac arrest, tia cva on hd sent for a "blood clot in artery of r foot"  per note from snf: r foot pain with discoloration of hallux 2/3/4 no trauma. decreased to no pulses of foot  arterial doppler: diffuse monophasic waveforms-can be associated with stenosis of the inflow at the level of iliacs  xray: no fx dated 6/17  pt stated sx began 2 days ago or so

## 2018-06-18 NOTE — H&P ADULT - RS GEN PE MLT RESP DETAILS PC
breath sounds equal/good air movement/normal/diminished breath sounds, R/diminished breath sounds, L/airway patent

## 2018-06-18 NOTE — ED ADULT NURSE NOTE - SKIN INTEGRITY
blackish discoloration to 3rd digit right foot, sorrounding skin is red and painful to touch, blister noted on top of the right foot. PAtient is Left BKA

## 2018-06-18 NOTE — H&P ADULT - NSHPLABSRESULTS_GEN_ALL_CORE
< from: Xray Chest 1 View- PORTABLE-Urgent (06.18.18 @ 16:42) >    EXAM:  XR CHEST PORTABLE URGENT 1V                            PROCEDURE DATE:  06/18/2018          INTERPRETATION:  Admission.    AP chest. Prior 12/8/2017.    Low lung volumes. The right internal jugular line tip right atrium. No   change heart mediastinum. Worsening of the extensive bilateral airspace   disease in the mid and lower lung fields. Correlate for congestion and/or   infection. Small right and moderate left pleural effusion increased.    Impression: As above    < end of copied text >

## 2018-06-18 NOTE — ED PROVIDER NOTE - GASTROINTESTINAL, MLM
Abdomen soft, non-tender, no guarding. old well healed scar r upper abd vertical (from "childhood surgery")

## 2018-06-18 NOTE — H&P ADULT - NSHPATTENDINGPLANDISCUSS_GEN_ALL_CORE
med staff , Dr Dang , Dr Aranda ,outpatient PCP ,ER physician and Dr Dang .Spoke to the hcp NINA ( she called me back )

## 2018-06-18 NOTE — ED ADULT NURSE NOTE - CARDIO WDL
Normal rate, regular rhythm, normal S1, S2 heart sounds heard., patient with 2 lumen cath to right chest wall for dialysis

## 2018-06-18 NOTE — H&P ADULT - PMH
Anemia    ASHD (arteriosclerotic heart disease)    Cardiac arrest    CHF (congestive heart failure)    Controlled Type 2 Diabetes with Leg or Foot Ulcer    ESRD (end stage renal disease) on dialysis    ETOH Abuse    HTN (hypertension)    Peripheral Arterial Disease    TIA (transient ischemic attack)

## 2018-06-18 NOTE — CONSULT NOTE ADULT - ASSESSMENT
pt sent in to from [facility] hx of htn pvd dm ckd anemia resp failure cardiac arrest, tia cva on hd sent for a "blood clot in artery of r foot"	per note from snf:  of hallux 2/3/4 no trauma. decreased to no pulses of foot , 	with esrd on hd
Patient                                             JAIME TRAMMELL [facility]    ALLERGY nka  CONTACT Swati JACQUES [phone] self [phone]  REASON FOR VISIT   Initial evaluation/Pulmonary consultation requested  6/18/2018 by Dr Gomes from Dr Ball   Patient examined chart reviewed  HOSPITAL ADMISSION [facility] Dr Gomes 6/18/2018   PATIENT CAME  FROM (if information available)        PAST MEDICAL & SURGICAL HISTORY:  Past Medical History:  Anemia    ASHD (arteriosclerotic heart disease)    Cardiac arrest    CHF (congestive heart failure)    Controlled Type 2 Diabetes with Leg or Foot Ulcer    ESRD (end stage renal disease) on dialysis    ETOH Abuse    HTN (hypertension)    Peripheral Arterial Disease    TIA (transient ischemic attack).     Past Surgical History:  Amputation, Below Knee, Unilateral, Traumatic.    PATIENT DESCRIPTION 6/18/2018 ADMISSION [facility]  62 year old male with a history of HTN, DM, ESRD on HD, alcohol abuse, PAD s/p left BKA, cardiac arrest 11/2017, mild AS who presents for evaluation of right foot and concern for ischemic foot. The patient is a poor historian. He denies chest pain, shortness of breath. There is right foot pain.  The patient had a prior left BKA amputation.. He has a prosthesis for his left leg but is essentially nonambulatory. Arterial dopplers confirm multilevel occlusive disease of his right leg patient was admitted [facility] 6/18 with pain R foot and was seen by Vasc surg who felt that pt needs RAKA scheduled for 6/20    VITALS/LABS  6/18/2018 84 140/57 16 97%   6/18/2018 CXR bl effsns sabrina l cm R sided catheter  6/18/2018 W 15.8 Hb 9.3 Plt 329  na 132 K 3.6 CO2 28 Cr 4.5        PATIENT DESCRIPTION 6/18/2018 ADMISSION [facility]  62 year old male with a history of HTN, DM, ESRD on HD, alcohol abuse, PAD s/p left BKA, cardiac arrest 11/2017, mild AS  prior left BKA amputation.. He has a prosthesis for his left leg but is essentially nonambulatory. Arterial dopplers confirm multilevel occlusive disease of his right leg patient was admitted [facility] 6/18 with pain R foot and was seen by Vasc surg who felt that pt needs RAKA scheduled for 6/20 CXR showed bl effsns poss airsapce opac Pt had leukocutyosis and a creat 4.5 Pulm consulted 6/18/2018 becaause of dyspnea    PROBLEMS ASSESSMENT PLAN 6/18/2018 ADMISSION [facility]  RESP   6/18 resp distress likely sec to fluid ol Is getting HD  6/18/2018 Monitor PO Target po 90-95%  CAD  11/30/2017 ECHO ef 65%   ASA 81 (6/18)   metoporolol 50.3 (6/18)   HTN  Norvasc 5 (6/18)   Hydralazine 10.2 (6/18)   ESRD  HD ordered for 6/18  PVD PAIN FOOT  On MS Percocet  For RAKA 6/20  Cleared from Pulm standpoint    GLOBAL ISSUE/BEST PRACTICE:        PROBLEM: Analgesia:     na                        PROBLEM: Sedation:     na               PROBLEM: HOB elevation:   y            PROBLEM: Stress ulcer proph:    pepcid 20 (6/18)                       PROBLEM: VTE prophylaxis:      hsc (6/18)                  PROBLEM: Glycemic control:    na  PROBLEM: Nutrition:    cons carb renal chamorro dys 3 soft thin glucerna 1.2 (6/18)           PROBLEM: Advanced directive: na     PROBLEM: Allergies:  na    TIME SPENT Over 55 minutes aggregate care time spent on encounter; activities included   direct patient care, counseling and/or coordinating care reviewing notes, lab data/ imaging , discussion with multidisciplinary team/ patient  /family
62 y.o. male with multiple comorbidities presents with gangrene of his  right foot with severe multilevel PAD of his right leg. The patient needs a right AKA amputation. He will be scheduled for surgery  for Wednesday 6/20/18. The patient was given informed consent. I will reach his health care proxy (sister) for consent.
The patient is a 62 year old male with a history of HTN, DM, ESRD on HD, alcohol abuse, PAD s/p left BKA, cardiac arrest 11/2017, mild AS who presents for evaluation of right foot and concern for ischemic foot vs. gangrene.    Plan:  - Check ECG  - Vascular surgery evaluation  - Infectious work-up and check blood cultures  - Continue amlodipine 5 mg daily  - Continue metoprolol tartrate 50 mg q8h  - Continue hydralazine 10 mg bid  - Echocardiogram from 2017 with normal LV systolic function, mild AS  - CXR with pleural effusions and pulmonary edema - likely will need HD. Renal evaluation.  - The patient is at high risk for cardiac events. However, given the appearance of the foot, he will likely need emergent surgery and should proceed. Will need close post-operative monitoring, possibly in an ICU setting.

## 2018-06-18 NOTE — ED PROVIDER NOTE - CONSTITUTIONAL, MLM
normal... chronically ill w male who leans to r side speaks softly, thin, awake, alert, oriented to person, place, time/situation and in no apparent distress.

## 2018-06-18 NOTE — ED PROVIDER NOTE - CARDIAC, MLM
Normal rate, regular rhythm. lawanda decreased pulses inguinal regions and no pulses in foot (there is a large blister over dp region and not assessed very poor cap refill r foot and no pt

## 2018-06-18 NOTE — ED PROVIDER NOTE - MEDICAL DECISION MAKING DETAILS
review snf papers consult vascular pmd and admit for care iv abx in event of infection or osteo manage pain

## 2018-06-18 NOTE — H&P ADULT - PROBLEM SELECTOR PLAN 1
pain management , vascular sx evaluation called Admitted for septic workup and evaluation,send blood and urine cx,serial lactate levels,monitor vitals martita grimes hydration,monitor urine output and renal profile,iv abx initiated -vancomycin given

## 2018-06-18 NOTE — H&P ADULT - HISTORY OF PRESENT ILLNESS
pt sent in to from [facility] hx of htn pvd dm ckd anemia resp failure cardiac arrest, tia cva on hd sent for a "blood clot in artery of r foot"  	per note from snf: r foot pain with discoloration of hallux 2/3/4 no trauma. decreased to no pulses of foot  	arterial doppler: diffuse monophasic waveforms-can be associated with stenosis of the inflow at the level of iliacs  	xray: no fx dated 6/17  pt stated sx began 2 days ago or so

## 2018-06-18 NOTE — CONSULT NOTE ADULT - PROBLEM SELECTOR RECOMMENDATION 9
Excess fluids and waste products will be removed from your blood; your electrolytes will be balanced; your blood pressure will be controlled.

## 2018-06-19 ENCOUNTER — TRANSCRIPTION ENCOUNTER (OUTPATIENT)
Age: 63
End: 2018-06-19

## 2018-06-19 LAB
ABO RH CONFIRMATION: SIGNIFICANT CHANGE UP
ALBUMIN SERPL ELPH-MCNC: 2.2 G/DL — LOW (ref 3.3–5)
ALP SERPL-CCNC: 222 U/L — HIGH (ref 40–120)
ALT FLD-CCNC: 23 U/L — SIGNIFICANT CHANGE UP (ref 12–78)
ANION GAP SERPL CALC-SCNC: 12 MMOL/L — SIGNIFICANT CHANGE UP (ref 5–17)
AST SERPL-CCNC: 24 U/L — SIGNIFICANT CHANGE UP (ref 15–37)
BASE EXCESS BLDV CALC-SCNC: 3.7 MMOL/L — HIGH (ref -2–2)
BASOPHILS # BLD AUTO: 0.05 K/UL — SIGNIFICANT CHANGE UP (ref 0–0.2)
BASOPHILS NFR BLD AUTO: 0.3 % — SIGNIFICANT CHANGE UP (ref 0–2)
BILIRUB SERPL-MCNC: 0.5 MG/DL — SIGNIFICANT CHANGE UP (ref 0.2–1.2)
BUN SERPL-MCNC: 56 MG/DL — HIGH (ref 7–23)
CALCIUM SERPL-MCNC: 8 MG/DL — LOW (ref 8.5–10.1)
CHLORIDE SERPL-SCNC: 95 MMOL/L — LOW (ref 96–108)
CO2 SERPL-SCNC: 28 MMOL/L — SIGNIFICANT CHANGE UP (ref 22–31)
CREAT SERPL-MCNC: 4.9 MG/DL — HIGH (ref 0.5–1.3)
EOSINOPHIL # BLD AUTO: 0.06 K/UL — SIGNIFICANT CHANGE UP (ref 0–0.5)
EOSINOPHIL NFR BLD AUTO: 0.4 % — SIGNIFICANT CHANGE UP (ref 0–6)
GLUCOSE SERPL-MCNC: 209 MG/DL — HIGH (ref 70–99)
HBA1C BLD-MCNC: 7.4 % — HIGH (ref 4–5.6)
HCO3 BLDV-SCNC: 27 MMOL/L — SIGNIFICANT CHANGE UP (ref 21–29)
HCT VFR BLD CALC: 26.5 % — LOW (ref 39–50)
HGB BLD-MCNC: 8.8 G/DL — LOW (ref 13–17)
HOROWITZ INDEX BLDV+IHG-RTO: 21 — SIGNIFICANT CHANGE UP
IMM GRANULOCYTES NFR BLD AUTO: 0.5 % — SIGNIFICANT CHANGE UP (ref 0–1.5)
INR BLD: 1.46 RATIO — HIGH (ref 0.88–1.16)
LYMPHOCYTES # BLD AUTO: 0.54 K/UL — LOW (ref 1–3.3)
LYMPHOCYTES # BLD AUTO: 3.2 % — LOW (ref 13–44)
MAGNESIUM SERPL-MCNC: 2.3 MG/DL — SIGNIFICANT CHANGE UP (ref 1.6–2.6)
MCHC RBC-ENTMCNC: 27.8 PG — SIGNIFICANT CHANGE UP (ref 27–34)
MCHC RBC-ENTMCNC: 33.2 GM/DL — SIGNIFICANT CHANGE UP (ref 32–36)
MCV RBC AUTO: 83.9 FL — SIGNIFICANT CHANGE UP (ref 80–100)
MONOCYTES # BLD AUTO: 0.54 K/UL — SIGNIFICANT CHANGE UP (ref 0–0.9)
MONOCYTES NFR BLD AUTO: 3.2 % — SIGNIFICANT CHANGE UP (ref 2–14)
NEUTROPHILS # BLD AUTO: 15.46 K/UL — HIGH (ref 1.8–7.4)
NEUTROPHILS NFR BLD AUTO: 92.4 % — HIGH (ref 43–77)
PCO2 BLDV: 43 MMHG — SIGNIFICANT CHANGE UP (ref 35–50)
PH BLDV: 7.42 — SIGNIFICANT CHANGE UP (ref 7.35–7.45)
PHOSPHATE SERPL-MCNC: 2.6 MG/DL — SIGNIFICANT CHANGE UP (ref 2.5–4.5)
PLATELET # BLD AUTO: 310 K/UL — SIGNIFICANT CHANGE UP (ref 150–400)
PO2 BLDV: 30 MMHG — SIGNIFICANT CHANGE UP (ref 25–45)
POTASSIUM SERPL-MCNC: 4 MMOL/L — SIGNIFICANT CHANGE UP (ref 3.5–5.3)
POTASSIUM SERPL-SCNC: 4 MMOL/L — SIGNIFICANT CHANGE UP (ref 3.5–5.3)
PROCALCITONIN SERPL-MCNC: 1.3 NG/ML — HIGH (ref 0–0.04)
PROT SERPL-MCNC: 6.5 G/DL — SIGNIFICANT CHANGE UP (ref 6–8.3)
PROTHROM AB SERPL-ACNC: 16 SEC — HIGH (ref 9.8–12.7)
RBC # BLD: 3.16 M/UL — LOW (ref 4.2–5.8)
RBC # FLD: 16.5 % — HIGH (ref 10.3–14.5)
SAO2 % BLDV: 56 % — LOW (ref 67–88)
SODIUM SERPL-SCNC: 135 MMOL/L — SIGNIFICANT CHANGE UP (ref 135–145)
WBC # BLD: 16.74 K/UL — HIGH (ref 3.8–10.5)
WBC # FLD AUTO: 16.74 K/UL — HIGH (ref 3.8–10.5)

## 2018-06-19 PROCEDURE — 93010 ELECTROCARDIOGRAM REPORT: CPT | Mod: 77

## 2018-06-19 PROCEDURE — 93010 ELECTROCARDIOGRAM REPORT: CPT

## 2018-06-19 RX ORDER — ERYTHROPOIETIN 10000 [IU]/ML
10000 INJECTION, SOLUTION INTRAVENOUS; SUBCUTANEOUS
Qty: 0 | Refills: 0 | Status: DISCONTINUED | OUTPATIENT
Start: 2018-06-19 | End: 2018-06-26

## 2018-06-19 RX ORDER — VANCOMYCIN HCL 1 G
500 VIAL (EA) INTRAVENOUS ONCE
Qty: 0 | Refills: 0 | Status: COMPLETED | OUTPATIENT
Start: 2018-06-19 | End: 2018-06-19

## 2018-06-19 RX ORDER — METOPROLOL TARTRATE 50 MG
50 TABLET ORAL
Qty: 0 | Refills: 0 | Status: DISCONTINUED | OUTPATIENT
Start: 2018-06-19 | End: 2018-06-19

## 2018-06-19 RX ORDER — METOPROLOL TARTRATE 50 MG
50 TABLET ORAL EVERY 8 HOURS
Qty: 0 | Refills: 0 | Status: DISCONTINUED | OUTPATIENT
Start: 2018-06-19 | End: 2018-06-26

## 2018-06-19 RX ORDER — PIPERACILLIN AND TAZOBACTAM 4; .5 G/20ML; G/20ML
3.38 INJECTION, POWDER, LYOPHILIZED, FOR SOLUTION INTRAVENOUS EVERY 12 HOURS
Qty: 0 | Refills: 0 | Status: DISCONTINUED | OUTPATIENT
Start: 2018-06-19 | End: 2018-06-22

## 2018-06-19 RX ADMIN — Medication 325 MILLIGRAM(S): at 14:57

## 2018-06-19 RX ADMIN — AMLODIPINE BESYLATE 5 MILLIGRAM(S): 2.5 TABLET ORAL at 06:29

## 2018-06-19 RX ADMIN — Medication 50 MILLIGRAM(S): at 00:14

## 2018-06-19 RX ADMIN — Medication 10 MILLIGRAM(S): at 06:29

## 2018-06-19 RX ADMIN — Medication 50 MILLIGRAM(S): at 06:29

## 2018-06-19 RX ADMIN — Medication 2: at 08:30

## 2018-06-19 RX ADMIN — Medication 100 MILLIGRAM(S): at 22:05

## 2018-06-19 RX ADMIN — FAMOTIDINE 20 MILLIGRAM(S): 10 INJECTION INTRAVENOUS at 14:58

## 2018-06-19 RX ADMIN — Medication 10 MILLIGRAM(S): at 17:41

## 2018-06-19 RX ADMIN — Medication 50 MILLIGRAM(S): at 14:58

## 2018-06-19 RX ADMIN — Medication 50 MILLIGRAM(S): at 21:23

## 2018-06-19 RX ADMIN — OXYCODONE AND ACETAMINOPHEN 2 TABLET(S): 5; 325 TABLET ORAL at 06:27

## 2018-06-19 RX ADMIN — Medication 3 MILLIGRAM(S): at 21:23

## 2018-06-19 RX ADMIN — ERYTHROPOIETIN 10000 UNIT(S): 10000 INJECTION, SOLUTION INTRAVENOUS; SUBCUTANEOUS at 17:38

## 2018-06-19 NOTE — DIETITIAN INITIAL EVALUATION ADULT. - DIET TYPE
glucerna BID/consistent carbohydrate renal with evening snacks/dysphagia 3, soft, thin liquids/supplement (specify)

## 2018-06-19 NOTE — PROVIDER CONTACT NOTE (OTHER) - SITUATION
As per tele tech pt 's and O2 84%. Pt took off O2. O2 NC reapplied. O2 rechecked 95%. Pt is on metoprolol. next dose due at 2200. Pt has not voided all day reported by NA. Pt has poor appetite

## 2018-06-19 NOTE — PROVIDER CONTACT NOTE (OTHER) - SITUATION
pt still in afib, still asymtomatic. Dr. gonzalez, cardiologist, did not call back. pt received bedtime dose of metoprolol 50mg. PO

## 2018-06-19 NOTE — PROGRESS NOTE ADULT - SUBJECTIVE AND OBJECTIVE BOX
PATIENT DESCRIPTION 6/18/2018 ADMISSION [facility]  62 year old male with a history of HTN, DM, ESRD on HD, alcohol abuse, PAD s/p left BKA, cardiac arrest 11/2017, mild AS  prior left BKA amputation.. He has a prosthesis for his left leg but is essentially nonambulatory. Arterial dopplers confirm multilevel occlusive disease of his right leg patient was admitted [facility] 6/18 with pain R foot and was seen by Vasc surg who felt that pt needs RAKA scheduled for 6/20 CXR showed bl effsns poss airsapce opac Pt had leukocutyosis and a creat 4.5 Pulm consulted 6/18/2018 becaause of dyspnea      VITALS/LABS  6/19/2018 afeb 111 150/80 18 96%  6/19/2018 W 16.7 Hb 8.8 Plt 310 Na 135 K 4 CO2 28 Cr 4.9     REVIEW OF SYMPTOMS    Able to give ROS  Confused unable to give much ros    PHYSICAL EXAM  sp old bka  HEENT Unremarkable PERRLA atraumatic   RESP Fair air entry EXP prolonged    Harsh breath sound Resp distres mild   CARDIAC S1 S2 No S3     NO JVD    ABDOMEN SOFT BS PRESENT NOT DISTENDED No hepatosplenomegaly PEDAL EDEMA present No calf tenderness  NO rash   GENERAL Not TOXIC looking    PROBLEMS ASSESSMENT PLAN 6/18/2018 ADMISSION [facility]  RESP   6/18 resp distress likely sec to fluid ol Is getting HD  6/18/2018 Monitor PO Target po 90-95%  INFECTION  6/18-6/19 W 15.8-16.7   6/18 pct 1.3   Vanco given 6/18  CAD  11/30/2017 ECHO ef 65%   ASA 81 (6/18)   metoporolol 50.3 (6/18)   HTN  Norvasc 5 (6/18)   Hydralazine 10.2 (6/18)   ESRD  HD ordered for 6/18  PVD PAIN FOOT  On MS Percocet  For RAKA 6/20  Cleared from Pulm standpoint is in optimal shape and benefits exceed risks   VANCO LEVEL MONITROING   6/19 Vanco 17         GLOBAL ISSUE/BEST PRACTICE:        PROBLEM: Analgesia:     na                        PROBLEM: Sedation:     na               PROBLEM: HOB elevation:   y            PROBLEM: Stress ulcer proph:    pepcid 20 (6/18)                       PROBLEM: VTE prophylaxis:      hsc (6/18)                  PROBLEM: Glycemic control:    na  PROBLEM: Nutrition:    cons carb renal chamorro dys 3 soft thin glucerna 1.2 (6/18)           PROBLEM: Advanced directive: na     PROBLEM: Allergies:  na    TIME SPENT Over 25 minutes aggregate care time spent on encounter; activities included   direct patient care, counseling and/or coordinating care reviewing notes, lab data/ imaging , discussion with multidisciplinary team/ patient  /family

## 2018-06-19 NOTE — CHART NOTE - NSCHARTNOTEFT_GEN_A_CORE
I had a conversation with patient's sister today. The patient is scheduled for right above knee amputation tomorrow. The patient and his health care proxy, his sister Concepcion were given informed consent.

## 2018-06-19 NOTE — DIETITIAN INITIAL EVALUATION ADULT. - NS AS NUTRI INTERV MEALS SNACK
Carbohydrate - modified diet/Protein - modified diet/Mineral - modified diet/continue diet as ordered after OR change to nepro BID

## 2018-06-19 NOTE — CONSULT NOTE ADULT - CONSULT REASON
resp distess
Evaluation for R foot gangrene
Gangrene right foot
Ischemic foot, pre-operative evaluation
esrd on hd

## 2018-06-19 NOTE — DIETITIAN INITIAL EVALUATION ADULT. - OTHER INFO
unable to interview patient who is seen on dialysis supplement ordered at this time. for Right AKA 6/20

## 2018-06-19 NOTE — PROGRESS NOTE ADULT - SUBJECTIVE AND OBJECTIVE BOX
Chief Complaint: Right foot gangrene    Interval Events: Noted to be in AF overnight.    Review of Systems:  General: No fevers, chills, weight loss or gain  Skin: No rashes, color changes  Cardiovascular: No chest pain, orthopnea  Respiratory: No shortness of breath, cough  Gastrointestinal: No nausea, abdominal pain  Genitourinary: No incontinence, pain with urination  Musculoskeletal: No pain, swelling, decreased range of motion  Neurological: No headache, weakness  Psychiatric: No depression, anxiety  Endocrine: No weight loss or gain, increased thirst  All other systems are comprehensively negative.    Physical Exam:  Vital Signs Last 24 Hrs  T(C): 37.1 (19 Jun 2018 05:32), Max: 37.2 (18 Jun 2018 16:08)  T(F): 98.8 (19 Jun 2018 05:32), Max: 98.9 (18 Jun 2018 16:08)  HR: 106 (19 Jun 2018 05:32) (84 - 115)  BP: 158/109 (19 Jun 2018 05:32) (129/86 - 158/109)  BP(mean): --  RR: 18 (19 Jun 2018 05:32) (15 - 18)  SpO2: 91% (19 Jun 2018 05:32) (91% - 97%)  General: NAD  HEENT: MMM  Neck: No JVD, no carotid bruit  Lungs: CTAB  CV: Irregular, nl S1/S2, no M/R/G  Abdomen: S/NT/ND, +BS  Extremities: No LE edema, no cyanosis  Neuro: AAOx3, non-focal  Skin: No rash    Labs:                        8.8    16.74 )-----------( 310      ( 19 Jun 2018 06:36 )             26.5     06-19    135  |  95<L>  |  56<H>  ----------------------------<  209<H>  4.0   |  28  |  4.90<H>    Ca    8.0<L>      19 Jun 2018 06:36  Phos  2.6     06-19  Mg     2.3     06-19    TPro  6.5  /  Alb  2.2<L>  /  TBili  0.5  /  DBili  x   /  AST  24  /  ALT  23  /  AlkPhos  222<H>  06-19        PT/INR - ( 19 Jun 2018 06:36 )   PT: 16.0 sec;   INR: 1.46 ratio         PTT - ( 18 Jun 2018 16:56 )  PTT:33.2 sec    Telemetry: AF rates

## 2018-06-19 NOTE — PROGRESS NOTE ADULT - SUBJECTIVE AND OBJECTIVE BOX
Patient is a 62y Male whom presented to the hospital with esrd on hd     PAST MEDICAL & SURGICAL HISTORY:  TIA (transient ischemic attack)  ESRD (end stage renal disease) on dialysis  ASHD (arteriosclerotic heart disease)  Cardiac arrest  CHF (congestive heart failure)  Anemia  HTN (hypertension)  Peripheral Arterial Disease  Controlled Type 2 Diabetes with Leg or Foot Ulcer  ETOH Abuse  Amputation, Below Knee, Unilateral, Traumatic      MEDICATIONS  (STANDING):  aspirin  chewable 81 milliGRAM(s) Oral daily  dextrose 5%. 1000 milliLiter(s) (50 mL/Hr) IV Continuous <Continuous>  dextrose 50% Injectable 12.5 Gram(s) IV Push once  dextrose 50% Injectable 25 Gram(s) IV Push once  dextrose 50% Injectable 25 Gram(s) IV Push once  famotidine    Tablet 20 milliGRAM(s) Oral daily  ferrous    sulfate 325 milliGRAM(s) Oral daily  heparin  Injectable 5000 Unit(s) SubCutaneous every 12 hours  hydrALAZINE 10 milliGRAM(s) Oral two times a day  insulin lispro (HumaLOG) corrective regimen sliding scale   SubCutaneous three times a day before meals  melatonin 3 milliGRAM(s) Oral at bedtime  metoprolol tartrate 50 milliGRAM(s) Oral every 8 hours        REVIEW OF SYSTEMS:    CONSTITUTIONAL: No weakness, fevers or chills  EYES/ENT:   no throat pain   NECK: No pain or stiffness  RESPIRATORY: no cough, wheezing, hemoptysis; pos  shortness of breath  CARDIOVASCULAR: No chest pain or palpitations  GASTROINTESTINAL: No abdominal or epigastric pain. No nausea, vomiting,     No diarrhea or constipation. No melena   SKIN: dry  , DISCOLORATION OF R  FOREFOOT  ,S/P L BKA                                          8.8    16.74 )-----------( 310      ( 19 Jun 2018 06:36 )             26.5       CBC Full  -  ( 19 Jun 2018 06:36 )  WBC Count : 16.74 K/uL  Hemoglobin : 8.8 g/dL  Hematocrit : 26.5 %  Platelet Count - Automated : 310 K/uL  Mean Cell Volume : 83.9 fl  Mean Cell Hemoglobin : 27.8 pg  Mean Cell Hemoglobin Concentration : 33.2 gm/dL  Auto Neutrophil # : 15.46 K/uL  Auto Lymphocyte # : 0.54 K/uL  Auto Monocyte # : 0.54 K/uL  Auto Eosinophil # : 0.06 K/uL  Auto Basophil # : 0.05 K/uL  Auto Neutrophil % : 92.4 %  Auto Lymphocyte % : 3.2 %  Auto Monocyte % : 3.2 %  Auto Eosinophil % : 0.4 %  Auto Basophil % : 0.3 %      06-19    135  |  95<L>  |  56<H>  ----------------------------<  209<H>  4.0   |  28  |  4.90<H>    Ca    8.0<L>      19 Jun 2018 06:36  Phos  2.6     06-19  Mg     2.3     06-19    TPro  6.5  /  Alb  2.2<L>  /  TBili  0.5  /  DBili  x   /  AST  24  /  ALT  23  /  AlkPhos  222<H>  06-19      CAPILLARY BLOOD GLUCOSE      POCT Blood Glucose.: 120 mg/dL (19 Jun 2018 11:51)  POCT Blood Glucose.: 222 mg/dL (19 Jun 2018 07:58)      Vital Signs Last 24 Hrs  T(C): 37 (19 Jun 2018 09:55), Max: 37.2 (18 Jun 2018 16:08)  T(F): 98.6 (19 Jun 2018 09:55), Max: 98.9 (18 Jun 2018 16:08)  HR: 111 (19 Jun 2018 09:55) (84 - 115)  BP: 153/80 (19 Jun 2018 09:55) (129/86 - 158/109)  BP(mean): --  RR: 18 (19 Jun 2018 09:55) (15 - 18)  SpO2: 96% (19 Jun 2018 09:55) (91% - 97%)        PT/INR - ( 19 Jun 2018 06:36 )   PT: 16.0 sec;   INR: 1.46 ratio         PTT - ( 18 Jun 2018 16:56 )  PTT:33.2 sec         PHYSICAL EXAM:    Constitutional: NAD  HEENT: conjunctive   clear   Neck:  No JVD  Respiratory: CTAB  Cardiovascular: S1 and S2  Gastrointestinal: BS+, soft, NT/ND  Extremities: pos  peripheral edema  Neurological: , no focal deficits  Psychiatric: Normal mood, normal affect  : No Bauer  Skin: dry ,  DISCOLORATION OF R  FOREFOOT  ,S/P L BKALABS:

## 2018-06-19 NOTE — PROGRESS NOTE ADULT - SUBJECTIVE AND OBJECTIVE BOX
LATE ENTRY- patient was seen and examined earlier today . Plan of care was discussed with med staff and unit coordinator . Chart and available morning labs /imaging are reviewed electronically , urgent issues addressed . More information  is being added upon completion of rounds , when more information is collected and management discussed with consultants , medical staff and social service/case management on the floor Chief Complaint: Right foot gangrene  Patient seen in HD UNIT ,plan of care d/w the patient ,he is aware of surgery planned for am   Interval Events: Noted to be in AF overnight.  Patient is resting in a bed comfortably .Pain is well controlled No distress noted   Review of Systems:  General: No fevers, chills, weight loss or gain  Skin: No rashes, color changes  Cardiovascular: No chest pain, orthopnea  Respiratory: No shortness of breath, cough  Gastrointestinal: No nausea, abdominal pain  Genitourinary: No incontinence, pain with urination  Musculoskeletal: No pain, swelling, decreased range of motion  Neurological: No headache, weakness  Psychiatric: No depression, anxiety  Endocrine: No weight loss or gain, increased thirst  All other systems are comprehensively negative.    Physical Exam:  Vital Signs Last 24 Hrs  T(C): 37.1 (19 Jun 2018 05:32), Max: 37.2 (18 Jun 2018 16:08)  T(F): 98.8 (19 Jun 2018 05:32), Max: 98.9 (18 Jun 2018 16:08)  HR: 106 (19 Jun 2018 05:32) (84 - 115)  BP: 158/109 (19 Jun 2018 05:32) (129/86 - 158/109)  BP(mean): --  RR: 18 (19 Jun 2018 05:32) (15 - 18)  SpO2: 91% (19 Jun 2018 05:32) (91% - 97%)  General: NAD  HEENT: MMM  Neck: No JVD, no carotid bruit  Lungs: CTAB  CV: Irregular, nl S1/S2, no M/R/G  Abdomen: S/NT/ND, +BS  Extremities: R forefoot edema with bluish discoloration of hallux and 2-4 th toes and large blister of dorsum of foot   Neuro: AAOx3, non-focal  Skin:  R foot as above     Labs:                        8.8    16.74 )-----------( 310      ( 19 Jun 2018 06:36 )             26.5     06-19    135  |  95<L>  |  56<H>  ----------------------------<  209<H>  4.0   |  28  |  4.90<H>    Ca    8.0<L>      19 Jun 2018 06:36  Phos  2.6     06-19  Mg     2.3     06-19    TPro  6.5  /  Alb  2.2<L>  /  TBili  0.5  /  DBili  x   /  AST  24  /  ALT  23  /  AlkPhos  222<H>  06-19        PT/INR - ( 19 Jun 2018 06:36 )   PT: 16.0 sec;   INR: 1.46 ratio         PTT - ( 18 Jun 2018 16:56 )  PTT:33.2 sec  Labs and imaging reviewed electronically

## 2018-06-20 ENCOUNTER — RESULT REVIEW (OUTPATIENT)
Age: 63
End: 2018-06-20

## 2018-06-20 LAB
ALBUMIN SERPL ELPH-MCNC: 2.1 G/DL — LOW (ref 3.3–5)
ALP SERPL-CCNC: 194 U/L — HIGH (ref 40–120)
ALT FLD-CCNC: 20 U/L — SIGNIFICANT CHANGE UP (ref 12–78)
ANION GAP SERPL CALC-SCNC: 13 MMOL/L — SIGNIFICANT CHANGE UP (ref 5–17)
APPEARANCE UR: ABNORMAL
AST SERPL-CCNC: 29 U/L — SIGNIFICANT CHANGE UP (ref 15–37)
BACTERIA # UR AUTO: ABNORMAL
BILIRUB SERPL-MCNC: 0.6 MG/DL — SIGNIFICANT CHANGE UP (ref 0.2–1.2)
BILIRUB UR-MCNC: NEGATIVE — SIGNIFICANT CHANGE UP
BUN SERPL-MCNC: 33 MG/DL — HIGH (ref 7–23)
CALCIUM SERPL-MCNC: 8 MG/DL — LOW (ref 8.5–10.1)
CHLORIDE SERPL-SCNC: 99 MMOL/L — SIGNIFICANT CHANGE UP (ref 96–108)
CO2 SERPL-SCNC: 26 MMOL/L — SIGNIFICANT CHANGE UP (ref 22–31)
COLOR SPEC: YELLOW — SIGNIFICANT CHANGE UP
COMMENT - URINE: SIGNIFICANT CHANGE UP
CREAT SERPL-MCNC: 3.6 MG/DL — HIGH (ref 0.5–1.3)
DIFF PNL FLD: ABNORMAL
EPI CELLS # UR: ABNORMAL
GLUCOSE SERPL-MCNC: 141 MG/DL — HIGH (ref 70–99)
GLUCOSE UR QL: 300 MG/DL
GRAN CASTS # UR COMP ASSIST: ABNORMAL /LPF
HBV CORE AB SER-ACNC: SIGNIFICANT CHANGE UP
HBV SURFACE AB SER-ACNC: 27.2 MIU/ML — SIGNIFICANT CHANGE UP
HBV SURFACE AG SER-ACNC: SIGNIFICANT CHANGE UP
HCT VFR BLD CALC: 27.2 % — LOW (ref 39–50)
HGB BLD-MCNC: 8.8 G/DL — LOW (ref 13–17)
INR BLD: 1.63 RATIO — HIGH (ref 0.88–1.16)
KETONES UR-MCNC: ABNORMAL
LEUKOCYTE ESTERASE UR-ACNC: ABNORMAL
MAGNESIUM SERPL-MCNC: 2.1 MG/DL — SIGNIFICANT CHANGE UP (ref 1.6–2.6)
MCHC RBC-ENTMCNC: 27.6 PG — SIGNIFICANT CHANGE UP (ref 27–34)
MCHC RBC-ENTMCNC: 32.4 GM/DL — SIGNIFICANT CHANGE UP (ref 32–36)
MCV RBC AUTO: 85.3 FL — SIGNIFICANT CHANGE UP (ref 80–100)
NITRITE UR-MCNC: NEGATIVE — SIGNIFICANT CHANGE UP
NRBC # BLD: 0 /100 WBCS — SIGNIFICANT CHANGE UP (ref 0–0)
PH UR: 6 — SIGNIFICANT CHANGE UP (ref 5–8)
PHOSPHATE SERPL-MCNC: 3.1 MG/DL — SIGNIFICANT CHANGE UP (ref 2.5–4.5)
PLATELET # BLD AUTO: 256 K/UL — SIGNIFICANT CHANGE UP (ref 150–400)
POTASSIUM SERPL-MCNC: 3.7 MMOL/L — SIGNIFICANT CHANGE UP (ref 3.5–5.3)
POTASSIUM SERPL-SCNC: 3.7 MMOL/L — SIGNIFICANT CHANGE UP (ref 3.5–5.3)
PROT SERPL-MCNC: 6.3 G/DL — SIGNIFICANT CHANGE UP (ref 6–8.3)
PROT UR-MCNC: 500 MG/DL
PROTHROM AB SERPL-ACNC: 18 SEC — HIGH (ref 9.8–12.7)
RBC # BLD: 3.19 M/UL — LOW (ref 4.2–5.8)
RBC # FLD: 16.5 % — HIGH (ref 10.3–14.5)
RBC CASTS # UR COMP ASSIST: ABNORMAL /HPF (ref 0–4)
SODIUM SERPL-SCNC: 138 MMOL/L — SIGNIFICANT CHANGE UP (ref 135–145)
SP GR SPEC: 1.01 — SIGNIFICANT CHANGE UP (ref 1.01–1.02)
UROBILINOGEN FLD QL: NEGATIVE — SIGNIFICANT CHANGE UP
VANCOMYCIN FLD-MCNC: 20.5 UG/ML — SIGNIFICANT CHANGE UP
WBC # BLD: 17.25 K/UL — HIGH (ref 3.8–10.5)
WBC # FLD AUTO: 17.25 K/UL — HIGH (ref 3.8–10.5)
WBC UR QL: ABNORMAL

## 2018-06-20 RX ORDER — SODIUM CHLORIDE 9 MG/ML
1000 INJECTION INTRAMUSCULAR; INTRAVENOUS; SUBCUTANEOUS
Qty: 0 | Refills: 0 | Status: DISCONTINUED | OUTPATIENT
Start: 2018-06-20 | End: 2018-06-20

## 2018-06-20 RX ORDER — OXYCODONE HYDROCHLORIDE 5 MG/1
5 TABLET ORAL EVERY 4 HOURS
Qty: 0 | Refills: 0 | Status: DISCONTINUED | OUTPATIENT
Start: 2018-06-20 | End: 2018-06-20

## 2018-06-20 RX ORDER — MORPHINE SULFATE 50 MG/1
1 CAPSULE, EXTENDED RELEASE ORAL
Qty: 0 | Refills: 0 | Status: DISCONTINUED | OUTPATIENT
Start: 2018-06-20 | End: 2018-06-20

## 2018-06-20 RX ORDER — ONDANSETRON 8 MG/1
4 TABLET, FILM COATED ORAL ONCE
Qty: 0 | Refills: 0 | Status: DISCONTINUED | OUTPATIENT
Start: 2018-06-20 | End: 2018-06-20

## 2018-06-20 RX ADMIN — PIPERACILLIN AND TAZOBACTAM 25 GRAM(S): 4; .5 INJECTION, POWDER, LYOPHILIZED, FOR SOLUTION INTRAVENOUS at 06:07

## 2018-06-20 RX ADMIN — Medication 10 MILLIGRAM(S): at 06:07

## 2018-06-20 RX ADMIN — Medication 50 MILLIGRAM(S): at 06:07

## 2018-06-20 RX ADMIN — AMLODIPINE BESYLATE 5 MILLIGRAM(S): 2.5 TABLET ORAL at 06:07

## 2018-06-20 RX ADMIN — Medication 50 MILLIGRAM(S): at 22:28

## 2018-06-20 RX ADMIN — Medication 3 MILLIGRAM(S): at 22:28

## 2018-06-20 RX ADMIN — FAMOTIDINE 20 MILLIGRAM(S): 10 INJECTION INTRAVENOUS at 12:49

## 2018-06-20 RX ADMIN — Medication 1: at 07:21

## 2018-06-20 RX ADMIN — Medication 325 MILLIGRAM(S): at 12:49

## 2018-06-20 NOTE — PROGRESS NOTE ADULT - SUBJECTIVE AND OBJECTIVE BOX
Chief Complaint: Right foot gangrene    Interval Events: No events overnight. No complaints.    Review of Systems:  General: No fevers, chills, weight loss or gain  Skin: No rashes, color changes  Cardiovascular: No chest pain, orthopnea  Respiratory: No shortness of breath, cough  Gastrointestinal: No nausea, abdominal pain  Genitourinary: No incontinence, pain with urination  Musculoskeletal: No pain, swelling, decreased range of motion  Neurological: No headache, weakness  Psychiatric: No depression, anxiety  Endocrine: No weight loss or gain, increased thirst  All other systems are comprehensively negative.    Physical Exam:  Vital Signs Last 24 Hrs  T(C): 37.1 (19 Jun 2018 05:32), Max: 37.2 (18 Jun 2018 16:08)  T(F): 98.8 (19 Jun 2018 05:32), Max: 98.9 (18 Jun 2018 16:08)  HR: 106 (19 Jun 2018 05:32) (84 - 115)  BP: 158/109 (19 Jun 2018 05:32) (129/86 - 158/109)  BP(mean): --  RR: 18 (19 Jun 2018 05:32) (15 - 18)  SpO2: 91% (19 Jun 2018 05:32) (91% - 97%)  General: NAD  HEENT: MMM  Neck: No JVD, no carotid bruit  Lungs: CTAB  CV: Irregular, nl S1/S2, no M/R/G  Abdomen: S/NT/ND, +BS  Extremities: No LE edema, no cyanosis  Neuro: AAOx3, non-focal  Skin: No rash    Labs:                        8.8    16.74 )-----------( 310      ( 19 Jun 2018 06:36 )             26.5     06-19    135  |  95<L>  |  56<H>  ----------------------------<  209<H>  4.0   |  28  |  4.90<H>    Ca    8.0<L>      19 Jun 2018 06:36  Phos  2.6     06-19  Mg     2.3     06-19    TPro  6.5  /  Alb  2.2<L>  /  TBili  0.5  /  DBili  x   /  AST  24  /  ALT  23  /  AlkPhos  222<H>  06-19        PT/INR - ( 19 Jun 2018 06:36 )   PT: 16.0 sec;   INR: 1.46 ratio         PTT - ( 18 Jun 2018 16:56 )  PTT:33.2 sec    Telemetry: AF rates 100-110

## 2018-06-20 NOTE — BRIEF OPERATIVE NOTE - PROCEDURE
<<-----Click on this checkbox to enter Procedure AKA (above-knee amputation), right  06/20/2018    Active  ANEUDY

## 2018-06-20 NOTE — PROGRESS NOTE ADULT - SUBJECTIVE AND OBJECTIVE BOX
PROGRESS NOTE  Patient is a 62y old  Male who presents with a chief complaint of sent from [facility] for increasing pain in right foot, cold, pulseless (2018 01:12)  Chart and available morning labs /imaging are reviewed electronically , urgent issues addressed . More information  is being added upon completion of rounds , when more information is collected and management discussed with consultants , medical staff and social service/case management on the floor   No new issues reported by medical staff . All above noted   OVERNIGHT  Patient is resting in a bed comfortably .Confused ,poor mentation .No distress noted   Scheduled to OR today   HPI:  pt sent in to from [facility] hx of htn pvd dm ckd anemia resp failure cardiac arrest, tia cva on hd sent for a "blood clot in artery of r foot"  	per note from snf: r foot pain with discoloration of hallux 2/3/ no trauma. decreased to no pulses of foot  	arterial doppler: diffuse monophasic waveforms-can be associated with stenosis of the inflow at the level of iliacs  	xray: no fx dated   pt stated sx began 2 days ago or so (2018 17:30)    PAST MEDICAL & SURGICAL HISTORY:  TIA (transient ischemic attack)  ESRD (end stage renal disease) on dialysis  ASHD (arteriosclerotic heart disease)  Cardiac arrest  CHF (congestive heart failure)  Anemia  HTN (hypertension)  Peripheral Arterial Disease  Controlled Type 2 Diabetes with Leg or Foot Ulcer  ETOH Abuse  Amputation, Below Knee, Unilateral, Traumatic      MEDICATIONS  (STANDING):  dextrose 5%. 1000 milliLiter(s) (50 mL/Hr) IV Continuous <Continuous>  dextrose 50% Injectable 12.5 Gram(s) IV Push once  dextrose 50% Injectable 25 Gram(s) IV Push once  dextrose 50% Injectable 25 Gram(s) IV Push once  diltiazem    Tablet 30 milliGRAM(s) Oral every 8 hours  epoetin flower Injectable 77515 Unit(s) IV Push <User Schedule>  famotidine    Tablet 20 milliGRAM(s) Oral daily  ferrous    sulfate 325 milliGRAM(s) Oral daily  hydrALAZINE 10 milliGRAM(s) Oral two times a day  insulin lispro (HumaLOG) corrective regimen sliding scale   SubCutaneous three times a day before meals  melatonin 3 milliGRAM(s) Oral at bedtime  metoprolol tartrate 50 milliGRAM(s) Oral every 8 hours  piperacillin/tazobactam IVPB. 3.375 Gram(s) IV Intermittent every 12 hours    MEDICATIONS  (PRN):  acetaminophen   Tablet 650 milliGRAM(s) Oral every 6 hours PRN For Temp greater than 38 C (100.4 F)  acetaminophen   Tablet. 650 milliGRAM(s) Oral every 6 hours PRN Mild Pain (1 - 3)  dextrose 40% Gel 15 Gram(s) Oral once PRN Blood Glucose LESS THAN 70 milliGRAM(s)/deciliter  glucagon  Injectable 1 milliGRAM(s) IntraMuscular once PRN Glucose LESS THAN 70 milligrams/deciliter  morphine  - Injectable 2 milliGRAM(s) IV Push every 4 hours PRN Severe Pain (7 - 10)  oxyCODONE    5 mG/acetaminophen 325 mG 2 Tablet(s) Oral every 4 hours PRN Moderate Pain (4 - 6)      OBJECTIVE    T(C): 36.8 (18 @ 12:43), Max: 37.2 (18 @ 20:32)  HR: 103 (18 @ 12:43) (102 - 130)  BP: 106/69 (18 @ 12:43) (106/69 - 127/67)  RR: 18 (18 @ 12:43) (18 - 19)  SpO2: 96% (18 @ 12:43) (95% - 97%)  Wt(kg): --  I&O's Summary    2018 07:01  -  2018 07:00  --------------------------------------------------------  IN: 1100 mL / OUT: 2400 mL / NET: -1300 mL        ROS is unobtainable due to  confusion   General: NAD  HEENT: MMM  Neck: No JVD, no carotid bruit  Lungs: CTAB  CV: Irregular, nl S1/S2, no M/R/G  Abdomen: S/NT/ND, +BS  Extremities: R forefoot edema with bluish discoloration of hallux and 2-4 th toes and large blister of dorsum of foot   Neuro: AAOx3, non-focal  Skin:  R foot as above   LABS:                        8.8    17.25 )-----------( 256      ( 2018 07:03 )             27.2     06-20    138  |  99  |  33<H>  ----------------------------<  141<H>  3.7   |  26  |  3.60<H>    Ca    8.0<L>      2018 07:03  Phos  3.1     06-20  Mg     2.1     06-20    TPro  6.3  /  Alb  2.1<L>  /  TBili  0.6  /  DBili  x   /  AST  29  /  ALT  20  /  AlkPhos  194<H>  06-20    CAPILLARY BLOOD GLUCOSE      POCT Blood Glucose.: 109 mg/dL (2018 11:47)  POCT Blood Glucose.: 151 mg/dL (2018 06:31)  POCT Blood Glucose.: 141 mg/dL (2018 21:57)  POCT Blood Glucose.: 136 mg/dL (2018 17:12)    PT/INR - ( 2018 07:03 )   PT: 18.0 sec;   INR: 1.63 ratio         PTT - ( 2018 16:56 )  PTT:33.2 sec  Urinalysis Basic - ( 2018 07:48 )    Color: Yellow / Appearance: Slightly Turbid / S.010 / pH: x  Gluc: x / Ketone: Trace  / Bili: Negative / Urobili: Negative   Blood: x / Protein: 500 mg/dL / Nitrite: Negative   Leuk Esterase: Trace / RBC: 6-10 /HPF / WBC 26-50   Sq Epi: x / Non Sq Epi: Moderate / Bacteria: Many        Culture - Blood (collected 2018 09:16)  Source: .Blood Blood  Preliminary Report (2018 10:01):    No growth to date.    Culture - Blood (collected 2018 09:14)  Source: .Blood Blood  Preliminary Report (2018 10:01):    No growth to date.    Culture - Blood (collected 2018 22:27)  Source: .Blood Blood  Preliminary Report (2018 23:01):    No growth to date.    Culture - Blood (collected 2018 22:27)  Source: .Blood Blood  Preliminary Report (2018 23:01):    No growth to date.      RADIOLOGY & ADDITIONAL TESTS:< from: Xray Foot AP + Lateral + Oblique, Right (18 @ 16:59) >  EXAM:  FOOT RIGHT (MINIMUM 3 VIEWS)                            PROCEDURE DATE:  2018          INTERPRETATION:  RIGHT FOOT: AP, lateral, and oblique views    CLINICAL HISTORY: Right foot wound.    COMPARISON: None Available    FINDINGS:      There is marked soft tissue prominence along the dorsum of the foot at   the level of the metatarsals, correlate clinically.    The alignment at the tarsometatarsal joints remains within normal limits.  No focal osteolysis or acute fracture is noted.    There are prominent soft tissue vascular calcifications.    Impression:    < end of copied text >     reviewed elctronically  ASSESSMENT/PLAN:

## 2018-06-20 NOTE — PROGRESS NOTE ADULT - SUBJECTIVE AND OBJECTIVE BOX
Patient seen /examined today Plan discussed/Questions answered  (patient/ancillary staff/colleagues) Chart notes reviewd More detailed Pulm/Critical Care notes, assessment and plan  will be added later today as needed after reviewing further labs as they become available     Notable interval events reviewed    ROS/PE  . Patient seen /examined today Plan discussed/Questions answered  (patient/ancillary staff/colleagues) Chart notes reviewd More detailed Pulm/Critical Care notes, assessment and plan  will be added later today as needed after reviewing further labs as they become available     Notable interval events reviewed    ROS/PE  . VITALS/LABS  6/20/2018 W 17.2 Hb 8.8 Plt 256  Na 138 K 3.7  CO2 26 Cr 3.6   6/19/2018 W 16.7 Hb 8.8 Plt 310 Na 135 K 4 CO2 28 Cr 4.9     PATIENT DESCRIPTION 6/18/2018 ADMISSION [facility]  62 year old male with a history of HTN, DM, ESRD on HD, alcohol abuse, PAD s/p left BKA, cardiac arrest 11/2017, mild AS  prior left BKA amputation.. He has a prosthesis for his left leg but is essentially nonambulatory. Arterial dopplers confirm multilevel occlusive disease of his right leg patient was admitted [facility] 6/18 with pain R foot and was seen by Vasc surg who felt that pt needs RAKA scheduled for 6/20 CXR showed bl effsns poss airsapce opac Pt had leukocutyosis and a creat 4.5 Pulm consulted 6/18/2018 becaause of dyspnea    PROBLEMS ASSESSMENT PLAN 6/18/2018 ADMISSION [facility]  RESP   6/18 resp distress likely sec to fluid ol Is getting HD  6/18/2018 Monitor PO Target po 90-95%  INFECTION  6/18-6/19 W 15.8-16.7   6/18 pct 1.3   Vanco given 6/18  CAD  11/30/2017 ECHO ef 65%   ASA 81 (6/18)   metoporolol 50.3 (6/18)   HTN  Norvasc 5 (6/18)   Hydralazine 10.2 (6/18)   ESRD  HD ordered for 6/18  PVD PAIN FOOT  On MS Percocet  For RAKA 6/20  Cleared from Pulm standpoint is in optimal shape and benefits exceed risks   VANCO LEVEL MONITROING   6/19 Vanco 17     GLOBAL ISSUE/BEST PRACTICE:        PROBLEM: Analgesia:     na                        PROBLEM: Sedation:     na               PROBLEM: HOB elevation:   y            PROBLEM: Stress ulcer proph:    pepcid 20 (6/18)                       PROBLEM: VTE prophylaxis:      hsc (6/18)                  PROBLEM: Glycemic control:    na  PROBLEM: Nutrition:    cons carb renal chamorro dys 3 soft thin glucerna 1.2 (6/18)           PROBLEM: Advanced directive: na     PROBLEM: Allergies:  na    TIME SPENT Over 25 minutes aggregate care time spent on encounter; activities included   direct patient care, counseling and/or coordinating care reviewing notes, lab data/ imaging , discussion with multidisciplinary team/ patient  /family

## 2018-06-20 NOTE — PROGRESS NOTE ADULT - SUBJECTIVE AND OBJECTIVE BOX
Interval History:    CENTRAL LINE:   [  ] YES       [  ] NO  BARAJAS:                 [  ] YES       [  ] NO         REVIEW OF SYSTEMS:  All Systems below were reviewed and are negative [  ]  HEENT:  ID:  Pulmonary:  Cardiac:  GI:  Renal:  Musculoskeletal:  All other systems above were reviewed and are negative   [  ]      MEDICATIONS  (STANDING):  dextrose 5%. 1000 milliLiter(s) (50 mL/Hr) IV Continuous <Continuous>  dextrose 50% Injectable 12.5 Gram(s) IV Push once  dextrose 50% Injectable 25 Gram(s) IV Push once  dextrose 50% Injectable 25 Gram(s) IV Push once  diltiazem    Tablet 30 milliGRAM(s) Oral every 8 hours  epoetin flower Injectable 81957 Unit(s) IV Push <User Schedule>  famotidine    Tablet 20 milliGRAM(s) Oral daily  ferrous    sulfate 325 milliGRAM(s) Oral daily  hydrALAZINE 10 milliGRAM(s) Oral two times a day  insulin lispro (HumaLOG) corrective regimen sliding scale   SubCutaneous three times a day before meals  melatonin 3 milliGRAM(s) Oral at bedtime  metoprolol tartrate 50 milliGRAM(s) Oral every 8 hours  piperacillin/tazobactam IVPB. 3.375 Gram(s) IV Intermittent every 12 hours  sodium chloride 0.9%. 1000 milliLiter(s) (50 mL/Hr) IV Continuous <Continuous>    MEDICATIONS  (PRN):  acetaminophen   Tablet 650 milliGRAM(s) Oral every 6 hours PRN For Temp greater than 38 C (100.4 F)  acetaminophen   Tablet. 650 milliGRAM(s) Oral every 6 hours PRN Mild Pain (1 - 3)  dextrose 40% Gel 15 Gram(s) Oral once PRN Blood Glucose LESS THAN 70 milliGRAM(s)/deciliter  glucagon  Injectable 1 milliGRAM(s) IntraMuscular once PRN Glucose LESS THAN 70 milligrams/deciliter  morphine  - Injectable 1 milliGRAM(s) IV Push every 10 minutes PRN Moderate Pain (4 - 6)  morphine  - Injectable 2 milliGRAM(s) IV Push every 4 hours PRN Severe Pain (7 - 10)  ondansetron Injectable 4 milliGRAM(s) IV Push once PRN Nausea and/or Vomiting  oxyCODONE    5 mG/acetaminophen 325 mG 2 Tablet(s) Oral every 4 hours PRN Moderate Pain (4 - 6)  oxyCODONE    IR 5 milliGRAM(s) Oral every 4 hours PRN For moderate pain      Vital Signs Last 24 Hrs  T(C): 36.8 (20 Jun 2018 20:41), Max: 36.9 (20 Jun 2018 03:09)  T(F): 98.2 (20 Jun 2018 20:41), Max: 98.5 (20 Jun 2018 03:09)  HR: 85 (20 Jun 2018 21:00) (77 - 103)  BP: 117/54 (20 Jun 2018 21:00) (104/56 - 130/53)  BP(mean): --  RR: 16 (20 Jun 2018 21:00) (14 - 20)  SpO2: 96% (20 Jun 2018 21:00) (95% - 97%)    I&O's Summary    19 Jun 2018 07:01  -  20 Jun 2018 07:00  --------------------------------------------------------  IN: 1100 mL / OUT: 2400 mL / NET: -1300 mL        PHYSICAL EXAM:  HEENT: NC/AT; PERRLA  Neck: Soft; no tenderness  Lungs: CTA bilaterally; no wheezing.   Heart:  Abdomen:  Genital/ Rectal:  Extremities:  Neurologic:  Vascular:      LABORATORY:    CBC Full  -  ( 20 Jun 2018 07:03 )  WBC Count : 17.25 K/uL  Hemoglobin : 8.8 g/dL  Hematocrit : 27.2 %  Platelet Count - Automated : 256 K/uL  Mean Cell Volume : 85.3 fl  Mean Cell Hemoglobin : 27.6 pg  Mean Cell Hemoglobin Concentration : 32.4 gm/dL  Auto Neutrophil # : x  Auto Lymphocyte # : x  Auto Monocyte # : x  Auto Eosinophil # : x  Auto Basophil # : x  Auto Neutrophil % : x  Auto Lymphocyte % : x  Auto Monocyte % : x  Auto Eosinophil % : x  Auto Basophil % : x      ESR:                   06-18 @ 23:03  --    C-Reactive Protein:     06-18 @ 23:03  --    Procalcitonin:           06-18 @ 23:03   1.30      06-20    138  |  99  |  33<H>  ----------------------------<  141<H>  3.7   |  26  |  3.60<H>    Ca    8.0<L>      20 Jun 2018 07:03  Phos  3.1     06-20  Mg     2.1     06-20    TPro  6.3  /  Alb  2.1<L>  /  TBili  0.6  /  DBili  x   /  AST  29  /  ALT  20  /  AlkPhos  194<H>  06-20          Assessment and Plan:          Leo Schulz MD   [phone]. He had R AKA today.  No complications noted.     MEDICATIONS  (STANDING):  dextrose 5%. 1000 milliLiter(s) (50 mL/Hr) IV Continuous <Continuous>  dextrose 50% Injectable 12.5 Gram(s) IV Push once  dextrose 50% Injectable 25 Gram(s) IV Push once  dextrose 50% Injectable 25 Gram(s) IV Push once  diltiazem    Tablet 30 milliGRAM(s) Oral every 8 hours  epoetin flower Injectable 90139 Unit(s) IV Push <User Schedule>  famotidine    Tablet 20 milliGRAM(s) Oral daily  ferrous    sulfate 325 milliGRAM(s) Oral daily  hydrALAZINE 10 milliGRAM(s) Oral two times a day  insulin lispro (HumaLOG) corrective regimen sliding scale   SubCutaneous three times a day before meals  melatonin 3 milliGRAM(s) Oral at bedtime  metoprolol tartrate 50 milliGRAM(s) Oral every 8 hours  piperacillin/tazobactam IVPB. 3.375 Gram(s) IV Intermittent every 12 hours  sodium chloride 0.9%. 1000 milliLiter(s) (50 mL/Hr) IV Continuous <Continuous>    MEDICATIONS  (PRN):  acetaminophen   Tablet 650 milliGRAM(s) Oral every 6 hours PRN For Temp greater than 38 C (100.4 F)  acetaminophen   Tablet. 650 milliGRAM(s) Oral every 6 hours PRN Mild Pain (1 - 3)  dextrose 40% Gel 15 Gram(s) Oral once PRN Blood Glucose LESS THAN 70 milliGRAM(s)/deciliter  glucagon  Injectable 1 milliGRAM(s) IntraMuscular once PRN Glucose LESS THAN 70 milligrams/deciliter  morphine  - Injectable 1 milliGRAM(s) IV Push every 10 minutes PRN Moderate Pain (4 - 6)  morphine  - Injectable 2 milliGRAM(s) IV Push every 4 hours PRN Severe Pain (7 - 10)  ondansetron Injectable 4 milliGRAM(s) IV Push once PRN Nausea and/or Vomiting  oxyCODONE    5 mG/acetaminophen 325 mG 2 Tablet(s) Oral every 4 hours PRN Moderate Pain (4 - 6)  oxyCODONE    IR 5 milliGRAM(s) Oral every 4 hours PRN For moderate pain      Vital Signs Last 24 Hrs  T(C): 36.8 (20 Jun 2018 20:41), Max: 36.9 (20 Jun 2018 03:09)  T(F): 98.2 (20 Jun 2018 20:41), Max: 98.5 (20 Jun 2018 03:09)  HR: 85 (20 Jun 2018 21:00) (77 - 103)  BP: 117/54 (20 Jun 2018 21:00) (104/56 - 130/53)  BP(mean): --  RR: 16 (20 Jun 2018 21:00) (14 - 20)  SpO2: 96% (20 Jun 2018 21:00) (95% - 97%)    I&O's Summary    19 Jun 2018 07:01  -  20 Jun 2018 07:00  --------------------------------------------------------  IN: 1100 mL / OUT: 2400 mL / NET: -1300 mL    PHYSICAL EXAM:  HEENT: NC/AT; PERRLA  Neck: Soft; no tenderness  Lungs: Coarse BS  bilaterally; no wheezing.   Heart: RRR; no murmurs.   Abdomen: Soft; no masses.   Extremities: No ulcers.   Neurologic: Awake.       LABORATORY:    CBC Full  -  ( 20 Jun 2018 07:03 )  WBC Count : 17.25 K/uL  Hemoglobin : 8.8 g/dL  Hematocrit : 27.2 %  Platelet Count - Automated : 256 K/uL  Mean Cell Volume : 85.3 fl  Mean Cell Hemoglobin : 27.6 pg  Mean Cell Hemoglobin Concentration : 32.4 gm/dL  Auto Neutrophil # : x  Auto Lymphocyte # : x  Auto Monocyte # : x  Auto Eosinophil # : x  Auto Basophil # : x  Auto Neutrophil % : x  Auto Lymphocyte % : x  Auto Monocyte % : x  Auto Eosinophil % : x  Auto Basophil % : x      C-Reactive Protein:     06-18 @ 23:03  --    Procalcitonin:           06-18 @ 23:03   1.30      06-20    138  |  99  |  33<H>  ----------------------------<  141<H>  3.7   |  26  |  3.60<H>    Ca    8.0<L>      20 Jun 2018 07:03  Phos  3.1     06-20  Mg     2.1     06-20    TPro  6.3  /  Alb  2.1<L>  /  TBili  0.6  /  DBili  x   /  AST  29  /  ALT  20  /  AlkPhos  194<H>  06-20      Assessment and Plan:    1. R foot with gangrene,  2. Severe PAD.  3. ESRD on HD  4. UTI.    . Continue IV Zosyn.  . Follow all cultures.   . Wound care daily.         Leo Schulz MD   [phone].

## 2018-06-20 NOTE — PROGRESS NOTE ADULT - SUBJECTIVE AND OBJECTIVE BOX
Patient is a 62y Male whom presented to the hospital with esrd on hd     PAST MEDICAL & SURGICAL HISTORY:  TIA (transient ischemic attack)  ESRD (end stage renal disease) on dialysis  ASHD (arteriosclerotic heart disease)  Cardiac arrest  CHF (congestive heart failure)  Anemia  HTN (hypertension)  Peripheral Arterial Disease  Controlled Type 2 Diabetes with Leg or Foot Ulcer  ETOH Abuse  Amputation, Below Knee, Unilateral, Traumatic      MEDICATIONS  (STANDING):  aspirin  chewable 81 milliGRAM(s) Oral daily  dextrose 5%. 1000 milliLiter(s) (50 mL/Hr) IV Continuous <Continuous>  dextrose 50% Injectable 12.5 Gram(s) IV Push once  dextrose 50% Injectable 25 Gram(s) IV Push once  dextrose 50% Injectable 25 Gram(s) IV Push once  famotidine    Tablet 20 milliGRAM(s) Oral daily  ferrous    sulfate 325 milliGRAM(s) Oral daily  heparin  Injectable 5000 Unit(s) SubCutaneous every 12 hours  hydrALAZINE 10 milliGRAM(s) Oral two times a day  insulin lispro (HumaLOG) corrective regimen sliding scale   SubCutaneous three times a day before meals  melatonin 3 milliGRAM(s) Oral at bedtime  metoprolol tartrate 50 milliGRAM(s) Oral every 8 hours        REVIEW OF SYSTEMS:    CONSTITUTIONAL: No weakness, fevers or chills  EYES/ENT:   no throat pain   NECK: No pain or stiffness  RESPIRATORY: no cough, wheezing, hemoptysis; pos  shortness of breath  CARDIOVASCULAR: No chest pain or palpitations  GASTROINTESTINAL: No abdominal or epigastric pain. No nausea, vomiting,     No diarrhea or constipation. No melena   SKIN: dry  , DISCOLORATION OF R  FOREFOOT  ,S/P L BKA                              8.8    17.25 )-----------( 256      ( 2018 07:03 )             27.2       CBC Full  -  ( 2018 07:03 )  WBC Count : 17.25 K/uL  Hemoglobin : 8.8 g/dL  Hematocrit : 27.2 %  Platelet Count - Automated : 256 K/uL  Mean Cell Volume : 85.3 fl  Mean Cell Hemoglobin : 27.6 pg  Mean Cell Hemoglobin Concentration : 32.4 gm/dL  Auto Neutrophil # : x  Auto Lymphocyte # : x  Auto Monocyte # : x  Auto Eosinophil # : x  Auto Basophil # : x  Auto Neutrophil % : x  Auto Lymphocyte % : x  Auto Monocyte % : x  Auto Eosinophil % : x  Auto Basophil % : x      06-20    138  |  99  |  33<H>  ----------------------------<  141<H>  3.7   |  26  |  3.60<H>    Ca    8.0<L>      2018 07:03  Phos  3.1     06-20  Mg     2.1     06-20    TPro  6.3  /  Alb  2.1<L>  /  TBili  0.6  /  DBili  x   /  AST  29  /  ALT  20  /  AlkPhos  194<H>  06-20      CAPILLARY BLOOD GLUCOSE      POCT Blood Glucose.: 119 mg/dL (2018 19:04)  POCT Blood Glucose.: 109 mg/dL (2018 11:47)  POCT Blood Glucose.: 151 mg/dL (2018 06:31)  POCT Blood Glucose.: 141 mg/dL (2018 21:57)      Vital Signs Last 24 Hrs  T(C): 36.8 (2018 20:41), Max: 36.9 (2018 03:09)  T(F): 98.2 (2018 20:41), Max: 98.5 (2018 03:09)  HR: 85 (2018 21:00) (77 - 103)  BP: 117/54 (2018 21:00) (104/56 - 130/53)  BP(mean): --  RR: 16 (2018 21:00) (14 - 20)  SpO2: 96% (2018 21:00) (95% - 97%)    Urinalysis Basic - ( 2018 07:48 )    Color: Yellow / Appearance: Slightly Turbid / S.010 / pH: x  Gluc: x / Ketone: Trace  / Bili: Negative / Urobili: Negative   Blood: x / Protein: 500 mg/dL / Nitrite: Negative   Leuk Esterase: Trace / RBC: 6-10 /HPF / WBC 26-50   Sq Epi: x / Non Sq Epi: Moderate / Bacteria: Many        PT/INR - ( 2018 07:03 )   PT: 18.0 sec;   INR: 1.63 ratio                                          PHYSICAL EXAM:    Constitutional: NAD  HEENT: conjunctive   clear   Neck:  No JVD  Respiratory: CTAB  Cardiovascular: S1 and S2  Gastrointestinal: BS+, soft, NT/ND  Extremities: pos  peripheral edema  Neurological: , no focal deficits  Psychiatric: Normal mood, normal affect  : No Bauer  Skin: dry ,  DISCOLORATION OF R  FOREFOOT  ,S/P L BKALABS:

## 2018-06-21 LAB
ALBUMIN SERPL ELPH-MCNC: 1.9 G/DL — LOW (ref 3.3–5)
ALP SERPL-CCNC: 178 U/L — HIGH (ref 40–120)
ALT FLD-CCNC: 18 U/L — SIGNIFICANT CHANGE UP (ref 12–78)
ANION GAP SERPL CALC-SCNC: 18 MMOL/L — HIGH (ref 5–17)
AST SERPL-CCNC: 26 U/L — SIGNIFICANT CHANGE UP (ref 15–37)
BILIRUB SERPL-MCNC: 0.5 MG/DL — SIGNIFICANT CHANGE UP (ref 0.2–1.2)
BUN SERPL-MCNC: 48 MG/DL — HIGH (ref 7–23)
CALCIUM SERPL-MCNC: 7.7 MG/DL — LOW (ref 8.5–10.1)
CHLORIDE SERPL-SCNC: 101 MMOL/L — SIGNIFICANT CHANGE UP (ref 96–108)
CO2 SERPL-SCNC: 20 MMOL/L — LOW (ref 22–31)
CREAT SERPL-MCNC: 4.6 MG/DL — HIGH (ref 0.5–1.3)
CULTURE RESULTS: NO GROWTH — SIGNIFICANT CHANGE UP
GLUCOSE SERPL-MCNC: 133 MG/DL — HIGH (ref 70–99)
HCT VFR BLD CALC: 29.2 % — LOW (ref 39–50)
HGB BLD-MCNC: 9.2 G/DL — LOW (ref 13–17)
MCHC RBC-ENTMCNC: 26.9 PG — LOW (ref 27–34)
MCHC RBC-ENTMCNC: 31.5 GM/DL — LOW (ref 32–36)
MCV RBC AUTO: 85.4 FL — SIGNIFICANT CHANGE UP (ref 80–100)
NRBC # BLD: 0 /100 WBCS — SIGNIFICANT CHANGE UP (ref 0–0)
PLATELET # BLD AUTO: 301 K/UL — SIGNIFICANT CHANGE UP (ref 150–400)
POTASSIUM SERPL-MCNC: 3.9 MMOL/L — SIGNIFICANT CHANGE UP (ref 3.5–5.3)
POTASSIUM SERPL-SCNC: 3.9 MMOL/L — SIGNIFICANT CHANGE UP (ref 3.5–5.3)
PROT SERPL-MCNC: 6.2 G/DL — SIGNIFICANT CHANGE UP (ref 6–8.3)
RBC # BLD: 3.42 M/UL — LOW (ref 4.2–5.8)
RBC # FLD: 16.7 % — HIGH (ref 10.3–14.5)
SODIUM SERPL-SCNC: 139 MMOL/L — SIGNIFICANT CHANGE UP (ref 135–145)
SPECIMEN SOURCE: SIGNIFICANT CHANGE UP
WBC # BLD: 14.02 K/UL — HIGH (ref 3.8–10.5)
WBC # FLD AUTO: 14.02 K/UL — HIGH (ref 3.8–10.5)

## 2018-06-21 RX ADMIN — Medication 50 MILLIGRAM(S): at 14:25

## 2018-06-21 RX ADMIN — Medication 2: at 14:24

## 2018-06-21 RX ADMIN — PIPERACILLIN AND TAZOBACTAM 25 GRAM(S): 4; .5 INJECTION, POWDER, LYOPHILIZED, FOR SOLUTION INTRAVENOUS at 06:26

## 2018-06-21 RX ADMIN — Medication 50 MILLIGRAM(S): at 06:26

## 2018-06-21 RX ADMIN — Medication 1: at 17:59

## 2018-06-21 RX ADMIN — MORPHINE SULFATE 2 MILLIGRAM(S): 50 CAPSULE, EXTENDED RELEASE ORAL at 06:41

## 2018-06-21 RX ADMIN — ERYTHROPOIETIN 10000 UNIT(S): 10000 INJECTION, SOLUTION INTRAVENOUS; SUBCUTANEOUS at 13:14

## 2018-06-21 RX ADMIN — Medication 10 MILLIGRAM(S): at 06:26

## 2018-06-21 RX ADMIN — Medication 325 MILLIGRAM(S): at 14:23

## 2018-06-21 RX ADMIN — Medication 10 MILLIGRAM(S): at 17:59

## 2018-06-21 RX ADMIN — PIPERACILLIN AND TAZOBACTAM 25 GRAM(S): 4; .5 INJECTION, POWDER, LYOPHILIZED, FOR SOLUTION INTRAVENOUS at 17:59

## 2018-06-21 RX ADMIN — FAMOTIDINE 20 MILLIGRAM(S): 10 INJECTION INTRAVENOUS at 14:24

## 2018-06-21 RX ADMIN — MORPHINE SULFATE 2 MILLIGRAM(S): 50 CAPSULE, EXTENDED RELEASE ORAL at 06:26

## 2018-06-21 NOTE — PROGRESS NOTE ADULT - SUBJECTIVE AND OBJECTIVE BOX
PROGRESS NOTE  Patient is a 62y old  Male who presents with a chief complaint of sent from [facility] for increasing pain in right foot, cold, pulseless (2018 01:12)  Chart and available morning labs /imaging are reviewed electronically , urgent issues addressed . More information  is being added upon completion of rounds , when more information is collected and management discussed with consultants , medical staff and social service/case management on the floor   SEEN IN HD UNIT today   OVERNIGHT  No new issues reported by medical staff . All above noted Patient is resting in a bed comfortably .Confused  .No distress noted     HPI:  pt sent in to from [facility] hx of htn pvd dm ckd anemia resp failure cardiac arrest, tia cva on hd sent for a "blood clot in artery of r foot"  	per note from snf: r foot pain with discoloration of hallux 2/3/4 no trauma. decreased to no pulses of foot  	arterial doppler: diffuse monophasic waveforms-can be associated with stenosis of the inflow at the level of iliacs  	xray: no fx dated   pt stated sx began 2 days ago or so (2018 17:30)    PAST MEDICAL & SURGICAL HISTORY:  TIA (transient ischemic attack)  ESRD (end stage renal disease) on dialysis  ASHD (arteriosclerotic heart disease)  Cardiac arrest  CHF (congestive heart failure)  Anemia  HTN (hypertension)  Peripheral Arterial Disease  Controlled Type 2 Diabetes with Leg or Foot Ulcer  ETOH Abuse  Amputation, Below Knee, Unilateral, Traumatic      MEDICATIONS  (STANDING):  dextrose 5%. 1000 milliLiter(s) (50 mL/Hr) IV Continuous <Continuous>  dextrose 50% Injectable 12.5 Gram(s) IV Push once  dextrose 50% Injectable 25 Gram(s) IV Push once  dextrose 50% Injectable 25 Gram(s) IV Push once  diltiazem    Tablet 30 milliGRAM(s) Oral every 8 hours  epoetin flower Injectable 33499 Unit(s) IV Push <User Schedule>  famotidine    Tablet 20 milliGRAM(s) Oral daily  ferrous    sulfate 325 milliGRAM(s) Oral daily  hydrALAZINE 10 milliGRAM(s) Oral two times a day  insulin lispro (HumaLOG) corrective regimen sliding scale   SubCutaneous three times a day before meals  melatonin 3 milliGRAM(s) Oral at bedtime  metoprolol tartrate 50 milliGRAM(s) Oral every 8 hours  piperacillin/tazobactam IVPB. 3.375 Gram(s) IV Intermittent every 12 hours    MEDICATIONS  (PRN):  acetaminophen   Tablet 650 milliGRAM(s) Oral every 6 hours PRN For Temp greater than 38 C (100.4 F)  acetaminophen   Tablet. 650 milliGRAM(s) Oral every 6 hours PRN Mild Pain (1 - 3)  dextrose 40% Gel 15 Gram(s) Oral once PRN Blood Glucose LESS THAN 70 milliGRAM(s)/deciliter  glucagon  Injectable 1 milliGRAM(s) IntraMuscular once PRN Glucose LESS THAN 70 milligrams/deciliter  morphine  - Injectable 2 milliGRAM(s) IV Push every 4 hours PRN Severe Pain (7 - 10)  oxyCODONE    5 mG/acetaminophen 325 mG 2 Tablet(s) Oral every 4 hours PRN Moderate Pain (4 - 6)      OBJECTIVE    T(C): 36.4 (18 @ 10:00), Max: 36.8 (18 @ 12:43)  HR: 65 (18 @ 10:00) (65 - 103)  BP: 123/58 (18 @ 10:00) (104/56 - 130/53)  RR: 16 (18 @ 10:00) (14 - 20)  SpO2: 98% (18 @ 10:00) (95% - 98%)  Wt(kg): --  I&O's Summary    2018 07:01  -  2018 07:00  --------------------------------------------------------  IN: 250 mL / OUT: 20 mL / NET: 230 mL          REVIEW OF SYSTEMS:  CONSTITUTIONAL: No fever, weight loss, or fatigue  EYES: No eye pain, visual disturbances, or discharge  ENMT:   No sinus or throat pain  NECK: No pain or stiffness  RESPIRATORY: No cough, wheezing, chills or hemoptysis; No shortness of breath  CARDIOVASCULAR: No chest pain, palpitations, dizziness, or leg swelling  GASTROINTESTINAL: No abdominal pain. No nausea, vomiting; No diarrhea or constipation. No melena or hematochezia.  GENITOURINARY: No dysuria, frequency, hematuria, or incontinence  NEUROLOGICAL: No headaches, memory loss, loss of strength, numbness, or tremors  SKIN: No itching, burning, rashes, or lesions   MUSCULOSKELETAL: No joint pain or swelling; No muscle, back, or extremity pain    PHYSICAL EXAM:  Appearance: NAD. VS past 24 hrs -as above   HEENT:   Moist oral mucosa. Conjunctiva clear b/l.   Neck : supple  Respiratory: Lungs CTAB.  Gastrointestinal:  Soft, nontender. No rebound. No rigidity. BS present	  Cardiovascular: RRR ,S1S2 present  Neurologic: Non-focal. Moving all extremities.  Extremities: s/p R AKA  ,dressing is clean .  Skin: No rashes, No ecchymoses, No cyanosis.	  wounds ,skin lesions-See skin assesment flow sheet   LABS:                        9.2    14.02 )-----------( 301      ( 2018 07:14 )             29.2     06-    139  |  101  |  48<H>  ----------------------------<  133<H>  3.9   |  20<L>  |  4.60<H>    Ca    7.7<L>      2018 07:14  Phos  3.1     -  Mg     2.1     -    TPro  6.2  /  Alb  1.9<L>  /  TBili  0.5  /  DBili  x   /  AST  26  /  ALT  18  /  AlkPhos  178<H>  -    CAPILLARY BLOOD GLUCOSE      POCT Blood Glucose.: 151 mg/dL (2018 11:48)  POCT Blood Glucose.: 143 mg/dL (2018 07:49)  POCT Blood Glucose.: 119 mg/dL (2018 19:04)    PT/INR - ( 2018 07:03 )   PT: 18.0 sec;   INR: 1.63 ratio           Urinalysis Basic - ( 2018 07:48 )    Color: Yellow / Appearance: Slightly Turbid / S.010 / pH: x  Gluc: x / Ketone: Trace  / Bili: Negative / Urobili: Negative   Blood: x / Protein: 500 mg/dL / Nitrite: Negative   Leuk Esterase: Trace / RBC: 6-10 /HPF / WBC 26-50   Sq Epi: x / Non Sq Epi: Moderate / Bacteria: Many        Culture - Blood (collected 2018 09:16)  Source: .Blood Blood  Preliminary Report (2018 10:01):    No growth to date.    Culture - Blood (collected 2018 09:14)  Source: .Blood Blood  Preliminary Report (2018 10:01):    No growth to date.    Culture - Blood (collected 2018 22:27)  Source: .Blood Blood  Preliminary Report (2018 23:01):    No growth to date.    Culture - Blood (collected 2018 22:27)  Source: .Blood Blood  Preliminary Report (2018 23:01):    No growth to date.      RADIOLOGY & ADDITIONAL TESTS:   reviewed elctronically  ASSESSMENT/PLAN:

## 2018-06-21 NOTE — PROGRESS NOTE ADULT - SUBJECTIVE AND OBJECTIVE BOX
Chief Complaint: Right foot gangrene    Interval Events: Underwent AKA yesterday    Review of Systems:  General: No fevers, chills, weight loss or gain  Skin: No rashes, color changes  Cardiovascular: No chest pain, orthopnea  Respiratory: No shortness of breath, cough  Gastrointestinal: No nausea, abdominal pain  Genitourinary: No incontinence, pain with urination  Musculoskeletal: No pain, swelling, decreased range of motion  Neurological: No headache, weakness  Psychiatric: No depression, anxiety  Endocrine: No weight loss or gain, increased thirst  All other systems are comprehensively negative.    Physical Exam:  Vital Signs Last 24 Hrs  T(C): 37.1 (19 Jun 2018 05:32), Max: 37.2 (18 Jun 2018 16:08)  T(F): 98.8 (19 Jun 2018 05:32), Max: 98.9 (18 Jun 2018 16:08)  HR: 106 (19 Jun 2018 05:32) (84 - 115)  BP: 158/109 (19 Jun 2018 05:32) (129/86 - 158/109)  BP(mean): --  RR: 18 (19 Jun 2018 05:32) (15 - 18)  SpO2: 91% (19 Jun 2018 05:32) (91% - 97%)  General: NAD  HEENT: MMM  Neck: No JVD, no carotid bruit  Lungs: CTAB  CV: Irregular, nl S1/S2, no M/R/G  Abdomen: S/NT/ND, +BS  Extremities: Left BKA, right AKA  Neuro: AAOx3, non-focal  Skin: No rash    Labs:                        8.8    16.74 )-----------( 310      ( 19 Jun 2018 06:36 )             26.5     06-19    135  |  95<L>  |  56<H>  ----------------------------<  209<H>  4.0   |  28  |  4.90<H>    Ca    8.0<L>      19 Jun 2018 06:36  Phos  2.6     06-19  Mg     2.3     06-19    TPro  6.5  /  Alb  2.2<L>  /  TBili  0.5  /  DBili  x   /  AST  24  /  ALT  23  /  AlkPhos  222<H>  06-19        PT/INR - ( 19 Jun 2018 06:36 )   PT: 16.0 sec;   INR: 1.46 ratio         PTT - ( 18 Jun 2018 16:56 )  PTT:33.2 sec    Telemetry: AF rates 60-80

## 2018-06-21 NOTE — PROGRESS NOTE ADULT - SUBJECTIVE AND OBJECTIVE BOX
JUANJP JACQUES  62y  [id number]    VASCULAR JAYDE/ DR. TORRES    POD # 1    Vital Signs Last 24 Hrs  T(C): 36.4 (21 Jun 2018 05:26), Max: 36.8 (20 Jun 2018 12:43)  T(F): 97.5 (21 Jun 2018 05:26), Max: 98.3 (20 Jun 2018 12:43)  HR: 68 (21 Jun 2018 05:26) (68 - 103)  BP: 106/67 (21 Jun 2018 05:26) (104/56 - 130/53)  BP(mean): --  RR: 14 (21 Jun 2018 05:26) (14 - 20)  SpO2: 98% (21 Jun 2018 05:26) (95% - 98%)     PHYSICAL EXAM      RIGHT STUMP POST RIGHT AKA    FRESH POST OP DRESSING DRY AND INTACT  NO DRAINAGE BLEEDING NOTED    SOME DISCOMFORT                          9.2    14.02 )-----------( 301      ( 21 Jun 2018 07:14 )             29.2       139  |  101  |  48<H>  ----------------------------<  133<H>  3.9   |  20<L>  |  4.60<H>    Ca    7.7<L>      21 Jun 2018 07:14  Phos  3.1     06-20  Mg     2.1     06-20    TPro  6.2  /  Alb  1.9<L>  /  TBili  0.5  /  DBili  x   /  AST  26  /  ALT  18  /  AlkPhos  178<H>  06-21    ASSESSMENT AND PLAN        POD # 1  S/P RIGHT AKA     CONTINUE WITH MEDICAL MANAGEMENT  WILL CHANGE DRESSING ON SATURDAY/ SOONER IF NEEDED

## 2018-06-21 NOTE — PROGRESS NOTE ADULT - SUBJECTIVE AND OBJECTIVE BOX
Interval History:    CENTRAL LINE:   [  ] YES       [  ] NO  BARAJAS:                 [  ] YES       [  ] NO         REVIEW OF SYSTEMS:  All Systems below were reviewed and are negative [  ]  HEENT:  ID:  Pulmonary:  Cardiac:  GI:  Renal:  Musculoskeletal:  All other systems above were reviewed and are negative   [  ]      MEDICATIONS  (STANDING):  dextrose 5%. 1000 milliLiter(s) (50 mL/Hr) IV Continuous <Continuous>  dextrose 50% Injectable 12.5 Gram(s) IV Push once  dextrose 50% Injectable 25 Gram(s) IV Push once  dextrose 50% Injectable 25 Gram(s) IV Push once  diltiazem    Tablet 30 milliGRAM(s) Oral every 8 hours  epoetin flower Injectable 31881 Unit(s) IV Push <User Schedule>  famotidine    Tablet 20 milliGRAM(s) Oral daily  ferrous    sulfate 325 milliGRAM(s) Oral daily  hydrALAZINE 10 milliGRAM(s) Oral two times a day  insulin lispro (HumaLOG) corrective regimen sliding scale   SubCutaneous three times a day before meals  melatonin 3 milliGRAM(s) Oral at bedtime  metoprolol tartrate 50 milliGRAM(s) Oral every 8 hours  piperacillin/tazobactam IVPB. 3.375 Gram(s) IV Intermittent every 12 hours    MEDICATIONS  (PRN):  acetaminophen   Tablet 650 milliGRAM(s) Oral every 6 hours PRN For Temp greater than 38 C (100.4 F)  acetaminophen   Tablet. 650 milliGRAM(s) Oral every 6 hours PRN Mild Pain (1 - 3)  dextrose 40% Gel 15 Gram(s) Oral once PRN Blood Glucose LESS THAN 70 milliGRAM(s)/deciliter  glucagon  Injectable 1 milliGRAM(s) IntraMuscular once PRN Glucose LESS THAN 70 milligrams/deciliter  morphine  - Injectable 2 milliGRAM(s) IV Push every 4 hours PRN Severe Pain (7 - 10)  oxyCODONE    5 mG/acetaminophen 325 mG 2 Tablet(s) Oral every 4 hours PRN Moderate Pain (4 - 6)      Vital Signs Last 24 Hrs  T(C): 36.9 (21 Jun 2018 20:42), Max: 36.9 (21 Jun 2018 20:42)  T(F): 98.4 (21 Jun 2018 20:42), Max: 98.4 (21 Jun 2018 20:42)  HR: 89 (21 Jun 2018 20:42) (52 - 93)  BP: 100/68 (21 Jun 2018 20:42) (100/68 - 127/68)  BP(mean): --  RR: 16 (21 Jun 2018 20:42) (14 - 18)  SpO2: 92% (21 Jun 2018 20:42) (92% - 100%)    I&O's Summary    20 Jun 2018 07:01  -  21 Jun 2018 07:00  --------------------------------------------------------  IN: 250 mL / OUT: 20 mL / NET: 230 mL    21 Jun 2018 07:01  -  21 Jun 2018 21:52  --------------------------------------------------------  IN: 0 mL / OUT: 1200 mL / NET: -1200 mL        PHYSICAL EXAM:  HEENT: NC/AT; PERRLA  Neck: Soft; no tenderness  Lungs: CTA bilaterally; no wheezing.   Heart:  Abdomen:  Genital/ Rectal:  Extremities:  Neurologic:  Vascular:      LABORATORY:    CBC Full  -  ( 21 Jun 2018 07:14 )  WBC Count : 14.02 K/uL  Hemoglobin : 9.2 g/dL  Hematocrit : 29.2 %  Platelet Count - Automated : 301 K/uL  Mean Cell Volume : 85.4 fl  Mean Cell Hemoglobin : 26.9 pg  Mean Cell Hemoglobin Concentration : 31.5 gm/dL  Auto Neutrophil # : x  Auto Lymphocyte # : x  Auto Monocyte # : x  Auto Eosinophil # : x  Auto Basophil # : x  Auto Neutrophil % : x  Auto Lymphocyte % : x  Auto Monocyte % : x  Auto Eosinophil % : x  Auto Basophil % : x      ESR:                   06-18 @ 23:03  --    C-Reactive Protein:     06-18 @ 23:03  --    Procalcitonin:           06-18 @ 23:03   1.30      06-21    139  |  101  |  48<H>  ----------------------------<  133<H>  3.9   |  20<L>  |  4.60<H>    Ca    7.7<L>      21 Jun 2018 07:14  Phos  3.1     06-20  Mg     2.1     06-20    TPro  6.2  /  Alb  1.9<L>  /  TBili  0.5  /  DBili  x   /  AST  26  /  ALT  18  /  AlkPhos  178<H>  06-21          Assessment and Plan:          Leo Schulz MD   [phone]. He is afebrile  No new events.     MEDICATIONS  (STANDING):  dextrose 5%. 1000 milliLiter(s) (50 mL/Hr) IV Continuous <Continuous>  dextrose 50% Injectable 12.5 Gram(s) IV Push once  dextrose 50% Injectable 25 Gram(s) IV Push once  dextrose 50% Injectable 25 Gram(s) IV Push once  diltiazem    Tablet 30 milliGRAM(s) Oral every 8 hours  epoetin flower Injectable 16014 Unit(s) IV Push <User Schedule>  famotidine    Tablet 20 milliGRAM(s) Oral daily  ferrous    sulfate 325 milliGRAM(s) Oral daily  hydrALAZINE 10 milliGRAM(s) Oral two times a day  insulin lispro (HumaLOG) corrective regimen sliding scale   SubCutaneous three times a day before meals  melatonin 3 milliGRAM(s) Oral at bedtime  metoprolol tartrate 50 milliGRAM(s) Oral every 8 hours  piperacillin/tazobactam IVPB. 3.375 Gram(s) IV Intermittent every 12 hours    MEDICATIONS  (PRN):  acetaminophen   Tablet 650 milliGRAM(s) Oral every 6 hours PRN For Temp greater than 38 C (100.4 F)  acetaminophen   Tablet. 650 milliGRAM(s) Oral every 6 hours PRN Mild Pain (1 - 3)  dextrose 40% Gel 15 Gram(s) Oral once PRN Blood Glucose LESS THAN 70 milliGRAM(s)/deciliter  glucagon  Injectable 1 milliGRAM(s) IntraMuscular once PRN Glucose LESS THAN 70 milligrams/deciliter  morphine  - Injectable 2 milliGRAM(s) IV Push every 4 hours PRN Severe Pain (7 - 10)  oxyCODONE    5 mG/acetaminophen 325 mG 2 Tablet(s) Oral every 4 hours PRN Moderate Pain (4 - 6)      Vital Signs Last 24 Hrs  T(C): 36.9 (21 Jun 2018 20:42), Max: 36.9 (21 Jun 2018 20:42)  T(F): 98.4 (21 Jun 2018 20:42), Max: 98.4 (21 Jun 2018 20:42)  HR: 89 (21 Jun 2018 20:42) (52 - 93)  BP: 100/68 (21 Jun 2018 20:42) (100/68 - 127/68)  BP(mean): --  RR: 16 (21 Jun 2018 20:42) (14 - 18)  SpO2: 92% (21 Jun 2018 20:42) (92% - 100%)    I&O's Summary    20 Jun 2018 07:01  -  21 Jun 2018 07:00  --------------------------------------------------------  IN: 250 mL / OUT: 20 mL / NET: 230 mL    21 Jun 2018 07:01  -  21 Jun 2018 21:52  --------------------------------------------------------  IN: 0 mL / OUT: 1200 mL / NET: -1200 mL      PHYSICAL EXAM:  HEENT: NC/AT; PERRLA  Neck: Soft; no tenderness  Lungs: CTA bilaterally; no wheezing.   Heart: RRR; no murmurs.  Abdomen: Soft; no masses.  Extremities: No ulcers   Neurologic: Awake.       LABORATORY:    CBC Full  -  ( 21 Jun 2018 07:14 )  WBC Count : 14.02 K/uL  Hemoglobin : 9.2 g/dL  Hematocrit : 29.2 %  Platelet Count - Automated : 301 K/uL  Mean Cell Volume : 85.4 fl  Mean Cell Hemoglobin : 26.9 pg  Mean Cell Hemoglobin Concentration : 31.5 gm/dL  Auto Neutrophil # : x  Auto Lymphocyte # : x  Auto Monocyte # : x  Auto Eosinophil # : x  Auto Basophil # : x  Auto Neutrophil % : x  Auto Lymphocyte % : x  Auto Monocyte % : x  Auto Eosinophil % : x  Auto Basophil % : x      ESR:                   06-18 @ 23:03  --    C-Reactive Protein:     06-18 @ 23:03  --    Procalcitonin:           06-18 @ 23:03   1.30      06-21    139  |  101  |  48<H>  ----------------------------<  133<H>  3.9   |  20<L>  |  4.60<H>    Ca    7.7<L>      21 Jun 2018 07:14  Phos  3.1     06-20  Mg     2.1     06-20    TPro  6.2  /  Alb  1.9<L>  /  TBili  0.5  /  DBili  x   /  AST  26  /  ALT  18  /  AlkPhos  178<H>  06-21    Assessment and Plan:    1. R foot with gangrene, s/p R AKA.   2. Severe PAD.  3. ESRD on HD  4. UTI.    . Continue IV Zosyn.  . Follow all cultures.   . Wound care daily.       Leo Schulz MD   [phone].

## 2018-06-21 NOTE — PROGRESS NOTE ADULT - SUBJECTIVE AND OBJECTIVE BOX
VITALS/LABS  6/21/2018 afeb 68 106/67 14 98%   6/21/2018 W 14 Hb 9.2 Plt 301 Na 139 K 3.9 CO2 20 Cr 4.6     PATIENT DESCRIPTION 6/18/2018 ADMISSION [facility]  62 year old male with a history of HTN, DM, ESRD on HD, alcohol abuse, PAD s/p left BKA, cardiac arrest 11/2017, mild AS  prior left BKA amputation.. He has a prosthesis for his left leg but is essentially nonambulatory. Arterial dopplers confirm multilevel occlusive disease of his right leg patient was admitted [facility] 6/18 with pain R foot and was seen by Vasc surg who felt that pt needs RAKA scheduled for 6/20 CXR showed bl effsns poss airsapce opac Pt had leukocutyosis and a creat 4.5 Pulm consulted 6/18/2018 becaause of dyspnea    PROBLEMS ASSESSMENT PLAN 6/18/2018 ADMISSION [facility]  RESP   6/20 Resp status improved   6/18 resp distress likely sec to fluid ol Is getting HD  6/18/2018 Monitor PO Target po 90-95%  INFECTION  6/18-6/19-6/21 W 15.8-16.7 -14  6/18 pct 1.3   Vanco given 6/18  CAD  11/30/2017 ECHO ef 65%   ASA 81 (6/18)   metoporolol 50.3 (6/18)   HTN  Norvasc 5 (6/18)   Hydralazine 10.2 (6/18)   ESRD  HD ordered for 6/18 6/20  PVD PAIN FOOT  On MS Percocet  RAKA 6/20 done Dr Dang  Pt ws cleared from Pulm standpoint is in optimal shape and benefits exceed risks   MICROBIO   6/19 bc n   VANCO LEVEL MONITROING   6/19 Vanco 17   ABIO  Zosyn (6/19)   Vanco 500 one dose (6/19)   Vanco 1 g one dose  (6/18)    GLOBAL ISSUE/BEST PRACTICE:        PROBLEM: Analgesia:     na                        PROBLEM: Sedation:     na               PROBLEM: HOB elevation:   y            PROBLEM: Stress ulcer proph:    pepcid 20 (6/18)                       PROBLEM: VTE prophylaxis:      hsc (6/18)                  PROBLEM: Glycemic control:    na  PROBLEM: Nutrition:    cons carb renal chamorro dys 3 soft thin glucerna 1.2 (6/18)           PROBLEM: Advanced directive: na     PROBLEM: Allergies:  na    TIME SPENT Over 25 minutes aggregate care time spent on encounter; activities included   direct patient care, counseling and/or coordinating care reviewing notes, lab data/ imaging , discussion with multidisciplinary team/ patient  /family

## 2018-06-21 NOTE — PROGRESS NOTE ADULT - SUBJECTIVE AND OBJECTIVE BOX
Patient is a 62y Male whom presented to the hospital with esrd on hd     PAST MEDICAL & SURGICAL HISTORY:  TIA (transient ischemic attack)  ESRD (end stage renal disease) on dialysis  ASHD (arteriosclerotic heart disease)  Cardiac arrest  CHF (congestive heart failure)  Anemia  HTN (hypertension)  Peripheral Arterial Disease  Controlled Type 2 Diabetes with Leg or Foot Ulcer  ETOH Abuse  Amputation, Below Knee, Unilateral, Traumatic      MEDICATIONS  (STANDING):  aspirin  chewable 81 milliGRAM(s) Oral daily  dextrose 5%. 1000 milliLiter(s) (50 mL/Hr) IV Continuous <Continuous>  dextrose 50% Injectable 12.5 Gram(s) IV Push once  dextrose 50% Injectable 25 Gram(s) IV Push once  dextrose 50% Injectable 25 Gram(s) IV Push once  famotidine    Tablet 20 milliGRAM(s) Oral daily  ferrous    sulfate 325 milliGRAM(s) Oral daily  heparin  Injectable 5000 Unit(s) SubCutaneous every 12 hours  hydrALAZINE 10 milliGRAM(s) Oral two times a day  insulin lispro (HumaLOG) corrective regimen sliding scale   SubCutaneous three times a day before meals  melatonin 3 milliGRAM(s) Oral at bedtime  metoprolol tartrate 50 milliGRAM(s) Oral every 8 hours        REVIEW OF SYSTEMS:    CONSTITUTIONAL: No weakness, fevers or chills  EYES/ENT:   no throat pain   NECK: No pain or stiffness  RESPIRATORY: no cough, wheezing, hemoptysis; pos  shortness of breath  CARDIOVASCULAR: No chest pain or palpitations  GASTROINTESTINAL: No abdominal or epigastric pain. No nausea, vomiting,     No diarrhea or constipation. No melena   SKIN: dry  , DISCOLORATION OF R  FOREFOOT  ,S/P L BKA                                                   9.2    14.02 )-----------( 301      ( 2018 07:14 )             29.2       CBC Full  -  ( 2018 07:14 )  WBC Count : 14.02 K/uL  Hemoglobin : 9.2 g/dL  Hematocrit : 29.2 %  Platelet Count - Automated : 301 K/uL  Mean Cell Volume : 85.4 fl  Mean Cell Hemoglobin : 26.9 pg  Mean Cell Hemoglobin Concentration : 31.5 gm/dL  Auto Neutrophil # : x  Auto Lymphocyte # : x  Auto Monocyte # : x  Auto Eosinophil # : x  Auto Basophil # : x  Auto Neutrophil % : x  Auto Lymphocyte % : x  Auto Monocyte % : x  Auto Eosinophil % : x  Auto Basophil % : x      06-21    139  |  101  |  48<H>  ----------------------------<  133<H>  3.9   |  20<L>  |  4.60<H>    Ca    7.7<L>      2018 07:14  Phos  3.1     06-20  Mg     2.1     06-20    TPro  6.2  /  Alb  1.9<L>  /  TBili  0.5  /  DBili  x   /  AST  26  /  ALT  18  /  AlkPhos  178<H>  -21      CAPILLARY BLOOD GLUCOSE      POCT Blood Glucose.: 151 mg/dL (2018 11:48)  POCT Blood Glucose.: 143 mg/dL (2018 07:49)  POCT Blood Glucose.: 119 mg/dL (2018 19:04)      Vital Signs Last 24 Hrs  T(C): 36.4 (2018 10:00), Max: 36.8 (2018 20:41)  T(F): 97.5 (2018 10:00), Max: 98.2 (2018 20:41)  HR: 65 (2018 10:00) (65 - 88)  BP: 123/58 (2018 10:00) (104/56 - 130/53)  BP(mean): --  RR: 16 (2018 10:00) (14 - 20)  SpO2: 98% (2018 10:00) (95% - 98%)    Urinalysis Basic - ( 2018 07:48 )    Color: Yellow / Appearance: Slightly Turbid / S.010 / pH: x  Gluc: x / Ketone: Trace  / Bili: Negative / Urobili: Negative   Blood: x / Protein: 500 mg/dL / Nitrite: Negative   Leuk Esterase: Trace / RBC: 6-10 /HPF / WBC 26-50   Sq Epi: x / Non Sq Epi: Moderate / Bacteria: Many        PT/INR - ( 2018 07:03 )   PT: 18.0 sec;   INR: 1.63 ratio                                          PHYSICAL EXAM:    Constitutional: NAD  HEENT: conjunctive   clear   Neck:  No JVD  Respiratory: CTAB  Cardiovascular: S1 and S2  Gastrointestinal: BS+, soft, NT/ND  Extremities: pos  peripheral edema  Neurological: , no focal deficits  Psychiatric: Normal mood, normal affect  : No Bauer  Skin: dry ,  DISCOLORATION OF R  FOREFOOT  ,S/P L BKALABS:

## 2018-06-22 LAB
ALBUMIN SERPL ELPH-MCNC: 1.9 G/DL — LOW (ref 3.3–5)
ALP SERPL-CCNC: 222 U/L — HIGH (ref 40–120)
ALT FLD-CCNC: 18 U/L — SIGNIFICANT CHANGE UP (ref 12–78)
ANION GAP SERPL CALC-SCNC: 11 MMOL/L — SIGNIFICANT CHANGE UP (ref 5–17)
AST SERPL-CCNC: 19 U/L — SIGNIFICANT CHANGE UP (ref 15–37)
BILIRUB SERPL-MCNC: 0.4 MG/DL — SIGNIFICANT CHANGE UP (ref 0.2–1.2)
BUN SERPL-MCNC: 30 MG/DL — HIGH (ref 7–23)
CALCIUM SERPL-MCNC: 7.9 MG/DL — LOW (ref 8.5–10.1)
CHLORIDE SERPL-SCNC: 101 MMOL/L — SIGNIFICANT CHANGE UP (ref 96–108)
CO2 SERPL-SCNC: 25 MMOL/L — SIGNIFICANT CHANGE UP (ref 22–31)
CREAT SERPL-MCNC: 3.4 MG/DL — HIGH (ref 0.5–1.3)
GLUCOSE SERPL-MCNC: 230 MG/DL — HIGH (ref 70–99)
HCT VFR BLD CALC: 31 % — LOW (ref 39–50)
HGB BLD-MCNC: 10.2 G/DL — LOW (ref 13–17)
MAGNESIUM SERPL-MCNC: 2.2 MG/DL — SIGNIFICANT CHANGE UP (ref 1.6–2.6)
MCHC RBC-ENTMCNC: 27.6 PG — SIGNIFICANT CHANGE UP (ref 27–34)
MCHC RBC-ENTMCNC: 32.9 GM/DL — SIGNIFICANT CHANGE UP (ref 32–36)
MCV RBC AUTO: 83.8 FL — SIGNIFICANT CHANGE UP (ref 80–100)
NRBC # BLD: 0 /100 WBCS — SIGNIFICANT CHANGE UP (ref 0–0)
PHOSPHATE SERPL-MCNC: 3.5 MG/DL — SIGNIFICANT CHANGE UP (ref 2.5–4.5)
PLATELET # BLD AUTO: 295 K/UL — SIGNIFICANT CHANGE UP (ref 150–400)
POTASSIUM SERPL-MCNC: 3.7 MMOL/L — SIGNIFICANT CHANGE UP (ref 3.5–5.3)
POTASSIUM SERPL-SCNC: 3.7 MMOL/L — SIGNIFICANT CHANGE UP (ref 3.5–5.3)
PROT SERPL-MCNC: 6.2 G/DL — SIGNIFICANT CHANGE UP (ref 6–8.3)
RBC # BLD: 3.7 M/UL — LOW (ref 4.2–5.8)
RBC # FLD: 16.9 % — HIGH (ref 10.3–14.5)
SODIUM SERPL-SCNC: 137 MMOL/L — SIGNIFICANT CHANGE UP (ref 135–145)
WBC # BLD: 13.25 K/UL — HIGH (ref 3.8–10.5)
WBC # FLD AUTO: 13.25 K/UL — HIGH (ref 3.8–10.5)

## 2018-06-22 RX ORDER — CEFDINIR 250 MG/5ML
300 POWDER, FOR SUSPENSION ORAL DAILY
Qty: 0 | Refills: 0 | Status: COMPLETED | OUTPATIENT
Start: 2018-06-23 | End: 2018-06-25

## 2018-06-22 RX ORDER — CEFDINIR 250 MG/5ML
300 POWDER, FOR SUSPENSION ORAL
Qty: 0 | Refills: 0 | Status: DISCONTINUED | OUTPATIENT
Start: 2018-06-22 | End: 2018-06-22

## 2018-06-22 RX ORDER — METOPROLOL TARTRATE 50 MG
50 TABLET ORAL ONCE
Qty: 0 | Refills: 0 | Status: COMPLETED | OUTPATIENT
Start: 2018-06-22 | End: 2018-06-22

## 2018-06-22 RX ADMIN — OXYCODONE AND ACETAMINOPHEN 2 TABLET(S): 5; 325 TABLET ORAL at 17:42

## 2018-06-22 RX ADMIN — PIPERACILLIN AND TAZOBACTAM 25 GRAM(S): 4; .5 INJECTION, POWDER, LYOPHILIZED, FOR SOLUTION INTRAVENOUS at 17:21

## 2018-06-22 RX ADMIN — FAMOTIDINE 20 MILLIGRAM(S): 10 INJECTION INTRAVENOUS at 11:35

## 2018-06-22 RX ADMIN — Medication 3 MILLIGRAM(S): at 21:33

## 2018-06-22 RX ADMIN — Medication 4: at 11:47

## 2018-06-22 RX ADMIN — PIPERACILLIN AND TAZOBACTAM 25 GRAM(S): 4; .5 INJECTION, POWDER, LYOPHILIZED, FOR SOLUTION INTRAVENOUS at 05:13

## 2018-06-22 RX ADMIN — Medication 50 MILLIGRAM(S): at 11:35

## 2018-06-22 RX ADMIN — Medication 50 MILLIGRAM(S): at 03:05

## 2018-06-22 RX ADMIN — Medication 325 MILLIGRAM(S): at 11:35

## 2018-06-22 RX ADMIN — Medication 10 MILLIGRAM(S): at 05:13

## 2018-06-22 RX ADMIN — Medication 50 MILLIGRAM(S): at 18:01

## 2018-06-22 RX ADMIN — Medication 50 MILLIGRAM(S): at 21:33

## 2018-06-22 RX ADMIN — Medication 5: at 17:22

## 2018-06-22 RX ADMIN — Medication 10 MILLIGRAM(S): at 17:21

## 2018-06-22 NOTE — CHART NOTE - NSCHARTNOTEFT_GEN_A_CORE
Called by RN for Pt in Afib 130s on tele monitor. Pt seen and examined at beside, pt has no acute complaints, denies cp, sob, palpitations or dizziness. tele monitor reviewed, pt in nonsustained afib 109-130s, EKG performed at bedside showing afib . As per RN pt pt refused to take his bedtime dose of Lopressor and Cardizem.     Vital Signs Last 24 Hrs  T(C): 36.9 (21 Jun 2018 20:42), Max: 36.9 (21 Jun 2018 20:42)  T(F): 98.4 (21 Jun 2018 20:42), Max: 98.4 (21 Jun 2018 20:42)  HR: 106 (22 Jun 2018 02:53) (52 - 106)  BP: 125/85 (22 Jun 2018 02:53) (100/68 - 127/68)  BP(mean): --  RR: 18 (22 Jun 2018 02:53) (14 - 18)  SpO2: 92% (22 Jun 2018 02:53) (92% - 100%)    Physical :  Gen- NAD, ncat  Cardio - s+1,s+2, rrr, no murmur  Lung - cta b/l, no wheeze, no rhonchi, no rales   Abdomen- +BS, NT/ND, no guarding, no rebound, no masses  Ext- s/p right AKA, dressing intact.   Neuro- CN grossly intact      Assessment/Plan  62yMale admitted for ischemic foot s/p AKA now in afib RVR  1. Given dose of Lopressor and Cardizem PO, ordered Mg/phos in the am, will continue to follow, Rn to call if change in status.   2. Coumadin on hold for pt s/p right AKA

## 2018-06-22 NOTE — PROGRESS NOTE ADULT - SUBJECTIVE AND OBJECTIVE BOX
JP SANDOVAL  62y  MRN-[id number]    [facility]    POD # 2    Vital Signs Last 24 Hrs  T(C): 36.4 (22 Jun 2018 05:32), Max: 36.4 (22 Jun 2018 05:32)  T(F): 97.5 (22 Jun 2018 05:32), Max: 97.5 (22 Jun 2018 05:32)  HR: 76 (22 Jun 2018 17:17) (72 - 106)  BP: 125/74 (22 Jun 2018 17:17) (125/74 - 136/86)  BP(mean): --  RR: 18 (22 Jun 2018 05:32) (18 - 18)  SpO2: 99% (22 Jun 2018 05:32) (92% - 99%)       PHYSICAL EXAM    RIGHT AKA STUMP    DRESSING REMAINS DRY AND INTACT  NO DRAINAGE, BLEEDING NOTED       CBC Full  -  ( 22 Jun 2018 06:58 )  WBC Count : 13.25 K/uL  Hemoglobin : 10.2 g/dL  Hematocrit : 31.0 %  Platelet Count - Automated : 295 K/uL  Mean Cell Volume : 83.8 fl  Mean Cell Hemoglobin : 27.6 pg  Mean Cell Hemoglobin Concentration : 32.9 gm/dL  Auto Neutrophil # : x  Auto Lymphocyte # : x  Auto Monocyte # : x  Auto Eosinophil # : x  Auto Basophil # : x  Auto Neutrophil % : x  Auto Lymphocyte % : x  Auto Monocyte % : x  Auto Eosinophil % : x  Auto Basophil % : x    06-22    137  |  101  |  30<H>  ----------------------------<  230<H>  3.7   |  25  |  3.40<H>    Ca    7.9<L>      22 Jun 2018 06:58  Phos  3.5     06-22  Mg     2.2     06-22    TPro  6.2  /  Alb  1.9<L>  /  TBili  0.4  /  DBili  x   /  AST  19  /  ALT  18  /  AlkPhos  222<H>  06-22    ASSESSMENT AND PLAN        POD # 2  S/P RIGHT AKA     WILLTAKE DOWN THE ORIGINAL DRESSING IN AM AND EVALUATE THE STUMP           DISCHARGE HOME TODAY AND FOLLOW UP WITH DR. TORRES AS OUT PATIENT

## 2018-06-22 NOTE — PROGRESS NOTE ADULT - SUBJECTIVE AND OBJECTIVE BOX
Interval History:    CENTRAL LINE:   [  ] YES       [  ] NO  BARAJAS:                 [  ] YES       [  ] NO         REVIEW OF SYSTEMS:  All Systems below were reviewed and are negative [  ]  HEENT:  ID:  Pulmonary:  Cardiac:  GI:  Renal:  Musculoskeletal:  All other systems above were reviewed and are negative   [  ]      MEDICATIONS  (STANDING):  dextrose 5%. 1000 milliLiter(s) (50 mL/Hr) IV Continuous <Continuous>  dextrose 50% Injectable 12.5 Gram(s) IV Push once  dextrose 50% Injectable 25 Gram(s) IV Push once  dextrose 50% Injectable 25 Gram(s) IV Push once  diltiazem    Tablet 30 milliGRAM(s) Oral every 8 hours  epoetin flower Injectable 37046 Unit(s) IV Push <User Schedule>  famotidine    Tablet 20 milliGRAM(s) Oral daily  ferrous    sulfate 325 milliGRAM(s) Oral daily  hydrALAZINE 10 milliGRAM(s) Oral two times a day  insulin lispro (HumaLOG) corrective regimen sliding scale   SubCutaneous three times a day before meals  melatonin 3 milliGRAM(s) Oral at bedtime  metoprolol tartrate 50 milliGRAM(s) Oral every 8 hours  piperacillin/tazobactam IVPB. 3.375 Gram(s) IV Intermittent every 12 hours    MEDICATIONS  (PRN):  acetaminophen   Tablet 650 milliGRAM(s) Oral every 6 hours PRN For Temp greater than 38 C (100.4 F)  acetaminophen   Tablet. 650 milliGRAM(s) Oral every 6 hours PRN Mild Pain (1 - 3)  dextrose 40% Gel 15 Gram(s) Oral once PRN Blood Glucose LESS THAN 70 milliGRAM(s)/deciliter  glucagon  Injectable 1 milliGRAM(s) IntraMuscular once PRN Glucose LESS THAN 70 milligrams/deciliter  morphine  - Injectable 2 milliGRAM(s) IV Push every 4 hours PRN Severe Pain (7 - 10)  oxyCODONE    5 mG/acetaminophen 325 mG 2 Tablet(s) Oral every 4 hours PRN Moderate Pain (4 - 6)      Vital Signs Last 24 Hrs  T(C): 36.4 (22 Jun 2018 05:32), Max: 36.4 (22 Jun 2018 05:32)  T(F): 97.5 (22 Jun 2018 05:32), Max: 97.5 (22 Jun 2018 05:32)  HR: 76 (22 Jun 2018 17:17) (72 - 106)  BP: 125/74 (22 Jun 2018 17:17) (125/74 - 136/86)  BP(mean): --  RR: 18 (22 Jun 2018 05:32) (18 - 18)  SpO2: 99% (22 Jun 2018 05:32) (92% - 99%)    I&O's Summary    21 Jun 2018 07:01  -  22 Jun 2018 07:00  --------------------------------------------------------  IN: 0 mL / OUT: 1400 mL / NET: -1400 mL        PHYSICAL EXAM:  HEENT: NC/AT; PERRLA  Neck: Soft; no tenderness  Lungs: CTA bilaterally; no wheezing.   Heart:  Abdomen:  Genital/ Rectal:  Extremities:  Neurologic:  Vascular:      LABORATORY:    CBC Full  -  ( 22 Jun 2018 06:58 )  WBC Count : 13.25 K/uL  Hemoglobin : 10.2 g/dL  Hematocrit : 31.0 %  Platelet Count - Automated : 295 K/uL  Mean Cell Volume : 83.8 fl  Mean Cell Hemoglobin : 27.6 pg  Mean Cell Hemoglobin Concentration : 32.9 gm/dL  Auto Neutrophil # : x  Auto Lymphocyte # : x  Auto Monocyte # : x  Auto Eosinophil # : x  Auto Basophil # : x  Auto Neutrophil % : x  Auto Lymphocyte % : x  Auto Monocyte % : x  Auto Eosinophil % : x  Auto Basophil % : x      ESR:                   06-18 @ 23:03  --    C-Reactive Protein:     06-18 @ 23:03  --    Procalcitonin:           06-18 @ 23:03   1.30      06-22    137  |  101  |  30<H>  ----------------------------<  230<H>  3.7   |  25  |  3.40<H>    Ca    7.9<L>      22 Jun 2018 06:58  Phos  3.5     06-22  Mg     2.2     06-22    TPro  6.2  /  Alb  1.9<L>  /  TBili  0.4  /  DBili  x   /  AST  19  /  ALT  18  /  AlkPhos  222<H>  06-22          Assessment and Plan:          Leo Schulz MD   [phone]. He is comfortable  No fevers noted.     MEDICATIONS  (STANDING):  dextrose 5%. 1000 milliLiter(s) (50 mL/Hr) IV Continuous <Continuous>  dextrose 50% Injectable 12.5 Gram(s) IV Push once  dextrose 50% Injectable 25 Gram(s) IV Push once  dextrose 50% Injectable 25 Gram(s) IV Push once  diltiazem    Tablet 30 milliGRAM(s) Oral every 8 hours  epoetin flower Injectable 86791 Unit(s) IV Push <User Schedule>  famotidine    Tablet 20 milliGRAM(s) Oral daily  ferrous    sulfate 325 milliGRAM(s) Oral daily  hydrALAZINE 10 milliGRAM(s) Oral two times a day  insulin lispro (HumaLOG) corrective regimen sliding scale   SubCutaneous three times a day before meals  melatonin 3 milliGRAM(s) Oral at bedtime  metoprolol tartrate 50 milliGRAM(s) Oral every 8 hours  piperacillin/tazobactam IVPB. 3.375 Gram(s) IV Intermittent every 12 hours    MEDICATIONS  (PRN):  acetaminophen   Tablet 650 milliGRAM(s) Oral every 6 hours PRN For Temp greater than 38 C (100.4 F)  acetaminophen   Tablet. 650 milliGRAM(s) Oral every 6 hours PRN Mild Pain (1 - 3)  dextrose 40% Gel 15 Gram(s) Oral once PRN Blood Glucose LESS THAN 70 milliGRAM(s)/deciliter  glucagon  Injectable 1 milliGRAM(s) IntraMuscular once PRN Glucose LESS THAN 70 milligrams/deciliter  morphine  - Injectable 2 milliGRAM(s) IV Push every 4 hours PRN Severe Pain (7 - 10)  oxyCODONE    5 mG/acetaminophen 325 mG 2 Tablet(s) Oral every 4 hours PRN Moderate Pain (4 - 6)      Vital Signs Last 24 Hrs  T(C): 36.4 (22 Jun 2018 05:32), Max: 36.4 (22 Jun 2018 05:32)  T(F): 97.5 (22 Jun 2018 05:32), Max: 97.5 (22 Jun 2018 05:32)  HR: 76 (22 Jun 2018 17:17) (72 - 106)  BP: 125/74 (22 Jun 2018 17:17) (125/74 - 136/86)  BP(mean): --  RR: 18 (22 Jun 2018 05:32) (18 - 18)  SpO2: 99% (22 Jun 2018 05:32) (92% - 99%)    I&O's Summary    21 Jun 2018 07:01  -  22 Jun 2018 07:00  --------------------------------------------------------  IN: 0 mL / OUT: 1400 mL / NET: -1400 mL      PHYSICAL EXAM:  HEENT: NC/AT; PERRLA  Neck: Soft; no tenderness  Lungs: Coarse BS bilaterally; no wheezing.   Heart: RRR; no murmurs.   Abdomen: Soft; no masses.  Extremities: No ulcers.    LABORATORY:    CBC Full  -  ( 22 Jun 2018 06:58 )  WBC Count : 13.25 K/uL  Hemoglobin : 10.2 g/dL  Hematocrit : 31.0 %  Platelet Count - Automated : 295 K/uL  Mean Cell Volume : 83.8 fl  Mean Cell Hemoglobin : 27.6 pg  Mean Cell Hemoglobin Concentration : 32.9 gm/dL  Auto Neutrophil # : x  Auto Lymphocyte # : x  Auto Monocyte # : x  Auto Eosinophil # : x  Auto Basophil # : x  Auto Neutrophil % : x  Auto Lymphocyte % : x  Auto Monocyte % : x  Auto Eosinophil % : x  Auto Basophil % : x      ESR:                   06-18 @ 23:03  --    C-Reactive Protein:     06-18 @ 23:03  --    Procalcitonin:           06-18 @ 23:03   1.30      06-22    137  |  101  |  30<H>  ----------------------------<  230<H>  3.7   |  25  |  3.40<H>    Ca    7.9<L>      22 Jun 2018 06:58  Phos  3.5     06-22  Mg     2.2     06-22    TPro  6.2  /  Alb  1.9<L>  /  TBili  0.4  /  DBili  x   /  AST  19  /  ALT  18  /  AlkPhos  222<H>  06-22      Assessment and Plan:    1. R foot with gangrene, s/p R AKA.   2. Severe PAD.  3. ESRD on HD  4. UTI.    . All cultures with no growth. Discontinue IV Zosyn. Add Cefdinir 300 mg po daily for 3 days.  . Wound care daily. Discharge planning.       Leo Schulz MD   [phone].

## 2018-06-22 NOTE — PROGRESS NOTE ADULT - SUBJECTIVE AND OBJECTIVE BOX
Chief Complaint: Right foot gangrene    Interval Events: Rapid AF overnight after refusing meds.    Review of Systems:  General: No fevers, chills, weight loss or gain  Skin: No rashes, color changes  Cardiovascular: No chest pain, orthopnea  Respiratory: No shortness of breath, cough  Gastrointestinal: No nausea, abdominal pain  Genitourinary: No incontinence, pain with urination  Musculoskeletal: No pain, swelling, decreased range of motion  Neurological: No headache, weakness  Psychiatric: No depression, anxiety  Endocrine: No weight loss or gain, increased thirst  All other systems are comprehensively negative.    Physical Exam:  Vital Signs Last 24 Hrs  T(C): 36.4 (22 Jun 2018 05:32), Max: 36.9 (21 Jun 2018 20:42)  T(F): 97.5 (22 Jun 2018 05:32), Max: 98.4 (21 Jun 2018 20:42)  HR: 72 (22 Jun 2018 05:32) (52 - 106)  BP: 136/86 (22 Jun 2018 05:32) (100/68 - 136/86)  BP(mean): --  RR: 18 (22 Jun 2018 05:32) (16 - 18)  SpO2: 99% (22 Jun 2018 05:32) (92% - 100%)  General: NAD  HEENT: MMM  Neck: No JVD, no carotid bruit  Lungs: CTAB  CV: Irregular, nl S1/S2, no M/R/G  Abdomen: S/NT/ND, +BS  Extremities: Left BKA, right AKA  Neuro: AAOx3, non-focal  Skin: No rash    Labs:               06-22    137  |  101  |  30<H>  ----------------------------<  230<H>  3.7   |  25  |  3.40<H>    Ca    7.9<L>      22 Jun 2018 06:58  Phos  3.5     06-22  Mg     2.2     06-22    TPro  6.2  /  Alb  1.9<L>  /  TBili  0.4  /  DBili  x   /  AST  19  /  ALT  18  /  AlkPhos  222<H>  06-22                        10.2   13.25 )-----------( 295      ( 22 Jun 2018 06:58 )             31.0     Telemetry: AF

## 2018-06-22 NOTE — PROGRESS NOTE ADULT - SUBJECTIVE AND OBJECTIVE BOX
Patient is a 62y Male whom presented to the hospital with esrd on hd     PAST MEDICAL & SURGICAL HISTORY:  TIA (transient ischemic attack)  ESRD (end stage renal disease) on dialysis  ASHD (arteriosclerotic heart disease)  Cardiac arrest  CHF (congestive heart failure)  Anemia  HTN (hypertension)  Peripheral Arterial Disease  Controlled Type 2 Diabetes with Leg or Foot Ulcer  ETOH Abuse  Amputation, Below Knee, Unilateral, Traumatic      MEDICATIONS  (STANDING):  aspirin  chewable 81 milliGRAM(s) Oral daily  dextrose 5%. 1000 milliLiter(s) (50 mL/Hr) IV Continuous <Continuous>  dextrose 50% Injectable 12.5 Gram(s) IV Push once  dextrose 50% Injectable 25 Gram(s) IV Push once  dextrose 50% Injectable 25 Gram(s) IV Push once  famotidine    Tablet 20 milliGRAM(s) Oral daily  ferrous    sulfate 325 milliGRAM(s) Oral daily  heparin  Injectable 5000 Unit(s) SubCutaneous every 12 hours  hydrALAZINE 10 milliGRAM(s) Oral two times a day  insulin lispro (HumaLOG) corrective regimen sliding scale   SubCutaneous three times a day before meals  melatonin 3 milliGRAM(s) Oral at bedtime  metoprolol tartrate 50 milliGRAM(s) Oral every 8 hours        REVIEW OF SYSTEMS:    CONSTITUTIONAL: No weakness, fevers or chills  EYES/ENT:   no throat pain   NECK: No pain or stiffness  RESPIRATORY: no cough, wheezing, hemoptysis; pos  shortness of breath  CARDIOVASCULAR: No chest pain or palpitations  GASTROINTESTINAL: No abdominal or epigastric pain. No nausea, vomiting,     No diarrhea or constipation. No melena   SKIN: dry  , DISCOLORATION OF R  FOREFOOT  ,S/P L BKA                                                                         10.2   13.25 )-----------( 295      ( 22 Jun 2018 06:58 )             31.0       CBC Full  -  ( 22 Jun 2018 06:58 )  WBC Count : 13.25 K/uL  Hemoglobin : 10.2 g/dL  Hematocrit : 31.0 %  Platelet Count - Automated : 295 K/uL  Mean Cell Volume : 83.8 fl  Mean Cell Hemoglobin : 27.6 pg  Mean Cell Hemoglobin Concentration : 32.9 gm/dL  Auto Neutrophil # : x  Auto Lymphocyte # : x  Auto Monocyte # : x  Auto Eosinophil # : x  Auto Basophil # : x  Auto Neutrophil % : x  Auto Lymphocyte % : x  Auto Monocyte % : x  Auto Eosinophil % : x  Auto Basophil % : x      06-22    137  |  101  |  30<H>  ----------------------------<  230<H>  3.7   |  25  |  3.40<H>    Ca    7.9<L>      22 Jun 2018 06:58  Phos  3.5     06-22  Mg     2.2     06-22    TPro  6.2  /  Alb  1.9<L>  /  TBili  0.4  /  DBili  x   /  AST  19  /  ALT  18  /  AlkPhos  222<H>  06-22      CAPILLARY BLOOD GLUCOSE      POCT Blood Glucose.: 324 mg/dL (22 Jun 2018 11:41)  POCT Blood Glucose.: 218 mg/dL (22 Jun 2018 08:28)  POCT Blood Glucose.: 193 mg/dL (21 Jun 2018 17:01)      Vital Signs Last 24 Hrs  T(C): 36.4 (22 Jun 2018 05:32), Max: 36.9 (21 Jun 2018 20:42)  T(F): 97.5 (22 Jun 2018 05:32), Max: 98.4 (21 Jun 2018 20:42)  HR: 72 (22 Jun 2018 05:32) (52 - 106)  BP: 136/86 (22 Jun 2018 05:32) (100/68 - 136/86)  BP(mean): --  RR: 18 (22 Jun 2018 05:32) (16 - 18)  SpO2: 99% (22 Jun 2018 05:32) (92% - 99%)                                         PHYSICAL EXAM:    Constitutional: NAD  HEENT: conjunctive   clear   Neck:  No JVD  Respiratory: CTAB  Cardiovascular: S1 and S2  Gastrointestinal: BS+, soft, NT/ND  Extremities: pos  peripheral edema  Neurological: , no focal deficits  Psychiatric: Normal mood, normal affect  : No Bauer  Skin: dry ,  DISCOLORATION OF R  FOREFOOT  ,S/P L BKALABS:

## 2018-06-22 NOTE — PROGRESS NOTE ADULT - SUBJECTIVE AND OBJECTIVE BOX
PATIENT DESCRIPTION 6/18/2018 ADMISSION [facility]  62 year old male with a history of HTN, DM, ESRD on HD, alcohol abuse, PAD s/p left BKA, cardiac arrest 11/2017, mild AS  prior left BKA amputation.. He has a prosthesis for his left leg but is essentially nonambulatory. Arterial dopplers confirm multilevel occlusive disease of his right leg patient was admitted [facility] 6/18 with pain R foot and was seen by Vasc surg who felt that pt needs RAKA scheduled for 6/20 CXR showed bl effsns poss airsapce opac Pt had leukocutyosis and a creat 4.5 Pulm consulted 6/18/2018 becaause of dyspnea    VITALS/LABS  6/22/2018 afeb 72 130/80 18 99% 53  6/22/2018 W 13.2 Hb 10.2 Plt 295 Na 137 K 3.7 CO2 25 Cr 3.4     PROBLEMS ASSESSMENT PLAN 6/18/2018 [facility]ION [facility]  RESP   6/20 Resp status improved   6/18 resp distress likely sec to fluid ol Is getting HD  6/18/2018 Monitor PO Target po 90-95%  INFECTION  6/18-6/19-6/21 W 15.8-16.7 -14  6/18 pct 1.3   Vanco given 6/18  CAD  11/30/2017 ECHO ef 65%   ASA 81 (6/18)   metoporolol 50.3 (6/18)   HTN  Norvasc 5 (6/18)   Hydralazine 10.2 (6/18)   ESRD  HD ordered for 6/18 6/20 6/22  PVD PAIN FOOT  On MS Percocet  RAKA 6/20 done Dr Dang  Pt ws cleared from Pulm standpoint is in optimal shape and benefits exceed risks   MICROBIO   6/19 bc n   VANCO LEVEL MONITROING   6/19 Vanco 17   ABIO  Zosyn (6/19)   Vanco 500 one dose (6/19)   Vanco 1 g one dose  (6/18)    GLOBAL ISSUE/BEST PRACTICE:        PROBLEM: Analgesia:     na                        PROBLEM: Sedation:     na               PROBLEM: HOB elevation:   y            PROBLEM: Stress ulcer proph:    pepcid 20 (6/18)                       PROBLEM: VTE prophylaxis:      hsc (6/18)                  PROBLEM: Glycemic control:    na  PROBLEM: Nutrition:    cons carb renal chamorro dys 3 soft thin glucerna 1.2 (6/18)           PROBLEM: Advanced directive: na     PROBLEM: Allergies:  na    TIME SPENT Over 25 minutes aggregate care time spent on encounter; activities included   direct patient care, counseling and/or coordinating care reviewing notes, lab data/ imaging , discussion with multidisciplinary team/ patient  /family

## 2018-06-22 NOTE — PROGRESS NOTE ADULT - SUBJECTIVE AND OBJECTIVE BOX
LATE ENTRY- patient was seen and examined earlier today . Plan of care was discussed with med staff and unit coordinator .   Patient is a 62y Male whom presented to the hospital with foot infection   Chart and available morning labs /imaging are reviewed electronically , urgent issues addressed . More information  is being added upon completion of rounds , when more information is collected and management discussed with consultants , medical staff and social service/case management on the floor   PAST MEDICAL & SURGICAL HISTORY:  TIA (transient ischemic attack)  ESRD (end stage renal disease) on dialysis  ASHD (arteriosclerotic heart disease)  Cardiac arrest  CHF (congestive heart failure)  Anemia  HTN (hypertension)  Peripheral Arterial Disease  Controlled Type 2 Diabetes with Leg or Foot Ulcer  ETOH Abuse  Amputation, Below Knee, Unilateral, Traumatic  MEDICATIONS  (STANDING):  aspirin  chewable 81 milliGRAM(s) Oral daily  dextrose 5%. 1000 milliLiter(s) (50 mL/Hr) IV Continuous <Continuous>  dextrose 50% Injectable 12.5 Gram(s) IV Push once  dextrose 50% Injectable 25 Gram(s) IV Push once  dextrose 50% Injectable 25 Gram(s) IV Push once  famotidine    Tablet 20 milliGRAM(s) Oral daily  ferrous    sulfate 325 milliGRAM(s) Oral daily  heparin  Injectable 5000 Unit(s) SubCutaneous every 12 hours  hydrALAZINE 10 milliGRAM(s) Oral two times a day  insulin lispro (HumaLOG) corrective regimen sliding scale   SubCutaneous three times a day before meals  melatonin 3 milliGRAM(s) Oral at bedtime  metoprolol tartrate 50 milliGRAM(s) Oral every 8 hours  REVIEW OF SYSTEMS:  ROS is unobtainable due to lethargy and confusion   CONSTITUTIONAL: No weakness, fevers or chills  EYES/ENT:   no throat pain   NECK: No pain or stiffness  RESPIRATORY: no cough, wheezing, hemoptysis; pos  shortness of breath  CARDIOVASCULAR: No chest pain or palpitations  GASTROINTESTINAL: No abdominal or epigastric pain. No nausea, vomiting,     No diarrhea or constipation. No melena   SKIN: dry  , S/P R AKA  ,S/P L BKA                    10.2   13.25 )-----------( 295      ( 22 Jun 2018 06:58 )             31.0   CBC Full  -  ( 22 Jun 2018 06:58 )  WBC Count : 13.25 K/uL  Hemoglobin : 10.2 g/dL  Hematocrit : 31.0 %  Platelet Count - Automated : 295 K/uL  Mean Cell Volume : 83.8 fl  Mean Cell Hemoglobin : 27.6 pg  Mean Cell Hemoglobin Concentration : 32.9 gm/dL  Auto Neutrophil # : x  Auto Lymphocyte # : x  Auto Monocyte # : x  Auto Eosinophil # : x  Auto Basophil # : x  Auto Neutrophil % : x  Auto Lymphocyte % : x  Auto Monocyte % : x  Auto Eosinophil % : x  Auto Basophil % : x      06-22    137  |  101  |  30<H>  ----------------------------<  230<H>  3.7   |  25  |  3.40<H>    Ca    7.9<L>      22 Jun 2018 06:58  Phos  3.5     06-22  Mg     2.2     06-22    TPro  6.2  /  Alb  1.9<L>  /  TBili  0.4  /  DBili  x   /  AST  19  /  ALT  18  /  AlkPhos  222<H>  06-22      CAPILLARY BLOOD GLUCOSE      POCT Blood Glucose.: 324 mg/dL (22 Jun 2018 11:41)  POCT Blood Glucose.: 218 mg/dL (22 Jun 2018 08:28)  POCT Blood Glucose.: 193 mg/dL (21 Jun 2018 17:01)      Vital Signs Last 24 Hrs  T(C): 36.4 (22 Jun 2018 05:32), Max: 36.9 (21 Jun 2018 20:42)  T(F): 97.5 (22 Jun 2018 05:32), Max: 98.4 (21 Jun 2018 20:42)  HR: 72 (22 Jun 2018 05:32) (52 - 106)  BP: 136/86 (22 Jun 2018 05:32) (100/68 - 136/86)  BP(mean): --  RR: 18 (22 Jun 2018 05:32) (16 - 18)  SpO2: 99% (22 Jun 2018 05:32) (92% - 99%)

## 2018-06-23 LAB
ANION GAP SERPL CALC-SCNC: 11 MMOL/L — SIGNIFICANT CHANGE UP (ref 5–17)
APTT BLD: 29.5 SEC — SIGNIFICANT CHANGE UP (ref 27.5–37.4)
BUN SERPL-MCNC: 44 MG/DL — HIGH (ref 7–23)
CALCIUM SERPL-MCNC: 7.8 MG/DL — LOW (ref 8.5–10.1)
CHLORIDE SERPL-SCNC: 101 MMOL/L — SIGNIFICANT CHANGE UP (ref 96–108)
CO2 SERPL-SCNC: 26 MMOL/L — SIGNIFICANT CHANGE UP (ref 22–31)
CREAT SERPL-MCNC: 4.6 MG/DL — HIGH (ref 0.5–1.3)
CULTURE RESULTS: SIGNIFICANT CHANGE UP
CULTURE RESULTS: SIGNIFICANT CHANGE UP
GLUCOSE SERPL-MCNC: 211 MG/DL — HIGH (ref 70–99)
HCT VFR BLD CALC: 30.1 % — LOW (ref 39–50)
HGB BLD-MCNC: 9.7 G/DL — LOW (ref 13–17)
INR BLD: 1.3 RATIO — HIGH (ref 0.88–1.16)
MCHC RBC-ENTMCNC: 27 PG — SIGNIFICANT CHANGE UP (ref 27–34)
MCHC RBC-ENTMCNC: 32.2 GM/DL — SIGNIFICANT CHANGE UP (ref 32–36)
MCV RBC AUTO: 83.8 FL — SIGNIFICANT CHANGE UP (ref 80–100)
NRBC # BLD: 0 /100 WBCS — SIGNIFICANT CHANGE UP (ref 0–0)
PLATELET # BLD AUTO: 280 K/UL — SIGNIFICANT CHANGE UP (ref 150–400)
POTASSIUM SERPL-MCNC: 4.2 MMOL/L — SIGNIFICANT CHANGE UP (ref 3.5–5.3)
POTASSIUM SERPL-SCNC: 4.2 MMOL/L — SIGNIFICANT CHANGE UP (ref 3.5–5.3)
PROTHROM AB SERPL-ACNC: 14.2 SEC — HIGH (ref 9.8–12.7)
RBC # BLD: 3.59 M/UL — LOW (ref 4.2–5.8)
RBC # FLD: 16.8 % — HIGH (ref 10.3–14.5)
SODIUM SERPL-SCNC: 138 MMOL/L — SIGNIFICANT CHANGE UP (ref 135–145)
SPECIMEN SOURCE: SIGNIFICANT CHANGE UP
SPECIMEN SOURCE: SIGNIFICANT CHANGE UP
WBC # BLD: 11.3 K/UL — HIGH (ref 3.8–10.5)
WBC # FLD AUTO: 11.3 K/UL — HIGH (ref 3.8–10.5)

## 2018-06-23 RX ORDER — HEPARIN SODIUM 5000 [USP'U]/ML
5000 INJECTION INTRAVENOUS; SUBCUTANEOUS EVERY 12 HOURS
Qty: 0 | Refills: 0 | Status: DISCONTINUED | OUTPATIENT
Start: 2018-06-24 | End: 2018-06-26

## 2018-06-23 RX ORDER — ASPIRIN/CALCIUM CARB/MAGNESIUM 324 MG
81 TABLET ORAL DAILY
Qty: 0 | Refills: 0 | Status: DISCONTINUED | OUTPATIENT
Start: 2018-06-23 | End: 2018-06-26

## 2018-06-23 RX ORDER — WARFARIN SODIUM 2.5 MG/1
2.5 TABLET ORAL ONCE
Qty: 0 | Refills: 0 | Status: COMPLETED | OUTPATIENT
Start: 2018-06-23 | End: 2018-06-23

## 2018-06-23 RX ADMIN — CEFDINIR 300 MILLIGRAM(S): 250 POWDER, FOR SUSPENSION ORAL at 14:50

## 2018-06-23 RX ADMIN — Medication 3: at 17:34

## 2018-06-23 RX ADMIN — FAMOTIDINE 20 MILLIGRAM(S): 10 INJECTION INTRAVENOUS at 14:50

## 2018-06-23 RX ADMIN — Medication 10 MILLIGRAM(S): at 17:33

## 2018-06-23 RX ADMIN — ERYTHROPOIETIN 10000 UNIT(S): 10000 INJECTION, SOLUTION INTRAVENOUS; SUBCUTANEOUS at 12:38

## 2018-06-23 RX ADMIN — Medication 50 MILLIGRAM(S): at 14:50

## 2018-06-23 RX ADMIN — Medication 50 MILLIGRAM(S): at 21:33

## 2018-06-23 RX ADMIN — Medication 50 MILLIGRAM(S): at 05:45

## 2018-06-23 RX ADMIN — Medication 10 MILLIGRAM(S): at 05:45

## 2018-06-23 RX ADMIN — Medication 3 MILLIGRAM(S): at 21:33

## 2018-06-23 RX ADMIN — WARFARIN SODIUM 2.5 MILLIGRAM(S): 2.5 TABLET ORAL at 21:49

## 2018-06-23 RX ADMIN — Medication 325 MILLIGRAM(S): at 14:50

## 2018-06-23 RX ADMIN — Medication 1: at 08:27

## 2018-06-23 NOTE — PROGRESS NOTE ADULT - SUBJECTIVE AND OBJECTIVE BOX
PATIENT DESCRIPTION 6/18/2018 ADMISSION [facility] P  62 year old male with a history of HTN, DM, ESRD on HD, alcohol abuse, PAD s/p left BKA, L leg prosthesis but mostly non-ambulatory cardiac arrest 11/2017, mild AS  prior left BKA amputation.admitted [facility] P  6/18 with pain R foot and was seen by Vasc surg  Arterial dopplers confirm multilevel occlusive disease of his right leg patient  RAKA was done 6/20 Patient was dyspneic on admission  CXR showed bl effsns poss airsapce opac Pt had leukocutyosis and a creat 4.5 Pulm consulted 6/18/2018 CXR findings were felt to be fluid overload and HD was continued Pt was in AF and coumadin was started 6/23     VITALS/LABS  6/23/2018 afeb 83 117/70 16 94%   6/23/2018 W 11.3 Hb 9.7 Plt 280 Na 138 K 4.2 CO2 26 Cr 4.6     PROBLEMS ASSESSMENT PLAN 6/18/2018 ADMISSION [facility] P  RESP   6/20 Resp status improved   6/18 resp distress likely sec to fluid ol Is getting HD  6/18/2018 Monitor PO Target po 90-95%  INFECTION  6/18-6/19-6/21 W 15.8-16.7 -14  6/18 pct 1.3   Cefdinir started 6/22 for uti   Vanco given 6/18  CAD  11/30/2017 ECHO ef 65%   ASA 81 (6/18)   metoporolol 50.3 (6/18)   AF  Coumadin (6/23)   HTN  Norvasc 5 (6/18)   Hydralazine 10.2 (6/18)   ESRD  HD ordered for 6/18 6/20 6/22  PVD PAIN FOOT  On MS Percocet  RAKA 6/20 done Dr Dang  Pt ws cleared from Pulm standpoint is in optimal shape and benefits exceed risks   MICROBIO   6/19 bc n       GLOBAL ISSUE/BEST PRACTICE:        PROBLEM: Analgesia:     na                        PROBLEM: Sedation:     na               PROBLEM: HOB elevation:   y            PROBLEM: Stress ulcer proph:    pepcid 20 (6/18)                       PROBLEM: VTE prophylaxis:      hsc (6/18)                  PROBLEM: Glycemic control:    na  PROBLEM: Nutrition:    cons carb renal chamorro dys 3 soft thin glucerna 1.2 (6/18)           PROBLEM: Advanced directive: na     PROBLEM: Allergies:  na    TIME SPENT Over 25 minutes aggregate care time spent on encounter; activities included   direct patient care, counseling and/or coordinating care reviewing notes, lab data/ imaging , discussion with multidisciplinary team/ patient  /family

## 2018-06-23 NOTE — PROVIDER CONTACT NOTE (OTHER) - ACTION/TREATMENT ORDERED:
Dr. Peterson made aware. Bladder scan and ekg done as per MD ordered. Will continue to monitor
change metoprolol to 4 X a day and give an additional dose now
d/c cont sats
call cardiologist

## 2018-06-23 NOTE — PROGRESS NOTE ADULT - SUBJECTIVE AND OBJECTIVE BOX
JP SANDOVAL  62y  MRN-[id number]    [facility]/ DR. TORRES    POD # 3    Vital Signs Last 24 Hrs  T(C): 36.3 (23 Jun 2018 05:09), Max: 36.3 (22 Jun 2018 21:34)  T(F): 97.4 (23 Jun 2018 05:09), Max: 97.4 (23 Jun 2018 05:09)  HR: 88 (23 Jun 2018 05:09) (76 - 88)  BP: 117/73 (23 Jun 2018 05:09) (117/73 - 139/68)  BP(mean): --  RR: 17 (23 Jun 2018 05:09) (17 - 17)  SpO2: 91% (23 Jun 2018 05:09) (91% - 98%)       PHYSICAL EXAM    RIGHT AKA STUMP    WOUND DRY AND INTACT  STAPLES IN PLACE WITH GOOD APPROXIMATION   NO EDEMA, ERYTHEMA OR DRAINAGE    VIABLE FLAPS     CBC Full  -  ( 23 Jun 2018 06:29 )  WBC Count : 11.30 K/uL  Hemoglobin : 9.7 g/dL  Hematocrit : 30.1 %  Platelet Count - Automated : 280 K/uL  Mean Cell Volume : 83.8 fl  Mean Cell Hemoglobin : 27.0 pg  Mean Cell Hemoglobin Concentration : 32.2 gm/dL  Auto Neutrophil # : x  Auto Lymphocyte # : x  Auto Monocyte # : x  Auto Eosinophil # : x  Auto Basophil # : x  Auto Neutrophil % : x  Auto Lymphocyte % : x  Auto Monocyte % : x  Auto Eosinophil % : x  Auto Basophil % : x    06-23    138  |  101  |  44<H>  ----------------------------<  211<H>  4.2   |  26  |  4.60<H>    Ca    7.8<L>      23 Jun 2018 06:29  Phos  3.5     06-22  Mg     2.2     06-22    TPro  6.2  /  Alb  1.9<L>  /  TBili  0.4  /  DBili  x   /  AST  19  /  ALT  18  /  AlkPhos  222<H>  06-22    ASSESSMENT AND PLAN        POD # 3  S/P RIGHT AKA    VIABLE FLAPS,  HEALING WOUND  DRESSING APPLIED  WILL FOLLOW UP   OK TO RESUME ANTICOAGULATION AND ASPIRIN

## 2018-06-23 NOTE — PROGRESS NOTE ADULT - SUBJECTIVE AND OBJECTIVE BOX
Interval History:    CENTRAL LINE:   [  ] YES       [  ] NO  BARAJAS:                 [  ] YES       [  ] NO         REVIEW OF SYSTEMS:  All Systems below were reviewed and are negative [  ]  HEENT:  ID:  Pulmonary:  Cardiac:  GI:  Renal:  Musculoskeletal:  All other systems above were reviewed and are negative   [  ]      MEDICATIONS  (STANDING):  aspirin enteric coated 81 milliGRAM(s) Oral daily  cefdinir 300 milliGRAM(s) Oral daily  dextrose 5%. 1000 milliLiter(s) (50 mL/Hr) IV Continuous <Continuous>  dextrose 50% Injectable 12.5 Gram(s) IV Push once  dextrose 50% Injectable 25 Gram(s) IV Push once  dextrose 50% Injectable 25 Gram(s) IV Push once  diltiazem    Tablet 30 milliGRAM(s) Oral every 8 hours  epoetin flower Injectable 49808 Unit(s) IV Push <User Schedule>  famotidine    Tablet 20 milliGRAM(s) Oral daily  ferrous    sulfate 325 milliGRAM(s) Oral daily  hydrALAZINE 10 milliGRAM(s) Oral two times a day  insulin lispro (HumaLOG) corrective regimen sliding scale   SubCutaneous three times a day before meals  melatonin 3 milliGRAM(s) Oral at bedtime  metoprolol tartrate 50 milliGRAM(s) Oral every 8 hours  warfarin 2.5 milliGRAM(s) Oral once    MEDICATIONS  (PRN):  acetaminophen   Tablet 650 milliGRAM(s) Oral every 6 hours PRN For Temp greater than 38 C (100.4 F)  acetaminophen   Tablet. 650 milliGRAM(s) Oral every 6 hours PRN Mild Pain (1 - 3)  dextrose 40% Gel 15 Gram(s) Oral once PRN Blood Glucose LESS THAN 70 milliGRAM(s)/deciliter  glucagon  Injectable 1 milliGRAM(s) IntraMuscular once PRN Glucose LESS THAN 70 milligrams/deciliter  morphine  - Injectable 2 milliGRAM(s) IV Push every 4 hours PRN Severe Pain (7 - 10)  oxyCODONE    5 mG/acetaminophen 325 mG 2 Tablet(s) Oral every 4 hours PRN Moderate Pain (4 - 6)      Vital Signs Last 24 Hrs  T(C): 36.8 (23 Jun 2018 17:26), Max: 36.8 (23 Jun 2018 17:26)  T(F): 98.2 (23 Jun 2018 17:26), Max: 98.2 (23 Jun 2018 17:26)  HR: 117 (23 Jun 2018 17:26) (69 - 117)  BP: 100/73 (23 Jun 2018 17:26) (100/73 - 155/78)  BP(mean): --  RR: 16 (23 Jun 2018 17:26) (16 - 18)  SpO2: 100% (23 Jun 2018 17:26) (91% - 100%)    I&O's Summary    23 Jun 2018 07:01  -  23 Jun 2018 19:14  --------------------------------------------------------  IN: 0 mL / OUT: 1500 mL / NET: -1500 mL        PHYSICAL EXAM:  HEENT: NC/AT; PERRLA  Neck: Soft; no tenderness  Lungs: CTA bilaterally; no wheezing.   Heart:  Abdomen:  Genital/ Rectal:  Extremities:  Neurologic:  Vascular:      LABORATORY:    CBC Full  -  ( 23 Jun 2018 06:29 )  WBC Count : 11.30 K/uL  Hemoglobin : 9.7 g/dL  Hematocrit : 30.1 %  Platelet Count - Automated : 280 K/uL  Mean Cell Volume : 83.8 fl  Mean Cell Hemoglobin : 27.0 pg  Mean Cell Hemoglobin Concentration : 32.2 gm/dL  Auto Neutrophil # : x  Auto Lymphocyte # : x  Auto Monocyte # : x  Auto Eosinophil # : x  Auto Basophil # : x  Auto Neutrophil % : x  Auto Lymphocyte % : x  Auto Monocyte % : x  Auto Eosinophil % : x  Auto Basophil % : x      ESR:                   06-18 @ 23:03  --    C-Reactive Protein:     06-18 @ 23:03  --    Procalcitonin:           06-18 @ 23:03   1.30      06-23    138  |  101  |  44<H>  ----------------------------<  211<H>  4.2   |  26  |  4.60<H>    Ca    7.8<L>      23 Jun 2018 06:29  Phos  3.5     06-22  Mg     2.2     06-22    TPro  6.2  /  Alb  1.9<L>  /  TBili  0.4  /  DBili  x   /  AST  19  /  ALT  18  /  AlkPhos  222<H>  06-22          Assessment and Plan:          Leo Schulz MD   [phone]. He is comfortable  No fevers.     MEDICATIONS  (STANDING):  aspirin enteric coated 81 milliGRAM(s) Oral daily  cefdinir 300 milliGRAM(s) Oral daily  dextrose 5%. 1000 milliLiter(s) (50 mL/Hr) IV Continuous <Continuous>  dextrose 50% Injectable 12.5 Gram(s) IV Push once  dextrose 50% Injectable 25 Gram(s) IV Push once  dextrose 50% Injectable 25 Gram(s) IV Push once  diltiazem    Tablet 30 milliGRAM(s) Oral every 8 hours  epoetin flower Injectable 01565 Unit(s) IV Push <User Schedule>  famotidine    Tablet 20 milliGRAM(s) Oral daily  ferrous    sulfate 325 milliGRAM(s) Oral daily  hydrALAZINE 10 milliGRAM(s) Oral two times a day  insulin lispro (HumaLOG) corrective regimen sliding scale   SubCutaneous three times a day before meals  melatonin 3 milliGRAM(s) Oral at bedtime  metoprolol tartrate 50 milliGRAM(s) Oral every 8 hours  warfarin 2.5 milliGRAM(s) Oral once    MEDICATIONS  (PRN):  acetaminophen   Tablet 650 milliGRAM(s) Oral every 6 hours PRN For Temp greater than 38 C (100.4 F)  acetaminophen   Tablet. 650 milliGRAM(s) Oral every 6 hours PRN Mild Pain (1 - 3)  dextrose 40% Gel 15 Gram(s) Oral once PRN Blood Glucose LESS THAN 70 milliGRAM(s)/deciliter  glucagon  Injectable 1 milliGRAM(s) IntraMuscular once PRN Glucose LESS THAN 70 milligrams/deciliter  morphine  - Injectable 2 milliGRAM(s) IV Push every 4 hours PRN Severe Pain (7 - 10)  oxyCODONE    5 mG/acetaminophen 325 mG 2 Tablet(s) Oral every 4 hours PRN Moderate Pain (4 - 6)      Vital Signs Last 24 Hrs  T(C): 36.8 (23 Jun 2018 17:26), Max: 36.8 (23 Jun 2018 17:26)  T(F): 98.2 (23 Jun 2018 17:26), Max: 98.2 (23 Jun 2018 17:26)  HR: 117 (23 Jun 2018 17:26) (69 - 117)  BP: 100/73 (23 Jun 2018 17:26) (100/73 - 155/78)  BP(mean): --  RR: 16 (23 Jun 2018 17:26) (16 - 18)  SpO2: 100% (23 Jun 2018 17:26) (91% - 100%)    I&O's Summary    23 Jun 2018 07:01  -  23 Jun 2018 19:14  --------------------------------------------------------  IN: 0 mL / OUT: 1500 mL / NET: -1500 mL        PHYSICAL EXAM:  HEENT: NC/AT; PERRLA  Neck: Soft; no tenderness  Lungs: CTA bilaterally; no wheezing.   Heart: RRR; no murmurs.  Abdomen: Soft; no masses.  Extremities: No ulcers.  Neurologic: Awake.      LABORATORY:    CBC Full  -  ( 23 Jun 2018 06:29 )  WBC Count : 11.30 K/uL  Hemoglobin : 9.7 g/dL  Hematocrit : 30.1 %  Platelet Count - Automated : 280 K/uL  Mean Cell Volume : 83.8 fl  Mean Cell Hemoglobin : 27.0 pg  Mean Cell Hemoglobin Concentration : 32.2 gm/dL  Auto Neutrophil # : x  Auto Lymphocyte # : x  Auto Monocyte # : x  Auto Eosinophil # : x  Auto Basophil # : x  Auto Neutrophil % : x  Auto Lymphocyte % : x  Auto Monocyte % : x  Auto Eosinophil % : x  Auto Basophil % : x      ESR:                   06-18 @ 23:03  --    C-Reactive Protein:     06-18 @ 23:03  --    Procalcitonin:           06-18 @ 23:03   1.30      06-23    138  |  101  |  44<H>  ----------------------------<  211<H>  4.2   |  26  |  4.60<H>    Ca    7.8<L>      23 Jun 2018 06:29  Phos  3.5     06-22  Mg     2.2     06-22    TPro  6.2  /  Alb  1.9<L>  /  TBili  0.4  /  DBili  x   /  AST  19  /  ALT  18  /  AlkPhos  222<H>  06-22      Assessment and Plan:    1. R foot with gangrene, s/p R AKA.   2. Severe PAD.  3. ESRD on HD  4. UTI.    . All cultures with no growth. Continue Cefdinir 300 mg po daily for 2 days.  . Wound care daily.   . Discharge planning.       Leo Schulz MD   [phone].

## 2018-06-23 NOTE — PROGRESS NOTE ADULT - SUBJECTIVE AND OBJECTIVE BOX
Chief Complaint: Right foot gangrene    Interval Events: No events overnight    Review of Systems:  General: No fevers, chills, weight loss or gain  Skin: No rashes, color changes  Cardiovascular: No chest pain, orthopnea  Respiratory: No shortness of breath, cough  Gastrointestinal: No nausea, abdominal pain  Genitourinary: No incontinence, pain with urination  Musculoskeletal: No pain, swelling, decreased range of motion  Neurological: No headache, weakness  Psychiatric: No depression, anxiety  Endocrine: No weight loss or gain, increased thirst  All other systems are comprehensively negative.    Physical Exam:  Vital Signs Last 24 Hrs  T(C): 36.3 (23 Jun 2018 05:09), Max: 36.3 (22 Jun 2018 21:34)  T(F): 97.4 (23 Jun 2018 05:09), Max: 97.4 (23 Jun 2018 05:09)  HR: 83 (23 Jun 2018 08:14) (76 - 88)  BP: 117/73 (23 Jun 2018 05:09) (117/73 - 139/68)  BP(mean): --  RR: 17 (23 Jun 2018 05:09) (17 - 17)  SpO2: 94% (23 Jun 2018 08:14) (91% - 98%)  General: NAD  HEENT: MMM  Neck: No JVD, no carotid bruit  Lungs: CTAB  CV: Irregular, nl S1/S2, no M/R/G  Abdomen: S/NT/ND, +BS  Extremities: Left BKA, right AKA  Neuro: AAOx3, non-focal  Skin: No rash    Labs:             06-23    138  |  101  |  44<H>  ----------------------------<  211<H>  4.2   |  26  |  4.60<H>    Ca    7.8<L>      23 Jun 2018 06:29  Phos  3.5     06-22  Mg     2.2     06-22    TPro  6.2  /  Alb  1.9<L>  /  TBili  0.4  /  DBili  x   /  AST  19  /  ALT  18  /  AlkPhos  222<H>  06-22                        9.7    11.30 )-----------( 280      ( 23 Jun 2018 06:29 )             30.1       Telemetry: AF

## 2018-06-23 NOTE — PROGRESS NOTE ADULT - SUBJECTIVE AND OBJECTIVE BOX
Chart and available morning labs /imaging are reviewed electronically , urgent issues addressed . More information  is being added upon completion of rounds , when more information is collected and management discussed with consultants , medical staff and social service/case management on the floor   No new issues reported by medical staff . All above noted   Interval Events: No events overnight SEEN IN HD UNIT   Patient is resting in a bed comfortably .Confused ,poor mentation .No distress noted   Review of Systems:  General: No fevers, chills, weight loss or gain  Skin: No rashes, color changes  Cardiovascular: No chest pain, orthopnea  Respiratory: No shortness of breath, cough  Gastrointestinal: No nausea, abdominal pain  Genitourinary: No incontinence, pain with urination  Musculoskeletal: No pain, swelling, decreased range of motion  Neurological: No headache, weakness  Psychiatric: No depression, anxiety  Endocrine: No weight loss or gain, increased thirst  All other systems are comprehensively negative.    Physical Exam:  Vital Signs Last 24 Hrs  T(C): 36.3 (23 Jun 2018 05:09), Max: 36.3 (22 Jun 2018 21:34)  T(F): 97.4 (23 Jun 2018 05:09), Max: 97.4 (23 Jun 2018 05:09)  HR: 83 (23 Jun 2018 08:14) (76 - 88)  BP: 117/73 (23 Jun 2018 05:09) (117/73 - 139/68)  BP(mean): --  RR: 17 (23 Jun 2018 05:09) (17 - 17)  SpO2: 94% (23 Jun 2018 08:14) (91% - 98%)  General: NAD  HEENT: MMM  Neck: No JVD, no carotid bruit  Lungs: CTAB  CV: Irregular, nl S1/S2, no M/R/G  Abdomen: S/NT/ND, +BS  Extremities: Left BKA, right AKA  Neuro: AAOx3, non-focal  Skin: No rash    Labs:             06-23    138  |  101  |  44<H>  ----------------------------<  211<H>  4.2   |  26  |  4.60<H>    Ca    7.8<L>      23 Jun 2018 06:29  Phos  3.5     06-22  Mg     2.2     06-22    TPro  6.2  /  Alb  1.9<L>  /  TBili  0.4  /  DBili  x   /  AST  19  /  ALT  18  /  AlkPhos  222<H>  06-22                        9.7    11.30 )-----------( 280      ( 23 Jun 2018 06:29 )             30.1

## 2018-06-23 NOTE — PROGRESS NOTE ADULT - SUBJECTIVE AND OBJECTIVE BOX
Patient is a 62y Male whom presented to the hospital with esrd on hd     PAST MEDICAL & SURGICAL HISTORY:  TIA (transient ischemic attack)  ESRD (end stage renal disease) on dialysis  ASHD (arteriosclerotic heart disease)  Cardiac arrest  CHF (congestive heart failure)  Anemia  HTN (hypertension)  Peripheral Arterial Disease  Controlled Type 2 Diabetes with Leg or Foot Ulcer  ETOH Abuse  Amputation, Below Knee, Unilateral, Traumatic      MEDICATIONS  (STANDING):  aspirin  chewable 81 milliGRAM(s) Oral daily  dextrose 5%. 1000 milliLiter(s) (50 mL/Hr) IV Continuous <Continuous>  dextrose 50% Injectable 12.5 Gram(s) IV Push once  dextrose 50% Injectable 25 Gram(s) IV Push once  dextrose 50% Injectable 25 Gram(s) IV Push once  famotidine    Tablet 20 milliGRAM(s) Oral daily  ferrous    sulfate 325 milliGRAM(s) Oral daily  heparin  Injectable 5000 Unit(s) SubCutaneous every 12 hours  hydrALAZINE 10 milliGRAM(s) Oral two times a day  insulin lispro (HumaLOG) corrective regimen sliding scale   SubCutaneous three times a day before meals  melatonin 3 milliGRAM(s) Oral at bedtime  metoprolol tartrate 50 milliGRAM(s) Oral every 8 hours        REVIEW OF SYSTEMS:    CONSTITUTIONAL: No weakness, fevers or chills  EYES/ENT:   no throat pain   NECK: No pain or stiffness  RESPIRATORY: no cough, wheezing, hemoptysis; pos  shortness of breath  CARDIOVASCULAR: No chest pain or palpitations  GASTROINTESTINAL: No abdominal or epigastric pain. No nausea, vomiting,     No diarrhea or constipation. No melena   SKIN: dry  , DISCOLORATION OF R  FOREFOOT  ,S/P L BKA                                                                       9.7    11.30 )-----------( 280      ( 23 Jun 2018 06:29 )             30.1       CBC Full  -  ( 23 Jun 2018 06:29 )  WBC Count : 11.30 K/uL  Hemoglobin : 9.7 g/dL  Hematocrit : 30.1 %  Platelet Count - Automated : 280 K/uL  Mean Cell Volume : 83.8 fl  Mean Cell Hemoglobin : 27.0 pg  Mean Cell Hemoglobin Concentration : 32.2 gm/dL  Auto Neutrophil # : x  Auto Lymphocyte # : x  Auto Monocyte # : x  Auto Eosinophil # : x  Auto Basophil # : x  Auto Neutrophil % : x  Auto Lymphocyte % : x  Auto Monocyte % : x  Auto Eosinophil % : x  Auto Basophil % : x      06-23    138  |  101  |  44<H>  ----------------------------<  211<H>  4.2   |  26  |  4.60<H>    Ca    7.8<L>      23 Jun 2018 06:29  Phos  3.5     06-22  Mg     2.2     06-22    TPro  6.2  /  Alb  1.9<L>  /  TBili  0.4  /  DBili  x   /  AST  19  /  ALT  18  /  AlkPhos  222<H>  06-22      CAPILLARY BLOOD GLUCOSE      POCT Blood Glucose.: 149 mg/dL (23 Jun 2018 11:43)  POCT Blood Glucose.: 195 mg/dL (23 Jun 2018 08:09)  POCT Blood Glucose.: 228 mg/dL (22 Jun 2018 21:07)  POCT Blood Glucose.: 385 mg/dL (22 Jun 2018 17:20)      Vital Signs Last 24 Hrs  T(C): 36.6 (23 Jun 2018 09:30), Max: 36.6 (23 Jun 2018 09:30)  T(F): 97.8 (23 Jun 2018 09:30), Max: 97.8 (23 Jun 2018 09:30)  HR: 69 (23 Jun 2018 09:30) (69 - 88)  BP: 122/72 (23 Jun 2018 09:30) (117/73 - 139/68)  BP(mean): --  RR: 18 (23 Jun 2018 09:30) (16 - 18)  SpO2: 99% (23 Jun 2018 09:30) (91% - 99%)        PT/INR - ( 23 Jun 2018 10:18 )   PT: 14.2 sec;   INR: 1.30 ratio         PTT - ( 23 Jun 2018 10:18 )  PTT:29.5 sec                                                  PHYSICAL EXAM:    Constitutional: NAD  HEENT: conjunctive   clear   Neck:  No JVD  Respiratory: CTAB  Cardiovascular: S1 and S2  Gastrointestinal: BS+, soft, NT/ND  Extremities: pos  peripheral edema  Neurological: , no focal deficits             RIGHT AKA STUMP

## 2018-06-23 NOTE — PROGRESS NOTE ADULT - SUBJECTIVE AND OBJECTIVE BOX
Patient seen /examined today Plan discussed/Questions answered  (patient/ancillary staff/colleagues) Chart notes reviewd More detailed Pulm/Critical Care notes, assessment and plan  will be added later today as needed after reviewing further labs as they become available     Notable interval events reviewed    ROS/PE  .

## 2018-06-24 ENCOUNTER — TRANSCRIPTION ENCOUNTER (OUTPATIENT)
Age: 63
End: 2018-06-24

## 2018-06-24 LAB
ANION GAP SERPL CALC-SCNC: 10 MMOL/L — SIGNIFICANT CHANGE UP (ref 5–17)
BUN SERPL-MCNC: 28 MG/DL — HIGH (ref 7–23)
CALCIUM SERPL-MCNC: 7.7 MG/DL — LOW (ref 8.5–10.1)
CHLORIDE SERPL-SCNC: 102 MMOL/L — SIGNIFICANT CHANGE UP (ref 96–108)
CO2 SERPL-SCNC: 26 MMOL/L — SIGNIFICANT CHANGE UP (ref 22–31)
CREAT SERPL-MCNC: 3.4 MG/DL — HIGH (ref 0.5–1.3)
CULTURE RESULTS: SIGNIFICANT CHANGE UP
CULTURE RESULTS: SIGNIFICANT CHANGE UP
GLUCOSE SERPL-MCNC: 198 MG/DL — HIGH (ref 70–99)
HCT VFR BLD CALC: 30.1 % — LOW (ref 39–50)
HGB BLD-MCNC: 9.6 G/DL — LOW (ref 13–17)
INR BLD: 1.31 RATIO — HIGH (ref 0.88–1.16)
MCHC RBC-ENTMCNC: 27.1 PG — SIGNIFICANT CHANGE UP (ref 27–34)
MCHC RBC-ENTMCNC: 31.9 GM/DL — LOW (ref 32–36)
MCV RBC AUTO: 85 FL — SIGNIFICANT CHANGE UP (ref 80–100)
NRBC # BLD: 0 /100 WBCS — SIGNIFICANT CHANGE UP (ref 0–0)
PLATELET # BLD AUTO: 302 K/UL — SIGNIFICANT CHANGE UP (ref 150–400)
POTASSIUM SERPL-MCNC: 4.3 MMOL/L — SIGNIFICANT CHANGE UP (ref 3.5–5.3)
POTASSIUM SERPL-SCNC: 4.3 MMOL/L — SIGNIFICANT CHANGE UP (ref 3.5–5.3)
PROTHROM AB SERPL-ACNC: 14.4 SEC — HIGH (ref 9.8–12.7)
RBC # BLD: 3.54 M/UL — LOW (ref 4.2–5.8)
RBC # FLD: 16.6 % — HIGH (ref 10.3–14.5)
SODIUM SERPL-SCNC: 138 MMOL/L — SIGNIFICANT CHANGE UP (ref 135–145)
SPECIMEN SOURCE: SIGNIFICANT CHANGE UP
SPECIMEN SOURCE: SIGNIFICANT CHANGE UP
WBC # BLD: 9.33 K/UL — SIGNIFICANT CHANGE UP (ref 3.8–10.5)
WBC # FLD AUTO: 9.33 K/UL — SIGNIFICANT CHANGE UP (ref 3.8–10.5)

## 2018-06-24 RX ORDER — WARFARIN SODIUM 2.5 MG/1
2.5 TABLET ORAL ONCE
Qty: 0 | Refills: 0 | Status: COMPLETED | OUTPATIENT
Start: 2018-06-24 | End: 2018-06-24

## 2018-06-24 RX ORDER — INSULIN LISPRO 100/ML
VIAL (ML) SUBCUTANEOUS AT BEDTIME
Qty: 0 | Refills: 0 | Status: DISCONTINUED | OUTPATIENT
Start: 2018-06-24 | End: 2018-06-26

## 2018-06-24 RX ORDER — INSULIN LISPRO 100/ML
VIAL (ML) SUBCUTANEOUS
Qty: 0 | Refills: 0 | Status: DISCONTINUED | OUTPATIENT
Start: 2018-06-24 | End: 2018-06-26

## 2018-06-24 RX ADMIN — Medication 50 MILLIGRAM(S): at 13:15

## 2018-06-24 RX ADMIN — Medication 50 MILLIGRAM(S): at 21:55

## 2018-06-24 RX ADMIN — HEPARIN SODIUM 5000 UNIT(S): 5000 INJECTION INTRAVENOUS; SUBCUTANEOUS at 17:44

## 2018-06-24 RX ADMIN — FAMOTIDINE 20 MILLIGRAM(S): 10 INJECTION INTRAVENOUS at 11:16

## 2018-06-24 RX ADMIN — Medication 3: at 12:44

## 2018-06-24 RX ADMIN — Medication 325 MILLIGRAM(S): at 11:16

## 2018-06-24 RX ADMIN — Medication 2: at 17:38

## 2018-06-24 RX ADMIN — Medication 10 MILLIGRAM(S): at 17:41

## 2018-06-24 RX ADMIN — CEFDINIR 300 MILLIGRAM(S): 250 POWDER, FOR SUSPENSION ORAL at 11:16

## 2018-06-24 RX ADMIN — Medication 50 MILLIGRAM(S): at 05:31

## 2018-06-24 RX ADMIN — Medication 81 MILLIGRAM(S): at 11:16

## 2018-06-24 RX ADMIN — HEPARIN SODIUM 5000 UNIT(S): 5000 INJECTION INTRAVENOUS; SUBCUTANEOUS at 05:31

## 2018-06-24 RX ADMIN — WARFARIN SODIUM 2.5 MILLIGRAM(S): 2.5 TABLET ORAL at 21:55

## 2018-06-24 RX ADMIN — Medication 3 MILLIGRAM(S): at 21:55

## 2018-06-24 RX ADMIN — Medication 10 MILLIGRAM(S): at 05:31

## 2018-06-24 RX ADMIN — Medication 2: at 08:19

## 2018-06-24 RX ADMIN — Medication 1: at 22:21

## 2018-06-24 NOTE — PROGRESS NOTE ADULT - SUBJECTIVE AND OBJECTIVE BOX
VITALS/LABS  6/24/2018 afeb 93 130/70 16 94%   6/24/2018 W 9.3 Hb 9.6 Plt 302  Na 138 K 4.3 CO2 25 Cr 3.4     REVIEW OF SYMPTOMS    Able to give ROS  Confused unable to give much ros    PHYSICAL EXAM  sp old bka  HEENT Unremarkable PERRLA atraumatic   RESP Fair air entry EXP prolonged    Harsh breath sound Resp distres mild   CARDIAC S1 S2 No S3     NO JVD    ABDOMEN SOFT BS PRESENT NOT DISTENDED No hepatosplenomegaly PEDAL EDEMA present No calf tenderness  NO rash   GENERAL Not TOXIC looking    PATIENT DESCRIPTION HOSPITAL COURSE  6/18/2018 ADMISSION [facility]  62 year old male with a history of HTN, DM, ESRD on HD, alcohol abuse, PAD s/p left BKA, L leg prosthesis but mostly non-ambulatory cardiac arrest 11/2017, mild AS  prior left BKA amputation.admitted [facility]  6/18 with pain R foot and was seen by Vas surg  Arterial dopplers confirm multilevel occlusive disease of his right leg patient  RAKA was done 6/20 Patient was dyspneic on admission  CXR showed bl effsns poss airsapce opac Pt had leukocutyosis and a creat 4.5 Pulm consulted 6/18/2018 CXR findings were felt to be fluid overload and HD was continued Dyspnea improved  Pt was in AF and coumadin was started 6/23 11/30/2017 ECHO ef 65%     ASSESSMENT RECOMMENDATIONS  6/18/2018 ADMISSION [facility]  RESP   Resp status improved   Resp distress was likely sec to fluid ol Is getting HD  Monitor PO Target po 90-95%  INFECTION POSSIBLE UTI  Cefdinir started 6/22 for uti DR Dutton  CAD  On  ASA 81 (6/18)  metoporolol 50.3 (6/18) No ongoing ischemic   AF NEEDS DAILY DOSING   Coumadin (6/23)   ESRD  HD   PVD PAIN FOOT  On MS Percocet  RAKA 6/20 done Dr Dang    GLOBAL ISSUE/BEST PRACTICE:        PROBLEM: Analgesia:     na                        PROBLEM: Sedation:     na               PROBLEM: HOB elevation:   y            PROBLEM: Stress ulcer proph:    pepcid 20 (6/18)                       PROBLEM: VTE prophylaxis:      hsc (6/18)  Coumadin (6/23)                 PROBLEM: Glycemic control:    na  PROBLEM: Nutrition:    cons carb renal chamorro dys 3 soft thin glucerna 1.2 (6/18)           PROBLEM: Advanced directive: na     PROBLEM: Allergies:  na    TIME SPENT Over 25 minutes aggregate care time spent on encounter; activities included   direct patient care, counseling and/or coordinating care reviewing notes, lab data/ imaging , discussion with multidisciplinary team/ patient  /family

## 2018-06-24 NOTE — DISCHARGE NOTE ADULT - CARE PLAN
Principal Discharge DX:	Ischemic foot  Goal:	s/p R AKA  Assessment and plan of treatment:	CLEARED BY SURG SERVICE FOR D/C ,CONTINUE WOUND CARE ,FOLLOWUP WITH VASC SX  AS OUTPATIENT  Secondary Diagnosis:	Peripheral Arterial Disease  Assessment and plan of treatment:	continue current managmnet and medications  Secondary Diagnosis:	ESRD (end stage renal disease) on dialysis  Assessment and plan of treatment:	HD AS PER   Secondary Diagnosis:	CHF (congestive heart failure)  Assessment and plan of treatment:	Recommended low salt,lowfat diet, continue current cardiac meds, f/,up with cardilogist,, BP control and monitoring, Echocardiogramm every 6-12 mnth to evaluate LV ef, monitor fluid status,electrolytes  and renal profile closely due to diuretics administartion. Weight control, notify physicain about weight gain 2lbs in 2-3 days  Secondary Diagnosis:	ASHD (arteriosclerotic heart disease)  Assessment and plan of treatment:	continue current managmnet and medications  Secondary Diagnosis:	Anemia in chronic kidney disease, on chronic dialysis  Assessment and plan of treatment:	serial cbc , followup with PCP  Secondary Diagnosis:	HTN (hypertension)  Assessment and plan of treatment:	BP monitoring,continue current antihypertensive meds, low salt diet,followup with PMD

## 2018-06-24 NOTE — PROGRESS NOTE ADULT - SUBJECTIVE AND OBJECTIVE BOX
Chart and available morning labs /imaging are reviewed electronically , urgent issues addressed . More information  is being added upon completion of rounds , when more information is collected and management discussed with consultants , medical staff and social service/case management on the floor Chief Complaint: Right foot gangrene  Interval Events: No events overnight. No complaints.  Review of Systems:  General: No fevers, chills, weight loss or gain  Skin: No rashes, color changes  Cardiovascular: No chest pain, orthopnea  Respiratory: No shortness of breath, cough  Gastrointestinal: No nausea, abdominal pain  Genitourinary: No incontinence, pain with urination  Musculoskeletal: No pain, swelling, decreased range of motion  Neurological: No headache, weakness  Psychiatric: No depression, anxiety  Endocrine: No weight loss or gain, increased thirst  All other systems are comprehensively negative.  Physical Exam:  Vital Signs Last 24 Hrs  T(C): 36.8 (24 Jun 2018 05:20), Max: 36.9 (23 Jun 2018 21:10)  T(F): 98.3 (24 Jun 2018 05:20), Max: 98.4 (23 Jun 2018 21:10)  HR: 115 (24 Jun 2018 05:20) (69 - 117)  BP: 145/76 (24 Jun 2018 05:20) (100/73 - 155/78)  BP(mean): --  RR: 17 (24 Jun 2018 05:20) (16 - 18)  SpO2: 94% (23 Jun 2018 21:10) (94% - 100%)  General: NAD  HEENT: MMM  Neck: No JVD, no carotid bruit  Lungs: CTAB  CV: Irregular, nl S1/S2, no M/R/G  Abdomen: S/NT/ND, +BS  Extremities: Left BKA, right AKA  Neuro: AAOx3, non-focal  Skin: No rash  Labs:           06-24  138  |  102  |  28<H>  ----------------------------<  198<H>  4.3   |  26  |  3.40<H>    Ca    7.7<L>      24 Jun 2018 06:58                          9.6    9.33  )-----------( 302      ( 24 Jun 2018 06:58 )             30.1   Labs and imaging reviewed electronically

## 2018-06-24 NOTE — DISCHARGE NOTE ADULT - SECONDARY DIAGNOSIS.
Peripheral Arterial Disease ESRD (end stage renal disease) on dialysis CHF (congestive heart failure) ASHD (arteriosclerotic heart disease) Anemia in chronic kidney disease, on chronic dialysis HTN (hypertension)

## 2018-06-24 NOTE — DISCHARGE NOTE ADULT - CONDITIONS AT DISCHARGE
Patient is alert and oriented x2. IV saline locked removed at time of discharge with no noted complications. Patient awaiting transport to [facility]. Patient left the unit in stable condition via stretcher by ambulance. Patient is alert and oriented x2. IV saline locked removed at time of discharge with no noted complications. Patient awaiting transport to [facility] . Patient left the unit in stable condition via stretcher by ambulance.

## 2018-06-24 NOTE — PROGRESS NOTE ADULT - SUBJECTIVE AND OBJECTIVE BOX
Interval History:    CENTRAL LINE:   [  ] YES       [  ] NO  BARAJAS:                 [  ] YES       [  ] NO         REVIEW OF SYSTEMS:  All Systems below were reviewed and are negative [  ]  HEENT:  ID:  Pulmonary:  Cardiac:  GI:  Renal:  Musculoskeletal:  All other systems above were reviewed and are negative   [  ]      MEDICATIONS  (STANDING):  aspirin enteric coated 81 milliGRAM(s) Oral daily  cefdinir 300 milliGRAM(s) Oral daily  dextrose 5%. 1000 milliLiter(s) (50 mL/Hr) IV Continuous <Continuous>  dextrose 50% Injectable 12.5 Gram(s) IV Push once  dextrose 50% Injectable 25 Gram(s) IV Push once  dextrose 50% Injectable 25 Gram(s) IV Push once  diltiazem    Tablet 30 milliGRAM(s) Oral every 8 hours  epoetin flower Injectable 16051 Unit(s) IV Push <User Schedule>  famotidine    Tablet 20 milliGRAM(s) Oral daily  ferrous    sulfate 325 milliGRAM(s) Oral daily  heparin  Injectable 5000 Unit(s) SubCutaneous every 12 hours  hydrALAZINE 10 milliGRAM(s) Oral two times a day  insulin lispro (HumaLOG) corrective regimen sliding scale   SubCutaneous three times a day before meals  melatonin 3 milliGRAM(s) Oral at bedtime  metoprolol tartrate 50 milliGRAM(s) Oral every 8 hours  warfarin 2.5 milliGRAM(s) Oral once    MEDICATIONS  (PRN):  acetaminophen   Tablet 650 milliGRAM(s) Oral every 6 hours PRN For Temp greater than 38 C (100.4 F)  acetaminophen   Tablet. 650 milliGRAM(s) Oral every 6 hours PRN Mild Pain (1 - 3)  dextrose 40% Gel 15 Gram(s) Oral once PRN Blood Glucose LESS THAN 70 milliGRAM(s)/deciliter  glucagon  Injectable 1 milliGRAM(s) IntraMuscular once PRN Glucose LESS THAN 70 milligrams/deciliter  morphine  - Injectable 2 milliGRAM(s) IV Push every 4 hours PRN Severe Pain (7 - 10)  oxyCODONE    5 mG/acetaminophen 325 mG 2 Tablet(s) Oral every 4 hours PRN Moderate Pain (4 - 6)      Vital Signs Last 24 Hrs  T(C): 36.5 (24 Jun 2018 20:29), Max: 37 (24 Jun 2018 13:28)  T(F): 97.7 (24 Jun 2018 20:29), Max: 98.6 (24 Jun 2018 13:28)  HR: 87 (24 Jun 2018 20:29) (49 - 115)  BP: 120/77 (24 Jun 2018 17:28) (117/73 - 145/76)  BP(mean): --  RR: 16 (24 Jun 2018 20:29) (16 - 17)  SpO2: 98% (24 Jun 2018 20:29) (94% - 98%)    I&O's Summary    23 Jun 2018 07:01  -  24 Jun 2018 07:00  --------------------------------------------------------  IN: 0 mL / OUT: 1500 mL / NET: -1500 mL        PHYSICAL EXAM:  HEENT: NC/AT; PERRLA  Neck: Soft; no tenderness  Lungs: CTA bilaterally; no wheezing.   Heart:  Abdomen:  Genital/ Rectal:  Extremities:  Neurologic:  Vascular:      LABORATORY:    CBC Full  -  ( 24 Jun 2018 06:58 )  WBC Count : 9.33 K/uL  Hemoglobin : 9.6 g/dL  Hematocrit : 30.1 %  Platelet Count - Automated : 302 K/uL  Mean Cell Volume : 85.0 fl  Mean Cell Hemoglobin : 27.1 pg  Mean Cell Hemoglobin Concentration : 31.9 gm/dL  Auto Neutrophil # : x  Auto Lymphocyte # : x  Auto Monocyte # : x  Auto Eosinophil # : x  Auto Basophil # : x  Auto Neutrophil % : x  Auto Lymphocyte % : x  Auto Monocyte % : x  Auto Eosinophil % : x  Auto Basophil % : x      ESR:                   06-18 @ 23:03  --    C-Reactive Protein:     06-18 @ 23:03  --    Procalcitonin:           06-18 @ 23:03   1.30      06-24    138  |  102  |  28<H>  ----------------------------<  198<H>  4.3   |  26  |  3.40<H>    Ca    7.7<L>      24 Jun 2018 06:58            Assessment and Plan:          Leo Schulz MD   [phone]. He is comfortable  No new events.       MEDICATIONS  (STANDING):  aspirin enteric coated 81 milliGRAM(s) Oral daily  cefdinir 300 milliGRAM(s) Oral daily  dextrose 5%. 1000 milliLiter(s) (50 mL/Hr) IV Continuous <Continuous>  dextrose 50% Injectable 12.5 Gram(s) IV Push once  dextrose 50% Injectable 25 Gram(s) IV Push once  dextrose 50% Injectable 25 Gram(s) IV Push once  diltiazem    Tablet 30 milliGRAM(s) Oral every 8 hours  epoetin flower Injectable 23871 Unit(s) IV Push <User Schedule>  famotidine    Tablet 20 milliGRAM(s) Oral daily  ferrous    sulfate 325 milliGRAM(s) Oral daily  heparin  Injectable 5000 Unit(s) SubCutaneous every 12 hours  hydrALAZINE 10 milliGRAM(s) Oral two times a day  insulin lispro (HumaLOG) corrective regimen sliding scale   SubCutaneous three times a day before meals  melatonin 3 milliGRAM(s) Oral at bedtime  metoprolol tartrate 50 milliGRAM(s) Oral every 8 hours  warfarin 2.5 milliGRAM(s) Oral once    MEDICATIONS  (PRN):  acetaminophen   Tablet 650 milliGRAM(s) Oral every 6 hours PRN For Temp greater than 38 C (100.4 F)  acetaminophen   Tablet. 650 milliGRAM(s) Oral every 6 hours PRN Mild Pain (1 - 3)  dextrose 40% Gel 15 Gram(s) Oral once PRN Blood Glucose LESS THAN 70 milliGRAM(s)/deciliter  glucagon  Injectable 1 milliGRAM(s) IntraMuscular once PRN Glucose LESS THAN 70 milligrams/deciliter  morphine  - Injectable 2 milliGRAM(s) IV Push every 4 hours PRN Severe Pain (7 - 10)  oxyCODONE    5 mG/acetaminophen 325 mG 2 Tablet(s) Oral every 4 hours PRN Moderate Pain (4 - 6)      Vital Signs Last 24 Hrs  T(C): 36.5 (24 Jun 2018 20:29), Max: 37 (24 Jun 2018 13:28)  T(F): 97.7 (24 Jun 2018 20:29), Max: 98.6 (24 Jun 2018 13:28)  HR: 87 (24 Jun 2018 20:29) (49 - 115)  BP: 120/77 (24 Jun 2018 17:28) (117/73 - 145/76)  BP(mean): --  RR: 16 (24 Jun 2018 20:29) (16 - 17)  SpO2: 98% (24 Jun 2018 20:29) (94% - 98%)    I&O's Summary    23 Jun 2018 07:01  -  24 Jun 2018 07:00  --------------------------------------------------------  IN: 0 mL / OUT: 1500 mL / NET: -1500 mL      PHYSICAL EXAM:  HEENT: NC/AT; PERRLA  Neck: Soft; no tenderness  Lungs: Coarse BS bilaterally; no wheezing.   Heart: RRR; no murmurs.  Abdomen: Soft; no masses.   Extremities: No ulcers.    LABORATORY:    CBC Full  -  ( 24 Jun 2018 06:58 )  WBC Count : 9.33 K/uL  Hemoglobin : 9.6 g/dL  Hematocrit : 30.1 %  Platelet Count - Automated : 302 K/uL  Mean Cell Volume : 85.0 fl  Mean Cell Hemoglobin : 27.1 pg  Mean Cell Hemoglobin Concentration : 31.9 gm/dL  Auto Neutrophil # : x  Auto Lymphocyte # : x  Auto Monocyte # : x  Auto Eosinophil # : x  Auto Basophil # : x  Auto Neutrophil % : x  Auto Lymphocyte % : x  Auto Monocyte % : x  Auto Eosinophil % : x  Auto Basophil % : x      138  |  102  |  28<H>  ----------------------------<  198<H>  4.3   |  26  |  3.40<H>    Ca    7.7<L>      24 Jun 2018 06:58      Assessment and Plan:    1. R foot with gangrene, s/p R AKA.   2. Severe PAD.  3. ESRD on HD  4. UTI.    . Continue Cefdinir 300 mg po daily for one more day.   . Wound care daily.   . Discharge planning.         Leo Schulz MD   [phone].

## 2018-06-24 NOTE — DISCHARGE NOTE ADULT - FINDINGS/TREATMENT
CLEAR FROM VASCULAR SURGERY STAND POINT FOR DISCHARGE   STAPLES TO STAY IN PLACE FOR THE NEXT 3 WEEKS  XEROFORM  OVER THE STAPLES , LOOSE 4X4, KEMAL  AND ACE BANDAGE ON THE STUMP

## 2018-06-24 NOTE — DISCHARGE NOTE ADULT - MEDICATION SUMMARY - MEDICATIONS TO TAKE
I will START or STAY ON the medications listed below when I get home from the hospital:    acetaminophen 325 mg oral tablet  -- 2 tab(s) by mouth every 6 hours, As needed, For Temp greater than 38 C (100.4 F)  -- Indication: For FEVER    acetaminophen 325 mg oral tablet  -- 2 tab(s) by mouth every 6 hours, As needed, Mild Pain (1 - 3)  -- Indication: For PAIN    warfarin 3 mg oral tablet  -- 1 tab(s) by mouth once a day (in the evening HOLD FOR INR ABOVE 3.0 )  -- Indication: For TIA (transient ischemic attack)    insulin lispro 100 units/mL subcutaneous solution  --  subcutaneous 4 times a day (before meals and at bedtime); 1 Unit(s) if Glucose 151 - 200  2 Unit(s) if Glucose 201 - 250  3 Unit(s) if Glucose 251 - 300  4 Unit(s) if Glucose 301 - 350  5 Unit(s) if Glucose 351 - 400  6 Unit(s) if Glucose Greater Than 400  -- Indication: For DM    metoprolol tartrate 25 mg oral tablet  -- 1 tab(s) by mouth every 8 hours  -- Indication: For HTN (hypertension)    ipratropium-albuterol 0.5 mg-2.5 mg/3 mLinhalation solution  -- 3 milliliter(s) inhaled every 6 hours  -- Indication: For COPD    budesonide-formoterol 160 mcg-4.5 mcg/inh inhalation aerosol  -- inhaled 2 times a day  -- Indication: For COPD    Norvasc 5 mg oral tablet  -- 1 tab(s) by mouth once a day  -- Indication: For HTN (hypertension)    furosemide 40 mg oral tablet  -- 1 tab(s) by mouth once a day  -- Indication: For CHF (congestive heart failure)    Procrit 3000 units/mL injectable solution  -- injectable 3 times a week. dose per renal doctor.  -- Indication: For Anemia    ferrous sulfate 325 mg (65 mg elemental iron) oral delayed release tablet  -- 1 tab(s) by mouth once a day  -- Indication: For Anemia    Protonix 40 mg oral delayed release tablet  -- 1 tab(s) by mouth once a day   -- It is very important that you take or use this exactly as directed.  Do not skip doses or discontinue unless directed by your doctor.  Obtain medical advice before taking any non-prescription drugs as some may affect the action of this medication.  Swallow whole.  Do not crush.    -- Indication: For GERD    hydrALAZINE 25 mg oral tablet  -- 1 tab(s) by mouth every 8 hours  -- Indication: For HTN (hypertension) I will START or STAY ON the medications listed below when I get home from the hospital:    acetaminophen 325 mg oral tablet  -- 2 tab(s) by mouth every 6 hours, As needed, For Temp greater than 38 C (100.4 F)  -- Indication: For FEVER    acetaminophen 325 mg oral tablet  -- 2 tab(s) by mouth every 6 hours, As needed, Mild Pain (1 - 3)  -- Indication: For PAIN    sodium bicarbonate 325 mg oral tablet  -- orally once a day  -- Indication: For ESRD (end stage renal disease) on dialysis    warfarin 3 mg oral tablet  -- 1 tab(s) by mouth once a day (in the evening HOLD FOR INR ABOVE 3.0 )  -- Indication: For TIA (transient ischemic attack)    insulin lispro 100 units/mL subcutaneous solution  --  subcutaneous 4 times a day (before meals and at bedtime); 1 Unit(s) if Glucose 151 - 200  2 Unit(s) if Glucose 201 - 250  3 Unit(s) if Glucose 251 - 300  4 Unit(s) if Glucose 301 - 350  5 Unit(s) if Glucose 351 - 400  6 Unit(s) if Glucose Greater Than 400  -- Indication: For DM    metoprolol tartrate 25 mg oral tablet  -- 1 tab(s) by mouth every 8 hours  -- Indication: For HTN (hypertension)    ipratropium-albuterol 0.5 mg-2.5 mg/3 mLinhalation solution  -- 3 milliliter(s) inhaled every 6 hours  -- Indication: For COPD    budesonide-formoterol 160 mcg-4.5 mcg/inh inhalation aerosol  -- inhaled 2 times a day  -- Indication: For COPD    Norvasc 5 mg oral tablet  -- 1 tab(s) by mouth once a day  -- Indication: For HTN (hypertension)    furosemide 40 mg oral tablet  -- 1 tab(s) by mouth once a day  -- Indication: For CHF (congestive heart failure)    Procrit 3000 units/mL injectable solution  -- injectable 3 times a week. dose per renal doctor.  -- Indication: For Anemia    ferrous sulfate 325 mg (65 mg elemental iron) oral delayed release tablet  -- 1 tab(s) by mouth once a day  -- Indication: For Anemia    melatonin 3 mg oral tablet  -- 1 tab(s) by mouth once (at bedtime)  -- Indication: For Insomnia    hydrALAZINE 25 mg oral tablet  -- 1 tab(s) by mouth every 8 hours  -- Indication: For HTN (hypertension) I will START or STAY ON the medications listed below when I get home from the hospital:    acetaminophen 325 mg oral tablet  -- 2 tab(s) by mouth every 6 hours, As needed, For Temp greater than 38 C (100.4 F)  -- Indication: For FEVER    acetaminophen 325 mg oral tablet  -- 2 tab(s) by mouth every 6 hours, As needed, Mild Pain (1 - 3)  -- Indication: For PAIN    warfarin 3 mg oral tablet  -- 1 tab(s) by mouth once a day (in the evening HOLD FOR INR ABOVE 3.0 )  -- Indication: For TIA (transient ischemic attack)    insulin lispro 100 units/mL subcutaneous solution  --  subcutaneous 4 times a day (before meals and at bedtime); 1 Unit(s) if Glucose 151 - 200  2 Unit(s) if Glucose 201 - 250  3 Unit(s) if Glucose 251 - 300  4 Unit(s) if Glucose 301 - 350  5 Unit(s) if Glucose 351 - 400  6 Unit(s) if Glucose Greater Than 400  -- Indication: For DM    metoprolol tartrate 25 mg oral tablet  -- 1 tab(s) by mouth every 8 hours  -- Indication: For HTN (hypertension)    ipratropium-albuterol 0.5 mg-2.5 mg/3 mLinhalation solution  -- 3 milliliter(s) inhaled every 6 hours  -- Indication: For COPD    budesonide-formoterol 160 mcg-4.5 mcg/inh inhalation aerosol  -- inhaled 2 times a day  -- Indication: For COPD    Norvasc 5 mg oral tablet  -- 1 tab(s) by mouth once a day  -- Indication: For HTN (hypertension)    furosemide 40 mg oral tablet  -- 1 tab(s) by mouth once a day  -- Indication: For CHF (congestive heart failure)    Procrit 3000 units/mL injectable solution  -- injectable 3 times a week. dose per renal doctor.  -- Indication: For Anemia    ferrous sulfate 325 mg (65 mg elemental iron) oral delayed release tablet  -- 1 tab(s) by mouth once a day  -- Indication: For Anemia    melatonin 3 mg oral tablet  -- 1 tab(s) by mouth once (at bedtime)  -- Indication: For Insomnia    hydrALAZINE 25 mg oral tablet  -- 1 tab(s) by mouth every 8 hours  -- Indication: For HTN (hypertension)

## 2018-06-24 NOTE — DISCHARGE NOTE ADULT - PATIENT PORTAL LINK FT
You can access the [facility] Patient Portal, offered by [facility], by registering with the following website: [email or web address]

## 2018-06-24 NOTE — PROGRESS NOTE ADULT - SUBJECTIVE AND OBJECTIVE BOX
Chief Complaint: Right foot gangrene    Interval Events: No events overnight. No complaints.    Review of Systems:  General: No fevers, chills, weight loss or gain  Skin: No rashes, color changes  Cardiovascular: No chest pain, orthopnea  Respiratory: No shortness of breath, cough  Gastrointestinal: No nausea, abdominal pain  Genitourinary: No incontinence, pain with urination  Musculoskeletal: No pain, swelling, decreased range of motion  Neurological: No headache, weakness  Psychiatric: No depression, anxiety  Endocrine: No weight loss or gain, increased thirst  All other systems are comprehensively negative.    Physical Exam:  Vital Signs Last 24 Hrs  T(C): 36.8 (24 Jun 2018 05:20), Max: 36.9 (23 Jun 2018 21:10)  T(F): 98.3 (24 Jun 2018 05:20), Max: 98.4 (23 Jun 2018 21:10)  HR: 115 (24 Jun 2018 05:20) (69 - 117)  BP: 145/76 (24 Jun 2018 05:20) (100/73 - 155/78)  BP(mean): --  RR: 17 (24 Jun 2018 05:20) (16 - 18)  SpO2: 94% (23 Jun 2018 21:10) (94% - 100%)  General: NAD  HEENT: MMM  Neck: No JVD, no carotid bruit  Lungs: CTAB  CV: Irregular, nl S1/S2, no M/R/G  Abdomen: S/NT/ND, +BS  Extremities: Left BKA, right AKA  Neuro: AAOx3, non-focal  Skin: No rash    Labs:             06-24    138  |  102  |  28<H>  ----------------------------<  198<H>  4.3   |  26  |  3.40<H>    Ca    7.7<L>      24 Jun 2018 06:58                          9.6    9.33  )-----------( 302      ( 24 Jun 2018 06:58 )             30.1

## 2018-06-24 NOTE — PROGRESS NOTE ADULT - SUBJECTIVE AND OBJECTIVE BOX
JP SANDOVAL  62y  MRN-[id number]    VASCULAR SUGERY/ DR. TORRES    POD # 4    Vital Signs Last 24 Hrs  T(C): 36.8 (24 Jun 2018 05:20), Max: 36.9 (23 Jun 2018 21:10)  T(F): 98.3 (24 Jun 2018 05:20), Max: 98.4 (23 Jun 2018 21:10)  HR: 115 (24 Jun 2018 05:20) (79 - 117)  BP: 145/76 (24 Jun 2018 05:20) (100/73 - 155/78)  BP(mean): --  RR: 17 (24 Jun 2018 10:04) (16 - 18)  SpO2: 97% (24 Jun 2018 10:04) (94% - 100%)     PHYSICAL EXAM    RIGHT AKA STUMP    WOUND DRY AND INTACT, NO BLEEDING/  DRAINAGE  STAPLES IN PLACE WITH GOOD APPROXIMATION  FLAPS ARE VIABLE , NO EDEMA/  ECCHYMOSIS     CBC Full  -  ( 24 Jun 2018 06:58 )  WBC Count : 9.33 K/uL  Hemoglobin : 9.6 g/dL  Hematocrit : 30.1 %  Platelet Count - Automated : 302 K/uL  Mean Cell Volume : 85.0 fl  Mean Cell Hemoglobin : 27.1 pg  Mean Cell Hemoglobin Concentration : 31.9 gm/dL  Auto Neutrophil # : x  Auto Lymphocyte # : x  Auto Monocyte # : x  Auto Eosinophil # : x  Auto Basophil # : x  Auto Neutrophil % : x  Auto Lymphocyte % : x  Auto Monocyte % : x  Auto Eosinophil % : x  Auto Basophil % : x    06-24    138  |  102  |  28<H>  ----------------------------<  198<H>  4.3   |  26  |  3.40<H>    Ca    7.7<L>      24 Jun 2018 06:58    A/P: S/P RIGHT AKA    CLEAR FROM VASCULAR SURGERY STAND POINT FOR DISCHARGE   STAPLES TO STAY IN PLACE FOR THE NEXT 3 WEEKS  XEROFORM  OVER THE STAPLES , LOOSE 4X4, KEMAL  AND ACE BANDAGE ON THE STUMP

## 2018-06-24 NOTE — DISCHARGE NOTE ADULT - HOSPITAL COURSE
Patient was  sent in to from [facility] hx of htn pvd dm ckd anemia resp failure cardiac arrest, tia cva on hd sent for a "blood clot in artery of r foot"per note from snf: r foot pain with discoloration of hallux 2/3/4 no trauma. decreased to no pulses of foot  arterial doppler: diffuse monophasic waveforms-can be associated with stenosis of the inflow at the level of iliacs  xray: no fx dated 6/17   pt stated sx began 2 days ago or so (18 Jun 2018 17:30) 62 year old male with a history of HTN, DM, ESRD on HD, alcohol abuse, PAD s/p left BKA, L leg prosthesis but mostly non-ambulatory cardiac arrest 11/2017, mild AS  prior left BKA amputation.admitted [facility] P  6/18 with pain R foot and was seen by [facility] surg  Arterial dopplers confirm multilevel occlusive disease of his right leg patient  RAKA was done 6/20 Patient was dyspneic on admission  CXR showed bl effsns poss airsapce opac Pt had leukocutyosis and a creat 4.5 Pulm consulted 6/18/2018 CXR findings were felt to be fluid overload and HD was continued Dyspnea improved  Pt was in AF and coumadin was started 6/23 11/30/2017 ECHO ef 65%   HOSPITAL COURSE   RESP   Resp status improved   Resp distress was likely sec to fluid ol Is getting HD  Monitor PO Target po 90-95%  INFECTION POSSIBLE UTI  Cefdinir started 6/22 for uti DR Dutton  CAD  .resume [facility] meds    AF ON  COUMADIN   Coumadin (6/23)   ESRD ON HD FOLLOWED  BY  ON EPOGEN FOR ANEMIA AS PER NEPHROLOGIST ORDER   HD   PVD S/P R AKA CLEARED FOR D/C BY [facility] SURGERY SERVICE   JAQUAN 6/20 done Dr Dang A/P: S/P RIGHT AKA  CLEAR FROM VASCULAR SURGERY STAND POINT FOR DISCHARGE   STAPLES TO STAY IN PLACE FOR THE NEXT 3 WEEKS  XEROFORM  OVER THE STAPLES , LOOSE 4X4, KEMAL  AND ACE BANDAGE ON THE STUMP

## 2018-06-24 NOTE — PROGRESS NOTE ADULT - SUBJECTIVE AND OBJECTIVE BOX
Patient is a 62y Male whom presented to the hospital with esrd on hd     pt seen and examined     PAST MEDICAL & SURGICAL HISTORY:  TIA (transient ischemic attack)  ESRD (end stage renal disease) on dialysis  ASHD (arteriosclerotic heart disease)  Cardiac arrest  CHF (congestive heart failure)  Anemia  HTN (hypertension)  Peripheral Arterial Disease  Controlled Type 2 Diabetes with Leg or Foot Ulcer  ETOH Abuse  Amputation, Below Knee, Unilateral, Traumatic      MEDICATIONS  (STANDING):  aspirin  chewable 81 milliGRAM(s) Oral daily  dextrose 5%. 1000 milliLiter(s) (50 mL/Hr) IV Continuous <Continuous>  dextrose 50% Injectable 12.5 Gram(s) IV Push once  dextrose 50% Injectable 25 Gram(s) IV Push once  dextrose 50% Injectable 25 Gram(s) IV Push once  famotidine    Tablet 20 milliGRAM(s) Oral daily  ferrous    sulfate 325 milliGRAM(s) Oral daily  heparin  Injectable 5000 Unit(s) SubCutaneous every 12 hours  hydrALAZINE 10 milliGRAM(s) Oral two times a day  insulin lispro (HumaLOG) corrective regimen sliding scale   SubCutaneous three times a day before meals  melatonin 3 milliGRAM(s) Oral at bedtime  metoprolol tartrate 50 milliGRAM(s) Oral every 8 hours        REVIEW OF SYSTEMS:    CONSTITUTIONAL: No weakness, fevers or chills  EYES/ENT:   no throat pain   NECK: No pain or stiffness  RESPIRATORY: no cough, wheezing, hemoptysis; pos  shortness of breath  CARDIOVASCULAR: No chest pain or palpitations  GASTROINTESTINAL: No abdominal or epigastric pain. No nausea, vomiting,     No diarrhea or constipation. No melena   SKIN: dry  , DISCOLORATION OF R  FOREFOOT  ,S/P L BKA                                                                                              9.6    9.33  )-----------( 302      ( 24 Jun 2018 06:58 )             30.1       CBC Full  -  ( 24 Jun 2018 06:58 )  WBC Count : 9.33 K/uL  Hemoglobin : 9.6 g/dL  Hematocrit : 30.1 %  Platelet Count - Automated : 302 K/uL  Mean Cell Volume : 85.0 fl  Mean Cell Hemoglobin : 27.1 pg  Mean Cell Hemoglobin Concentration : 31.9 gm/dL  Auto Neutrophil # : x  Auto Lymphocyte # : x  Auto Monocyte # : x  Auto Eosinophil # : x  Auto Basophil # : x  Auto Neutrophil % : x  Auto Lymphocyte % : x  Auto Monocyte % : x  Auto Eosinophil % : x  Auto Basophil % : x      06-24    138  |  102  |  28<H>  ----------------------------<  198<H>  4.3   |  26  |  3.40<H>    Ca    7.7<L>      24 Jun 2018 06:58        CAPILLARY BLOOD GLUCOSE      POCT Blood Glucose.: 210 mg/dL (24 Jun 2018 07:58)  POCT Blood Glucose.: 228 mg/dL (23 Jun 2018 22:24)  POCT Blood Glucose.: 252 mg/dL (23 Jun 2018 17:02)  POCT Blood Glucose.: 149 mg/dL (23 Jun 2018 11:43)      Vital Signs Last 24 Hrs  T(C): 36.8 (24 Jun 2018 05:20), Max: 36.9 (23 Jun 2018 21:10)  T(F): 98.3 (24 Jun 2018 05:20), Max: 98.4 (23 Jun 2018 21:10)  HR: 115 (24 Jun 2018 05:20) (79 - 117)  BP: 145/76 (24 Jun 2018 05:20) (100/73 - 155/78)  BP(mean): --  RR: 17 (24 Jun 2018 10:04) (16 - 18)  SpO2: 97% (24 Jun 2018 10:04) (94% - 100%)        PT/INR - ( 24 Jun 2018 06:58 )   PT: 14.4 sec;   INR: 1.31 ratio         PTT - ( 23 Jun 2018 10:18 )  PTT:29.5 sec                                     PHYSICAL EXAM:    Constitutional: NAD  HEENT: conjunctive   clear   Neck:  No JVD  Respiratory: CTAB  Cardiovascular: S1 and S2  Gastrointestinal: BS+, soft, NT/ND  Extremities: pos  peripheral edema  Neurological: , no focal deficits             RIGHT AKA STUMP

## 2018-06-24 NOTE — DISCHARGE NOTE ADULT - CARE PROVIDER_API CALL
Pahlavan, Mohsen (MD), Nephrology  [address] [place] [address]  Phone: [phone]  Fax: [phone]

## 2018-06-24 NOTE — DISCHARGE NOTE ADULT - PLAN OF CARE
s/p R AKA CLEARED BY SURG SERVICE FOR D/C ,CONTINUE WOUND CARE ,FOLLOWUP WITH VASC SX  AS OUTPATIENT continue current managmnet and medications HD AS PER  Recommended low salt,lowfat diet, continue current cardiac meds, f/,up with cardilogist,, BP control and monitoring, Echocardiogramm every 6-12 mnth to evaluate LV ef, monitor fluid status,electrolytes  and renal profile closely due to diuretics administartion. Weight control, notify physicain about weight gain 2lbs in 2-3 days serial cbc , followup with PCP BP monitoring,continue current antihypertensive meds, low salt diet,followup with PMD

## 2018-06-25 LAB
APTT BLD: 30.3 SEC — SIGNIFICANT CHANGE UP (ref 27.5–37.4)
INR BLD: 1.28 RATIO — HIGH (ref 0.88–1.16)
PROTHROM AB SERPL-ACNC: 14 SEC — HIGH (ref 9.8–12.7)

## 2018-06-25 RX ORDER — LANOLIN ALCOHOL/MO/W.PET/CERES
1 CREAM (GRAM) TOPICAL
Qty: 0 | Refills: 0 | COMMUNITY
Start: 2018-06-25

## 2018-06-25 RX ORDER — WARFARIN SODIUM 2.5 MG/1
1 TABLET ORAL
Qty: 0 | Refills: 0 | COMMUNITY
Start: 2018-06-25

## 2018-06-25 RX ORDER — ACETAMINOPHEN 500 MG
2 TABLET ORAL
Qty: 0 | Refills: 0 | COMMUNITY
Start: 2018-06-25

## 2018-06-25 RX ORDER — WARFARIN SODIUM 2.5 MG/1
3 TABLET ORAL ONCE
Qty: 0 | Refills: 0 | Status: COMPLETED | OUTPATIENT
Start: 2018-06-25 | End: 2018-06-25

## 2018-06-25 RX ORDER — SODIUM BICARBONATE 1 MEQ/ML
0 SYRINGE (ML) INTRAVENOUS
Qty: 0 | Refills: 0 | COMMUNITY

## 2018-06-25 RX ADMIN — Medication 50 MILLIGRAM(S): at 06:13

## 2018-06-25 RX ADMIN — Medication 10 MILLIGRAM(S): at 06:13

## 2018-06-25 RX ADMIN — FAMOTIDINE 20 MILLIGRAM(S): 10 INJECTION INTRAVENOUS at 12:10

## 2018-06-25 RX ADMIN — Medication 81 MILLIGRAM(S): at 12:10

## 2018-06-25 RX ADMIN — Medication 2: at 19:02

## 2018-06-25 RX ADMIN — Medication 2: at 12:09

## 2018-06-25 RX ADMIN — Medication 50 MILLIGRAM(S): at 22:03

## 2018-06-25 RX ADMIN — Medication 325 MILLIGRAM(S): at 12:10

## 2018-06-25 RX ADMIN — Medication 50 MILLIGRAM(S): at 13:44

## 2018-06-25 RX ADMIN — WARFARIN SODIUM 3 MILLIGRAM(S): 2.5 TABLET ORAL at 22:03

## 2018-06-25 RX ADMIN — HEPARIN SODIUM 5000 UNIT(S): 5000 INJECTION INTRAVENOUS; SUBCUTANEOUS at 19:00

## 2018-06-25 RX ADMIN — Medication 3 MILLIGRAM(S): at 22:03

## 2018-06-25 RX ADMIN — CEFDINIR 300 MILLIGRAM(S): 250 POWDER, FOR SUSPENSION ORAL at 12:09

## 2018-06-25 RX ADMIN — Medication 1: at 08:25

## 2018-06-25 RX ADMIN — Medication 10 MILLIGRAM(S): at 18:59

## 2018-06-25 RX ADMIN — HEPARIN SODIUM 5000 UNIT(S): 5000 INJECTION INTRAVENOUS; SUBCUTANEOUS at 06:12

## 2018-06-25 NOTE — PROGRESS NOTE ADULT - PROBLEM SELECTOR PLAN 5
continue current managmnet and medications

## 2018-06-25 NOTE — PROGRESS NOTE ADULT - SUBJECTIVE AND OBJECTIVE BOX
Patient was seen around 1 pm ,d/c summary prepared electronically and PCP from [facility] was notified about readmission Chart and available morning labs /imaging are reviewed electronically , urgent issues addressed . More information  is being added upon completion of rounds , when more information is collected and management discussed with consultants , medical staff and social service/case management on the floor Chief Complaint: Right foot gangrene  Interval Events: No events overnight. No complaints.  Review of Systems:  General: No fevers, chills, weight loss or gain  Skin: No rashes, color changes  Cardiovascular: No chest pain, orthopnea  Respiratory: No shortness of breath, cough  Gastrointestinal: No nausea, abdominal pain  Genitourinary: No incontinence, pain with urination  Musculoskeletal: No pain, swelling, decreased range of motion  Neurological: No headache, weakness  Psychiatric: No depression, anxiety  Endocrine: No weight loss or gain, increased thirst  All other systems are comprehensively negative.  Physical Exam:  vss afebrile   General: NAD  HEENT: MMM  Neck: No JVD, no carotid bruit  Lungs: CTAB  CV: Irregular, nl S1/S2, no M/R/G  Abdomen: S/NT/ND, +BS  Extremities: Left BKA, right AKA  Neuro: AAOx3, non-focal  Skin: No rash  Labs:           06-24  138  |  102  |  28<H>  ----------------------------<  198<H>  4.3   |  26  |  3.40<H>    Ca    7.7<L>      24 Jun 2018 06:58                          9.6    9.33  )-----------( 302      ( 24 Jun 2018 06:58 )             30.1   Labs and imaging reviewed electronically

## 2018-06-25 NOTE — PROGRESS NOTE ADULT - PROBLEM SELECTOR PLAN 6
continue home medications

## 2018-06-25 NOTE — PROGRESS NOTE ADULT - PROBLEM SELECTOR PLAN 8
Gastrointestina stress ulcer prophylaxis l and DVT prophylaxis administrated

## 2018-06-25 NOTE — PROGRESS NOTE ADULT - SUBJECTIVE AND OBJECTIVE BOX
Patient is a 62y Male whom presented to the hospital with esrd on hd     pt seen and examined     PAST MEDICAL & SURGICAL HISTORY:  TIA (transient ischemic attack)  ESRD (end stage renal disease) on dialysis  ASHD (arteriosclerotic heart disease)  Cardiac arrest  CHF (congestive heart failure)  Anemia  HTN (hypertension)  Peripheral Arterial Disease  Controlled Type 2 Diabetes with Leg or Foot Ulcer  ETOH Abuse  Amputation, Below Knee, Unilateral, Traumatic      MEDICATIONS  (STANDING):  aspirin  chewable 81 milliGRAM(s) Oral daily  dextrose 5%. 1000 milliLiter(s) (50 mL/Hr) IV Continuous <Continuous>  dextrose 50% Injectable 12.5 Gram(s) IV Push once  dextrose 50% Injectable 25 Gram(s) IV Push once  dextrose 50% Injectable 25 Gram(s) IV Push once  famotidine    Tablet 20 milliGRAM(s) Oral daily  ferrous    sulfate 325 milliGRAM(s) Oral daily  heparin  Injectable 5000 Unit(s) SubCutaneous every 12 hours  hydrALAZINE 10 milliGRAM(s) Oral two times a day  insulin lispro (HumaLOG) corrective regimen sliding scale   SubCutaneous three times a day before meals  melatonin 3 milliGRAM(s) Oral at bedtime  metoprolol tartrate 50 milliGRAM(s) Oral every 8 hours        REVIEW OF SYSTEMS:    CONSTITUTIONAL: No weakness, fevers or chills  EYES/ENT:   no throat pain   NECK: No pain or stiffness  RESPIRATORY: no cough, wheezing, hemoptysis; pos  shortness of breath  CARDIOVASCULAR: No chest pain or palpitations  GASTROINTESTINAL: No abdominal or epigastric pain. No nausea, vomiting,     No diarrhea or constipation. No melena   SKIN: dry  , DISCOLORATION OF R  FOREFOOT  ,S/P L BKA                                                                                                                   9.6    9.33  )-----------( 302      ( 24 Jun 2018 06:58 )             30.1       CBC Full  -  ( 24 Jun 2018 06:58 )  WBC Count : 9.33 K/uL  Hemoglobin : 9.6 g/dL  Hematocrit : 30.1 %  Platelet Count - Automated : 302 K/uL  Mean Cell Volume : 85.0 fl  Mean Cell Hemoglobin : 27.1 pg  Mean Cell Hemoglobin Concentration : 31.9 gm/dL  Auto Neutrophil # : x  Auto Lymphocyte # : x  Auto Monocyte # : x  Auto Eosinophil # : x  Auto Basophil # : x  Auto Neutrophil % : x  Auto Lymphocyte % : x  Auto Monocyte % : x  Auto Eosinophil % : x  Auto Basophil % : x      06-24    138  |  102  |  28<H>  ----------------------------<  198<H>  4.3   |  26  |  3.40<H>    Ca    7.7<L>      24 Jun 2018 06:58        CAPILLARY BLOOD GLUCOSE      POCT Blood Glucose.: 248 mg/dL (25 Jun 2018 18:53)  POCT Blood Glucose.: 238 mg/dL (25 Jun 2018 16:55)  POCT Blood Glucose.: 247 mg/dL (25 Jun 2018 11:56)  POCT Blood Glucose.: 180 mg/dL (25 Jun 2018 07:57)  POCT Blood Glucose.: 269 mg/dL (24 Jun 2018 21:42)      Vital Signs Last 24 Hrs  T(C): 36.7 (25 Jun 2018 13:41), Max: 36.7 (25 Jun 2018 13:41)  T(F): 98 (25 Jun 2018 13:41), Max: 98 (25 Jun 2018 13:41)  HR: 100 (25 Jun 2018 18:55) (71 - 100)  BP: 110/80 (25 Jun 2018 18:55) (110/80 - 146/75)  BP(mean): --  RR: 16 (25 Jun 2018 13:41) (16 - 16)  SpO2: 97% (25 Jun 2018 13:41) (96% - 97%)        PT/INR - ( 25 Jun 2018 07:35 )   PT: 14.0 sec;   INR: 1.28 ratio         PTT - ( 25 Jun 2018 07:35 )  PTT:30.3 sec                               PHYSICAL EXAM:    Constitutional: NAD  HEENT: conjunctive   clear   Neck:  No JVD  Respiratory: CTAB  Cardiovascular: S1 and S2  Gastrointestinal: BS+, soft, NT/ND  Extremities: pos  peripheral edema  Neurological: , no focal deficits             RIGHT AKA STUMP

## 2018-06-25 NOTE — PROGRESS NOTE ADULT - SUBJECTIVE AND OBJECTIVE BOX
Interval History:    CENTRAL LINE:   [  ] YES       [  ] NO  BARAJAS:                 [  ] YES       [  ] NO         REVIEW OF SYSTEMS:  All Systems below were reviewed and are negative [  ]  HEENT:  ID:  Pulmonary:  Cardiac:  GI:  Renal:  Musculoskeletal:  All other systems above were reviewed and are negative   [  ]      MEDICATIONS  (STANDING):  aspirin enteric coated 81 milliGRAM(s) Oral daily  dextrose 5%. 1000 milliLiter(s) (50 mL/Hr) IV Continuous <Continuous>  dextrose 50% Injectable 12.5 Gram(s) IV Push once  dextrose 50% Injectable 25 Gram(s) IV Push once  dextrose 50% Injectable 25 Gram(s) IV Push once  diltiazem    Tablet 30 milliGRAM(s) Oral every 8 hours  epoetin flower Injectable 21491 Unit(s) IV Push <User Schedule>  famotidine    Tablet 20 milliGRAM(s) Oral daily  ferrous    sulfate 325 milliGRAM(s) Oral daily  heparin  Injectable 5000 Unit(s) SubCutaneous every 12 hours  hydrALAZINE 10 milliGRAM(s) Oral two times a day  insulin lispro (HumaLOG) corrective regimen sliding scale   SubCutaneous three times a day before meals  insulin lispro (HumaLOG) corrective regimen sliding scale   SubCutaneous at bedtime  melatonin 3 milliGRAM(s) Oral at bedtime  metoprolol tartrate 50 milliGRAM(s) Oral every 8 hours  warfarin 3 milliGRAM(s) Oral once    MEDICATIONS  (PRN):  acetaminophen   Tablet 650 milliGRAM(s) Oral every 6 hours PRN For Temp greater than 38 C (100.4 F)  acetaminophen   Tablet. 650 milliGRAM(s) Oral every 6 hours PRN Mild Pain (1 - 3)  dextrose 40% Gel 15 Gram(s) Oral once PRN Blood Glucose LESS THAN 70 milliGRAM(s)/deciliter  glucagon  Injectable 1 milliGRAM(s) IntraMuscular once PRN Glucose LESS THAN 70 milligrams/deciliter  morphine  - Injectable 2 milliGRAM(s) IV Push every 4 hours PRN Severe Pain (7 - 10)  oxyCODONE    5 mG/acetaminophen 325 mG 2 Tablet(s) Oral every 4 hours PRN Moderate Pain (4 - 6)      Vital Signs Last 24 Hrs  T(C): 36.7 (25 Jun 2018 13:41), Max: 36.7 (25 Jun 2018 13:41)  T(F): 98 (25 Jun 2018 13:41), Max: 98 (25 Jun 2018 13:41)  HR: 100 (25 Jun 2018 18:55) (71 - 100)  BP: 110/80 (25 Jun 2018 18:55) (110/80 - 146/75)  BP(mean): --  RR: 16 (25 Jun 2018 13:41) (16 - 16)  SpO2: 97% (25 Jun 2018 13:41) (96% - 97%)    I&O's Summary      PHYSICAL EXAM:  HEENT: NC/AT; PERRLA  Neck: Soft; no tenderness  Lungs: CTA bilaterally; no wheezing.   Heart:  Abdomen:  Genital/ Rectal:  Extremities:  Neurologic:  Vascular:      LABORATORY:    CBC Full  -  ( 24 Jun 2018 06:58 )  WBC Count : 9.33 K/uL  Hemoglobin : 9.6 g/dL  Hematocrit : 30.1 %  Platelet Count - Automated : 302 K/uL  Mean Cell Volume : 85.0 fl  Mean Cell Hemoglobin : 27.1 pg  Mean Cell Hemoglobin Concentration : 31.9 gm/dL  Auto Neutrophil # : x  Auto Lymphocyte # : x  Auto Monocyte # : x  Auto Eosinophil # : x  Auto Basophil # : x  Auto Neutrophil % : x  Auto Lymphocyte % : x  Auto Monocyte % : x  Auto Eosinophil % : x  Auto Basophil % : x      ESR:                   06-18 @ 23:03  --    C-Reactive Protein:     06-18 @ 23:03  --    Procalcitonin:           06-18 @ 23:03   1.30      06-24    138  |  102  |  28<H>  ----------------------------<  198<H>  4.3   |  26  |  3.40<H>    Ca    7.7<L>      24 Jun 2018 06:58            Assessment and Plan:          Leo Schulz MD   [phone]. He is comfortable  No fevers noted.      MEDICATIONS  (STANDING):  aspirin enteric coated 81 milliGRAM(s) Oral daily  dextrose 5%. 1000 milliLiter(s) (50 mL/Hr) IV Continuous <Continuous>  dextrose 50% Injectable 12.5 Gram(s) IV Push once  dextrose 50% Injectable 25 Gram(s) IV Push once  dextrose 50% Injectable 25 Gram(s) IV Push once  diltiazem    Tablet 30 milliGRAM(s) Oral every 8 hours  epoetin flower Injectable 06139 Unit(s) IV Push <User Schedule>  famotidine    Tablet 20 milliGRAM(s) Oral daily  ferrous    sulfate 325 milliGRAM(s) Oral daily  heparin  Injectable 5000 Unit(s) SubCutaneous every 12 hours  hydrALAZINE 10 milliGRAM(s) Oral two times a day  insulin lispro (HumaLOG) corrective regimen sliding scale   SubCutaneous three times a day before meals  insulin lispro (HumaLOG) corrective regimen sliding scale   SubCutaneous at bedtime  melatonin 3 milliGRAM(s) Oral at bedtime  metoprolol tartrate 50 milliGRAM(s) Oral every 8 hours  warfarin 3 milliGRAM(s) Oral once    MEDICATIONS  (PRN):  acetaminophen   Tablet 650 milliGRAM(s) Oral every 6 hours PRN For Temp greater than 38 C (100.4 F)  acetaminophen   Tablet. 650 milliGRAM(s) Oral every 6 hours PRN Mild Pain (1 - 3)  dextrose 40% Gel 15 Gram(s) Oral once PRN Blood Glucose LESS THAN 70 milliGRAM(s)/deciliter  glucagon  Injectable 1 milliGRAM(s) IntraMuscular once PRN Glucose LESS THAN 70 milligrams/deciliter  morphine  - Injectable 2 milliGRAM(s) IV Push every 4 hours PRN Severe Pain (7 - 10)  oxyCODONE    5 mG/acetaminophen 325 mG 2 Tablet(s) Oral every 4 hours PRN Moderate Pain (4 - 6)      Vital Signs Last 24 Hrs  T(C): 36.7 (25 Jun 2018 13:41), Max: 36.7 (25 Jun 2018 13:41)  T(F): 98 (25 Jun 2018 13:41), Max: 98 (25 Jun 2018 13:41)  HR: 100 (25 Jun 2018 18:55) (71 - 100)  BP: 110/80 (25 Jun 2018 18:55) (110/80 - 146/75)  BP(mean): --  RR: 16 (25 Jun 2018 13:41) (16 - 16)  SpO2: 97% (25 Jun 2018 13:41) (96% - 97%)      PHYSICAL EXAM:  HEENT: NC/AT; PERRLA  Neck: Soft; no tenderness  Lungs: Coarse BS bilaterally; no wheezing.   Heart: RRR; no murmurs.  Abdomen: Soft; no masses.  Extremities: No ulcers.     LABORATORY:    CBC Full  -  ( 24 Jun 2018 06:58 )  WBC Count : 9.33 K/uL  Hemoglobin : 9.6 g/dL  Hematocrit : 30.1 %  Platelet Count - Automated : 302 K/uL  Mean Cell Volume : 85.0 fl  Mean Cell Hemoglobin : 27.1 pg  Mean Cell Hemoglobin Concentration : 31.9 gm/dL  Auto Neutrophil # : x  Auto Lymphocyte # : x  Auto Monocyte # : x  Auto Eosinophil # : x  Auto Basophil # : x  Auto Neutrophil % : x  Auto Lymphocyte % : x  Auto Monocyte % : x  Auto Eosinophil % : x  Auto Basophil % : x      138  |  102  |  28<H>  ----------------------------<  198<H>  4.3   |  26  |  3.40<H>    Ca    7.7<L>      24 Jun 2018 06:58      Assessment and Plan:    1. R foot with gangrene, s/p R AKA.   2. Severe PAD.  3. ESRD on HD  4. UTI.    . Completed course of antibiotics.  . Wound care daily.   . Discharge planning.         Leo Schulz MD   [phone].

## 2018-06-25 NOTE — CHART NOTE - NSCHARTNOTEFT_GEN_A_CORE
Assessment:   62 year old male with a history of HTN, DM, ESRD on HD, alcohol abuse, PAD s/p left BKA, cardiac arrest 11/2017, mild AS  admitted for gangrene of R foot resulting in R AKA 6/20.  Pt has been tolerating meals, drinking Glucerna shakes. No complaints offered.       Factors impacting intake: [x] none [ ] nausea  [ ] vomiting [ ] diarrhea [ ] constipation  [ ]chewing problems [ ] swallowing issues  [ ] other:     Diet Presciption: Diet, Consistent Carbohydrate Renal w/Evening Snack:   DASH/TLC {Sodium & Cholesterol Restricted}  Dysphagia 3, Soft, Thin Liquids ([id number])  Supplement Feeding Modality:  Oral  Glucerna Shake Cans or Servings Per Day:  1       Frequency:  Two Times a day (06-18-18 @ 20:37)    Intake: good, all bfst tray and Glucerna observed eaten.    Current Weight: no new    Pertinent Medications: MEDICATIONS  (STANDING):  aspirin enteric coated 81 milliGRAM(s) Oral daily  cefdinir 300 milliGRAM(s) Oral daily  dextrose 5%. 1000 milliLiter(s) (50 mL/Hr) IV Continuous <Continuous>  dextrose 50% Injectable 12.5 Gram(s) IV Push once  dextrose 50% Injectable 25 Gram(s) IV Push once  dextrose 50% Injectable 25 Gram(s) IV Push once  diltiazem    Tablet 30 milliGRAM(s) Oral every 8 hours  epoetin flower Injectable 85865 Unit(s) IV Push <User Schedule>  famotidine    Tablet 20 milliGRAM(s) Oral daily  ferrous    sulfate 325 milliGRAM(s) Oral daily  heparin  Injectable 5000 Unit(s) SubCutaneous every 12 hours  hydrALAZINE 10 milliGRAM(s) Oral two times a day  insulin lispro (HumaLOG) corrective regimen sliding scale   SubCutaneous three times a day before meals  insulin lispro (HumaLOG) corrective regimen sliding scale   SubCutaneous at bedtime  melatonin 3 milliGRAM(s) Oral at bedtime  metoprolol tartrate 50 milliGRAM(s) Oral every 8 hours  warfarin 3 milliGRAM(s) Oral once    MEDICATIONS  (PRN):  acetaminophen   Tablet 650 milliGRAM(s) Oral every 6 hours PRN For Temp greater than 38 C (100.4 F)  acetaminophen   Tablet. 650 milliGRAM(s) Oral every 6 hours PRN Mild Pain (1 - 3)  dextrose 40% Gel 15 Gram(s) Oral once PRN Blood Glucose LESS THAN 70 milliGRAM(s)/deciliter  glucagon  Injectable 1 milliGRAM(s) IntraMuscular once PRN Glucose LESS THAN 70 milligrams/deciliter  morphine  - Injectable 2 milliGRAM(s) IV Push every 4 hours PRN Severe Pain (7 - 10)  oxyCODONE    5 mG/acetaminophen 325 mG 2 Tablet(s) Oral every 4 hours PRN Moderate Pain (4 - 6)    Pertinent Labs: 06-24 Na138 mmol/L Glu 198 mg/dL<H> K+ 4.3 mmol/L Cr  3.40 mg/dL<H> BUN 28 mg/dL<H> 06-22 Phos 3.5 mg/dL 06-22 Alb 1.9 g/dL<L> 06-19 [id number] 7.4 %<H>     CAPILLARY BLOOD GLUCOSE      POCT Blood Glucose.: 180 mg/dL (25 Jun 2018 07:57)  POCT Blood Glucose.: 269 mg/dL (24 Jun 2018 21:42)  POCT Blood Glucose.: 217 mg/dL (24 Jun 2018 17:07)  POCT Blood Glucose.: 272 mg/dL (24 Jun 2018 12:41)    Skin: R AKA wound  Edema: none noted    Estimated Needs:   [x] no change since previous assessment  [ ] recalculated:     Previous Nutrition Diagnosis:     [x] Increased Nutrient Needs (protein)      Nutrition Diagnosis is [x] ongoing  [ ] resolved [ ] not applicable     New Nutrition Diagnosis: [x] not applicable       Interventions:   Recommend continue current diet and supplement as ordered.   [ ] Change Diet To:  [ ] Nutrition Supplement  [ ] Nutrition Support  [x] Other:  consider Nephrovite supplement daily    Monitoring and Evaluation:   [x] PO intake [ x ] Tolerance to diet prescription [ x ] weights [ x ] labs[ x ] follow up per protocol  [ ] other:

## 2018-06-25 NOTE — PROGRESS NOTE ADULT - NSHPATTENDINGPLANDISCUSS_GEN_ALL_CORE
med staff , Dr Aranda ,outpatient PCP , ,ARPIT ON 3 W
med staff , Dr Dang , Dr Aranda ,outpatient PCP ,  .Spoke to the hcp NINA ( she called me back )
med staff , Dr Dang , Dr Aranda ,outpatient PCP ,  .Spoke to the hcp NINA on a phone 06/19
med staff , Dr Aranda ,outpatient PCP ,
med staff , Dr Aranda ,outpatient PCP ,  .Spoke to the hcp NINA
med staff , Dr Aranda ,outpatient PCP ,  .Spoke to the hcp NINA
med staff , Dr Aranda ,outpatient PCP , ,HD nurse

## 2018-06-25 NOTE — PROGRESS NOTE ADULT - SUBJECTIVE AND OBJECTIVE BOX
Chief Complaint: Right foot gangrene    Interval Events: No events overnight. Sleeping.    Review of Systems:  General: No fevers, chills, weight loss or gain  Skin: No rashes, color changes  Cardiovascular: No chest pain, orthopnea  Respiratory: No shortness of breath, cough  Gastrointestinal: No nausea, abdominal pain  Genitourinary: No incontinence, pain with urination  Musculoskeletal: No pain, swelling, decreased range of motion  Neurological: No headache, weakness  Psychiatric: No depression, anxiety  Endocrine: No weight loss or gain, increased thirst  All other systems are comprehensively negative.    Physical Exam:  Vital Signs Last 24 Hrs  T(C): 36.6 (25 Jun 2018 04:58), Max: 37 (24 Jun 2018 13:28)  T(F): 97.9 (25 Jun 2018 04:58), Max: 98.6 (24 Jun 2018 13:28)  HR: 92 (25 Jun 2018 04:58) (49 - 93)  BP: 146/75 (25 Jun 2018 04:58) (120/77 - 146/75)  BP(mean): --  RR: 16 (25 Jun 2018 04:58) (16 - 17)  SpO2: 96% (25 Jun 2018 04:58) (94% - 98%)  General: NAD  HEENT: MMM  Neck: No JVD, no carotid bruit  Lungs: CTAB  CV: Irregular, nl S1/S2, no M/R/G  Abdomen: S/NT/ND, +BS  Extremities: Left BKA, right AKA  Neuro: AAOx3, non-focal  Skin: No rash    Labs:             06-24    138  |  102  |  28<H>  ----------------------------<  198<H>  4.3   |  26  |  3.40<H>    Ca    7.7<L>      24 Jun 2018 06:58                          9.6    9.33  )-----------( 302      ( 24 Jun 2018 06:58 )             30.1

## 2018-06-25 NOTE — PROGRESS NOTE ADULT - SUBJECTIVE AND OBJECTIVE BOX
VITALS/LABS  6/25/2018 afeb 92 146/75 16 98%   6/25/2018    6/24/2018 W 9.3 Hb 9.6 Plt 302  Na 138 K 4.3 CO2 25 Cr 3.4     PATIENT DESCRIPTION HOSPITAL COURSE  6/18/2018 ADMISSION [facility] P  62 year old male with a history of HTN, DM, ESRD on HD, alcohol abuse, PAD s/p left BKA, L leg prosthesis but mostly non-ambulatory cardiac arrest 11/2017, mild AS  prior left BKA amputation.admitted [facility] P  6/18 with pain R foot and was seen by Vas surg  Arterial dopplers confirm multilevel occlusive disease of his right leg patient  RAKA was done 6/20 Patient was dyspneic on admission  CXR showed bl effsns poss airsapce opac Pt had leukocutyosis and a creat 4.5 Pulm consulted 6/18/2018 CXR findings were felt to be fluid overload and HD was continued Dyspnea improved  Pt was in AF and coumadin was started 6/23 11/30/2017 ECHO ef 65%     ASSESSMENT RECOMMENDATIONS  6/18/2018 ADMISSION [facility] P  RESP   Resp status improved   Resp distress was likely sec to fluid ol Is getting HD  Monitor PO Target po 90-95%  INFECTION POSSIBLE UTI  Cefdinir started 6/22 for uti DR Dutton  CAD  On  ASA 81 (6/18)  metoporolol 50.3 (6/18) No ongoing ischemic   AF NEEDS DAILY DOSING   Coumadin (6/23)   ESRD  HD   PVD PAIN FOOT  On MS Percocet  RAKA 6/20 done Dr Dang    GLOBAL ISSUE/BEST PRACTICE:        PROBLEM: Analgesia:     na                        PROBLEM: Sedation:     na               PROBLEM: HOB elevation:   y            PROBLEM: Stress ulcer proph:    pepcid 20 (6/18)                       PROBLEM: VTE prophylaxis:      hsc (6/18)  Coumadin (6/23)                 PROBLEM: Glycemic control:    na  PROBLEM: Nutrition:    cons carb renal chamorro dys 3 soft thin glucerna 1.2 (6/18)           PROBLEM: Advanced directive: na     PROBLEM: Allergies:  na    TIME SPENT Over 25 minutes aggregate care time spent on encounter; activities included   direct patient care, counseling and/or coordinating care reviewing notes, lab data/ imaging , discussion with multidisciplinary team/ patient  /family

## 2018-06-26 VITALS
TEMPERATURE: 97 F | RESPIRATION RATE: 18 BRPM | OXYGEN SATURATION: 97 % | DIASTOLIC BLOOD PRESSURE: 93 MMHG | SYSTOLIC BLOOD PRESSURE: 147 MMHG | HEART RATE: 89 BPM

## 2018-06-26 LAB
ALBUMIN SERPL ELPH-MCNC: 1.8 G/DL — LOW (ref 3.3–5)
ALP SERPL-CCNC: 167 U/L — HIGH (ref 40–120)
ALT FLD-CCNC: 17 U/L — SIGNIFICANT CHANGE UP (ref 12–78)
ANION GAP SERPL CALC-SCNC: 11 MMOL/L — SIGNIFICANT CHANGE UP (ref 5–17)
AST SERPL-CCNC: 17 U/L — SIGNIFICANT CHANGE UP (ref 15–37)
BILIRUB SERPL-MCNC: 0.4 MG/DL — SIGNIFICANT CHANGE UP (ref 0.2–1.2)
BUN SERPL-MCNC: 51 MG/DL — HIGH (ref 7–23)
CALCIUM SERPL-MCNC: 7.4 MG/DL — LOW (ref 8.5–10.1)
CHLORIDE SERPL-SCNC: 99 MMOL/L — SIGNIFICANT CHANGE UP (ref 96–108)
CO2 SERPL-SCNC: 25 MMOL/L — SIGNIFICANT CHANGE UP (ref 22–31)
CREAT SERPL-MCNC: 5.5 MG/DL — HIGH (ref 0.5–1.3)
GLUCOSE SERPL-MCNC: 176 MG/DL — HIGH (ref 70–99)
HCT VFR BLD CALC: 30.9 % — LOW (ref 39–50)
HGB BLD-MCNC: 9.9 G/DL — LOW (ref 13–17)
INR BLD: 1.5 RATIO — HIGH (ref 0.88–1.16)
MCHC RBC-ENTMCNC: 26.8 PG — LOW (ref 27–34)
MCHC RBC-ENTMCNC: 32 GM/DL — SIGNIFICANT CHANGE UP (ref 32–36)
MCV RBC AUTO: 83.5 FL — SIGNIFICANT CHANGE UP (ref 80–100)
NRBC # BLD: 0 /100 WBCS — SIGNIFICANT CHANGE UP (ref 0–0)
PLATELET # BLD AUTO: 378 K/UL — SIGNIFICANT CHANGE UP (ref 150–400)
POTASSIUM SERPL-MCNC: 4.9 MMOL/L — SIGNIFICANT CHANGE UP (ref 3.5–5.3)
POTASSIUM SERPL-SCNC: 4.9 MMOL/L — SIGNIFICANT CHANGE UP (ref 3.5–5.3)
PROT SERPL-MCNC: 6.1 G/DL — SIGNIFICANT CHANGE UP (ref 6–8.3)
PROTHROM AB SERPL-ACNC: 16.5 SEC — HIGH (ref 9.8–12.7)
RBC # BLD: 3.7 M/UL — LOW (ref 4.2–5.8)
RBC # FLD: 17.2 % — HIGH (ref 10.3–14.5)
SODIUM SERPL-SCNC: 135 MMOL/L — SIGNIFICANT CHANGE UP (ref 135–145)
WBC # BLD: 8.94 K/UL — SIGNIFICANT CHANGE UP (ref 3.8–10.5)
WBC # FLD AUTO: 8.94 K/UL — SIGNIFICANT CHANGE UP (ref 3.8–10.5)

## 2018-06-26 PROCEDURE — 82803 BLOOD GASES ANY COMBINATION: CPT

## 2018-06-26 PROCEDURE — 83036 HEMOGLOBIN GLYCOSYLATED A1C: CPT

## 2018-06-26 PROCEDURE — 80202 ASSAY OF VANCOMYCIN: CPT

## 2018-06-26 PROCEDURE — 86704 HEP B CORE ANTIBODY TOTAL: CPT

## 2018-06-26 PROCEDURE — 73630 X-RAY EXAM OF FOOT: CPT

## 2018-06-26 PROCEDURE — 86850 RBC ANTIBODY SCREEN: CPT

## 2018-06-26 PROCEDURE — 99261: CPT

## 2018-06-26 PROCEDURE — 84145 PROCALCITONIN (PCT): CPT

## 2018-06-26 PROCEDURE — 85610 PROTHROMBIN TIME: CPT

## 2018-06-26 PROCEDURE — 84100 ASSAY OF PHOSPHORUS: CPT

## 2018-06-26 PROCEDURE — 85730 THROMBOPLASTIN TIME PARTIAL: CPT

## 2018-06-26 PROCEDURE — 86900 BLOOD TYPING SEROLOGIC ABO: CPT

## 2018-06-26 PROCEDURE — 71045 X-RAY EXAM CHEST 1 VIEW: CPT

## 2018-06-26 PROCEDURE — 86706 HEP B SURFACE ANTIBODY: CPT

## 2018-06-26 PROCEDURE — 87086 URINE CULTURE/COLONY COUNT: CPT

## 2018-06-26 PROCEDURE — 87340 HEPATITIS B SURFACE AG IA: CPT

## 2018-06-26 PROCEDURE — 87040 BLOOD CULTURE FOR BACTERIA: CPT

## 2018-06-26 PROCEDURE — 86901 BLOOD TYPING SEROLOGIC RH(D): CPT

## 2018-06-26 PROCEDURE — 80053 COMPREHEN METABOLIC PANEL: CPT

## 2018-06-26 PROCEDURE — 83735 ASSAY OF MAGNESIUM: CPT

## 2018-06-26 PROCEDURE — 81001 URINALYSIS AUTO W/SCOPE: CPT

## 2018-06-26 PROCEDURE — 82962 GLUCOSE BLOOD TEST: CPT

## 2018-06-26 PROCEDURE — 85027 COMPLETE CBC AUTOMATED: CPT

## 2018-06-26 PROCEDURE — 80048 BASIC METABOLIC PNL TOTAL CA: CPT

## 2018-06-26 PROCEDURE — 93005 ELECTROCARDIOGRAM TRACING: CPT

## 2018-06-26 PROCEDURE — 83605 ASSAY OF LACTIC ACID: CPT

## 2018-06-26 PROCEDURE — 99285 EMERGENCY DEPT VISIT HI MDM: CPT | Mod: 25

## 2018-06-26 RX ADMIN — Medication 50 MILLIGRAM(S): at 14:31

## 2018-06-26 RX ADMIN — Medication 10 MILLIGRAM(S): at 05:55

## 2018-06-26 RX ADMIN — Medication 1: at 08:04

## 2018-06-26 RX ADMIN — ERYTHROPOIETIN 10000 UNIT(S): 10000 INJECTION, SOLUTION INTRAVENOUS; SUBCUTANEOUS at 12:06

## 2018-06-26 RX ADMIN — Medication 50 MILLIGRAM(S): at 05:55

## 2018-06-26 RX ADMIN — HEPARIN SODIUM 5000 UNIT(S): 5000 INJECTION INTRAVENOUS; SUBCUTANEOUS at 05:55

## 2018-06-26 NOTE — PROGRESS NOTE ADULT - SUBJECTIVE AND OBJECTIVE BOX
Patient is a 62y Male whom presented to the hospital with esrd on hd     pt seen and examined     PAST MEDICAL & SURGICAL HISTORY:  TIA (transient ischemic attack)  ESRD (end stage renal disease) on dialysis  ASHD (arteriosclerotic heart disease)  Cardiac arrest  CHF (congestive heart failure)  Anemia  HTN (hypertension)  Peripheral Arterial Disease  Controlled Type 2 Diabetes with Leg or Foot Ulcer  ETOH Abuse  Amputation, Below Knee, Unilateral, Traumatic          REVIEW OF SYSTEMS:    CONSTITUTIONAL: No weakness, fevers or chills  EYES/ENT:   no throat pain   NECK: No pain or stiffness  RESPIRATORY: no cough, wheezing, hemoptysis; pos  shortness of breath  CARDIOVASCULAR: No chest pain or palpitations  GASTROINTESTINAL: No abdominal or epigastric pain. No nausea, vomiting,     No diarrhea or constipation. No melena   SKIN: dry  , DISCOLORATION OF R  FOREFOOT  ,S/P L BKA                                                            9.9    8.94  )-----------( 378      ( 26 Jun 2018 06:59 )             30.9       CBC Full  -  ( 26 Jun 2018 06:59 )  WBC Count : 8.94 K/uL  Hemoglobin : 9.9 g/dL  Hematocrit : 30.9 %  Platelet Count - Automated : 378 K/uL  Mean Cell Volume : 83.5 fl  Mean Cell Hemoglobin : 26.8 pg  Mean Cell Hemoglobin Concentration : 32.0 gm/dL  Auto Neutrophil # : x  Auto Lymphocyte # : x  Auto Monocyte # : x  Auto Eosinophil # : x  Auto Basophil # : x  Auto Neutrophil % : x  Auto Lymphocyte % : x  Auto Monocyte % : x  Auto Eosinophil % : x  Auto Basophil % : x      06-26    135  |  99  |  51<H>  ----------------------------<  176<H>  4.9   |  25  |  5.50<H>    Ca    7.4<L>      26 Jun 2018 06:59    TPro  6.1  /  Alb  1.8<L>  /  TBili  0.4  /  DBili  x   /  AST  17  /  ALT  17  /  AlkPhos  167<H>  06-26      CAPILLARY BLOOD GLUCOSE      POCT Blood Glucose.: 137 mg/dL (26 Jun 2018 11:49)  POCT Blood Glucose.: 186 mg/dL (26 Jun 2018 07:53)  POCT Blood Glucose.: 239 mg/dL (25 Jun 2018 21:44)  POCT Blood Glucose.: 248 mg/dL (25 Jun 2018 18:53)  POCT Blood Glucose.: 238 mg/dL (25 Jun 2018 16:55)      Vital Signs Last 24 Hrs  T(C): 36.3 (26 Jun 2018 14:30), Max: 36.4 (25 Jun 2018 21:33)  T(F): 97.4 (26 Jun 2018 14:30), Max: 97.6 (25 Jun 2018 21:33)  HR: 89 (26 Jun 2018 14:30) (72 - 100)  BP: 147/93 (26 Jun 2018 14:30) (110/80 - 147/93)  BP(mean): --  RR: 18 (26 Jun 2018 14:30) (17 - 20)  SpO2: 97% (26 Jun 2018 14:30) (95% - 97%)        PT/INR - ( 26 Jun 2018 06:59 )   PT: 16.5 sec;   INR: 1.50 ratio         PTT - ( 25 Jun 2018 07:35 )  PTT:30.3 sec                                                                            PHYSICAL EXAM:    Constitutional: NAD  HEENT: conjunctive   clear   Neck:  No JVD  Respiratory: CTAB  Cardiovascular: S1 and S2  Gastrointestinal: BS+, soft, NT/ND  Extremities: pos  peripheral edema  Neurological: , no focal deficits             RIGHT AKA STUMP

## 2018-06-26 NOTE — PROGRESS NOTE ADULT - PROBLEM SELECTOR PROBLEM 2
Peripheral Arterial Disease
CHF (congestive heart failure)
CHF (congestive heart failure)
Peripheral Arterial Disease
CHF (congestive heart failure)
Peripheral Arterial Disease

## 2018-06-26 NOTE — PROGRESS NOTE ADULT - PROBLEM SELECTOR PROBLEM 3
ESRD (end stage renal disease) on dialysis
ESRD (end stage renal disease) on dialysis
HTN (hypertension)
HTN (hypertension)
ESRD (end stage renal disease) on dialysis
HTN (hypertension)
ESRD (end stage renal disease) on dialysis

## 2018-06-26 NOTE — PROGRESS NOTE ADULT - PROBLEM SELECTOR PLAN 2
continue current managmnet and medications
continue current managmnet and medications
fluid removal
fluid removal
continue current managmnet and medications
fluid removal
continue current managmnet and medications

## 2018-06-26 NOTE — PROGRESS NOTE ADULT - SUBJECTIVE AND OBJECTIVE BOX
Chief Complaint: Right foot gangrene    Interval Events: No events overnight. No complaints.    Review of Systems:  General: No fevers, chills, weight loss or gain  Skin: No rashes, color changes  Cardiovascular: No chest pain, orthopnea  Respiratory: No shortness of breath, cough  Gastrointestinal: No nausea, abdominal pain  Genitourinary: No incontinence, pain with urination  Musculoskeletal: No pain, swelling, decreased range of motion  Neurological: No headache, weakness  Psychiatric: No depression, anxiety  Endocrine: No weight loss or gain, increased thirst  All other systems are comprehensively negative.    Physical Exam:  Vital Signs Last 24 Hrs  T(C): 36.4 (26 Jun 2018 04:55), Max: 36.7 (25 Jun 2018 13:41)  T(F): 97.6 (26 Jun 2018 04:55), Max: 98 (25 Jun 2018 13:41)  HR: 72 (26 Jun 2018 04:55) (71 - 100)  BP: 127/77 (26 Jun 2018 04:55) (110/80 - 127/77)  BP(mean): --  RR: 18 (26 Jun 2018 04:55) (16 - 18)  SpO2: 97% (26 Jun 2018 04:55) (96% - 97%)  General: NAD  HEENT: MMM  Neck: No JVD, no carotid bruit  Lungs: CTAB  CV: Irregular, nl S1/S2, no M/R/G  Abdomen: S/NT/ND, +BS  Extremities: Left BKA, right AKA  Neuro: AAOx3, non-focal  Skin: No rash    Labs:             06-26    135  |  99  |  51<H>  ----------------------------<  176<H>  4.9   |  25  |  5.50<H>    Ca    7.4<L>      26 Jun 2018 06:59    TPro  6.1  /  Alb  1.8<L>  /  TBili  0.4  /  DBili  x   /  AST  17  /  ALT  17  /  AlkPhos  167<H>  06-26                        9.9    8.94  )-----------( 378      ( 26 Jun 2018 06:59 )             30.9

## 2018-06-26 NOTE — PROGRESS NOTE ADULT - PROBLEM SELECTOR PLAN 1
pain management , vascular sx evaluation called Admitted for septic workup and evaluation,send blood and urine cx,serial lactate levels,monitor vitals martita grimes hydration,monitor urine output and renal profile,iv abx initiated -vancomycin given
continue with hemodialysis  as order   s/ p r AKA , continue current managmnet and medications.
continue with hemodialysis  as order   s/ p r AKA , continue current managmnet and medications.
pain management , vascular sx evaluation called Admitted for septic workup and evaluation,send blood and urine cx,serial lactate levels,monitor vitals martita grimes hydration,monitor urine output and renal profile,iv abx initiated -vancomycin given
continue with hemodialysis
continue with hemodialysis
continue with hemodialysis  as order   s/ p r AKA , continue current managmnet and medications.
s/ p r AKA , continue current managmnet and medications
s/ p R AKA , continue current managmnet and medications ,wound care as per vasc sx

## 2018-06-26 NOTE — PROGRESS NOTE ADULT - PROBLEM SELECTOR PROBLEM 4
ASHD (arteriosclerotic heart disease)
ASHD (arteriosclerotic heart disease)
Anemia in chronic kidney disease, on chronic dialysis
Anemia in chronic kidney disease, on chronic dialysis
ASHD (arteriosclerotic heart disease)
Anemia in chronic kidney disease, on chronic dialysis
ASHD (arteriosclerotic heart disease)

## 2018-06-26 NOTE — PROGRESS NOTE ADULT - ASSESSMENT
pt sent in to from [facility] hx of htn pvd dm ckd anemia resp failure cardiac arrest, tia cva on hd sent for a "blood clot in artery of r foot"	per note from snf:  of hallux 2/3/4 no trauma. decreased to no pulses of foot , 	with esrd on hd
pt sent in to from [facility] hx of htn pvd dm ckd anemia resp failure cardiac arrest, tia cva on hd sent for a "blood clot in artery of r foot"	per note from snf:  of hallux 2/3/4 no trauma. decreased to no pulses of foot , 	with esrd on hd
HPI:  pt sent in to from [facility] hx of htn pvd dm ckd anemia resp failure cardiac arrest, tia cva on hd sent for a "blood clot in artery of r foot"per note from snf: r foot pain with discoloration of hallux 2/3/4 no trauma. decreased to no pulses of foot  arterial doppler: diffuse monophasic waveforms-can be associated with stenosis of the inflow at the level of iliacs  xray: no fx dated 6/17  pt stated sx began 2 days ago or so (18 Jun 2018 17:30)
HPI:  pt sent in to from [facility] hx of htn pvd dm ckd anemia resp failure cardiac arrest, tia cva on hd sent for a "blood clot in artery of r foot"per note from snf: r foot pain with discoloration of hallux 2/3/4 no trauma. decreased to no pulses of foot  arterial doppler: diffuse monophasic waveforms-can be associated with stenosis of the inflow at the level of iliacs  xray: no fx dated 6/17  pt stated sx began 2 days ago or so (18 Jun 2018 17:30)
HPI:  pt sent in to from [facility] hx of htn pvd dm ckd anemia resp failure cardiac arrest, tia cva on hd sent for a "blood clot in artery of r foot"per note from snf: r foot pain with discoloration of hallux 2/3/4 no trauma. decreased to no pulses of foot  arterial doppler: diffuse monophasic waveforms-can be associated with stenosis of the inflow at the level of iliacs  xray: no fx dated 6/17  pt stated sx began 2 days ago or so (18 Jun 2018 17:30)
HPI:  pt sent in to from [facility] hx of htn pvd dm ckd anemia resp failure cardiac arrest, tia cva on hd sent for a "blood clot in artery of r foot"per note from snf: r foot pain with discoloration of hallux 2/3/4 no trauma. decreased to no pulses of foot  arterial doppler: diffuse monophasic waveforms-can be associated with stenosis of the inflow at the level of iliacs  xray: no fx dated 6/17  pt stated sx began 2 days ago or so (18 Jun 2018 17:30)
The patient is a 62 year old male with a history of HTN, DM, ESRD on HD, alcohol abuse, PAD s/p left BKA, cardiac arrest 11/2017, mild AS who presents for evaluation of right foot and concern for ischemic foot vs. gangrene.    Plan:  - Check ECG  - Continue hydralazine 10 mg bid  - Continue amlodipine 5 mg daily  - Continue metoprolol tartrate 50 mg q8h for rate control of AF  - If rates remain elevated, add diltiazem  - Will need warfarin for AF, to be started post-operatively when ok from a vascular standpoint  - Echocardiogram from 2017 with normal LV systolic function, mild AS  - CXR with pleural effusions and pulmonary edema - likely will need HD. Renal follow-up.  - The patient is at high risk for cardiac events. However, given the appearance of the foot, he will need urgent/emergent surgery and should proceed. Will need close post-operative monitoring, possibly in an ICU setting.
The patient is a 62 year old male with a history of HTN, DM, ESRD on HD, alcohol abuse, PAD s/p left BKA, cardiac arrest 11/2017, mild AS who presents for evaluation of right foot and concern for ischemic foot vs. gangrene.    Plan:  - Continue hydralazine 10 mg bid  - Continue metoprolol tartrate 50 mg q8h  - Continue diltiazem 30 mg q8h  - Echocardiogram from 2017 with normal LV systolic function, mild AS  - Continue warfarin with goal INR 2-3  - Discharge planning
The patient is a 62 year old male with a history of HTN, DM, ESRD on HD, alcohol abuse, PAD s/p left BKA, cardiac arrest 11/2017, mild AS who presents for evaluation of right foot and concern for ischemic foot vs. gangrene.    Plan:  - Continue hydralazine 10 mg bid  - Continue metoprolol tartrate 50 mg q8h  - Continue diltiazem 30 mg q8h  - Echocardiogram from 2017 with normal LV systolic function, mild AS  - Start warfarin 2.5 mg
The patient is a 62 year old male with a history of HTN, DM, ESRD on HD, alcohol abuse, PAD s/p left BKA, cardiac arrest 11/2017, mild AS who presents for evaluation of right foot and concern for ischemic foot vs. gangrene.    Plan:  - Continue hydralazine 10 mg bid  - Continue metoprolol tartrate 50 mg q8h  - Continue diltiazem 30 mg q8h  - Echocardiogram from 2017 with normal LV systolic function, mild AS  - Start warfarin for AF when ok from a surgical standpoint
The patient is a 62 year old male with a history of HTN, DM, ESRD on HD, alcohol abuse, PAD s/p left BKA, cardiac arrest 11/2017, mild AS who presents for evaluation of right foot and concern for ischemic foot vs. gangrene.    Plan:  - Continue hydralazine 10 mg bid  - Continue metoprolol tartrate 50 mg q8h  - Continue diltiazem 30 mg q8h  - Echocardiogram from 2017 with normal LV systolic function, mild AS  - Start warfarin for AF when ok from a surgical standpoint
The patient is a 62 year old male with a history of HTN, DM, ESRD on HD, alcohol abuse, PAD s/p left BKA, cardiac arrest 11/2017, mild AS who presents for evaluation of right foot and concern for ischemic foot vs. gangrene.    Plan:  - Continue hydralazine 10 mg bid  - Continue metoprolol tartrate 50 mg q8h for rate control of AF  - Discontinue amlodipine  - Add diltiazem 30 mg q8h  - HR elevated in part due to underlying gangrene  - Will need warfarin for AF, to be started post-operatively when ok from a vascular standpoint  - Echocardiogram from 2017 with normal LV systolic function, mild AS  - CXR with pleural effusions and pulmonary edema - likely will need HD. Renal follow-up.  - The patient is at high risk for cardiac events. However, given the appearance of the foot, he will need urgent/emergent surgery and should proceed. Will need close post-operative monitoring, possibly in an ICU setting.
pt sent in to from [facility] hx of htn pvd dm ckd anemia resp failure cardiac arrest, tia cva on hd sent for a "blood clot in artery of r foot"	per note from snf:  of hallux 2/3/4 no trauma. decreased to no pulses of foot , 	with esrd on hd
pt sent in to from [facility] hx of htn pvd dm ckd anemia resp failure cardiac arrest, tia cva on hd sent for a "blood clot in artery of r foot"	per note from snf:  of hallux 2/3/4 no trauma. decreased to no pulses of foot , 	with esrd on hd
pt sent in to from [facility] hx of htn pvd dm ckd anemia resp failure cardiac arrest, tia cva on hd sent for a "blood clot in artery of r foot"	per note from snf:  of hallux 2/3/4 no trauma. decreased to no pulses of foot , 	with esrd on hd
pt sent in to from [facility] hx of htn pvd dm ckd anemia resp failure cardiac arrest, tia cva on hd sent for a "blood clot in artery of r foot"	per note from snf:  of hallux 2/3/4 no trauma. decreased to no pulses of foot , 	with esrd on hd
pt sent in to from [facility] hx of htn pvd dm ckd anemia resp failure cardiac arrest, tia cva on hd sent for a "blood clot in artery of r foot"	per note from snf:  of hallux 2/3/4 no trauma. decreased to no pulses of foot , 	with esrd on hd
pt sent in to from [facility] hx of htn pvd dm ckd anemia resp failure cardiac arrest, tia cva on hd sent for a "blood clot in artery of r foot"	per note from snf:  of hallux 2/3/4 no trauma. decreased to no pulses of foot , 	with esrd on hd
HPI:  pt sent in to from [facility] hx of htn pvd dm ckd anemia resp failure cardiac arrest, tia cva on hd sent for a "blood clot in artery of r foot"per note from snf: r foot pain with discoloration of hallux 2/3/4 no trauma. decreased to no pulses of foot  arterial doppler: diffuse monophasic waveforms-can be associated with stenosis of the inflow at the level of iliacs  xray: no fx dated 6/17  pt stated sx began 2 days ago or so (18 Jun 2018 17:30)

## 2018-06-26 NOTE — PROGRESS NOTE ADULT - SUBJECTIVE AND OBJECTIVE BOX
Chart and available morning labs /imaging are reviewed electronically , urgent issues addressed . More information  is being added upon completion of rounds , when more information is collected and management discussed with consultants , medical staff and social service/case management on the floor

## 2018-06-26 NOTE — PROGRESS NOTE ADULT - PROBLEM SELECTOR PLAN 4
continue current managmnet and medications
continue current managmnet and medications
continue with epogen
continue with epogen
continue current managmnet and medications
continue with epogen
continue current managmnet and medications

## 2018-06-26 NOTE — PROGRESS NOTE ADULT - PROBLEM SELECTOR PROBLEM 1
Ischemic foot
ESRD (end stage renal disease) on dialysis
ESRD (end stage renal disease) on dialysis
Ischemic foot
ESRD (end stage renal disease) on dialysis
Ischemic foot

## 2018-06-26 NOTE — PROGRESS NOTE ADULT - SUBJECTIVE AND OBJECTIVE BOX
VITALS/LABS  6/26/2018 afeb 90 120/80 20 95% 49   6/26/2018 W 8.9 Hb 9.9 Plt 378  Na 135 K 4.9 CO2 25 Cr 5.5       PATIENT DESCRIPTION HOSPITAL COURSE  6/18/2018 ADMISSION [facility] P  62 year old male with a history of HTN, DM, ESRD on HD, alcohol abuse, PAD s/p left BKA, L leg prosthesis but mostly non-ambulatory cardiac arrest 11/2017, mild AS  prior left BKA amputation.admitted [facility] P  6/18 with pain R foot and was seen by Vasc surg  Arterial dopplers confirm multilevel occlusive disease of his right leg patient  RAKA was done 6/20 Patient was dyspneic on admission  CXR showed bl effsns poss airsapce opac Pt had leukocutyosis and a creat 4.5 Pulm consulted 6/18/2018 CXR findings were felt to be fluid overload and HD was continued Dyspnea improved ROULAKA 6/20 done Dr Dang  Pt was in AF and coumadin was started 6/23 11/30/2017 ECHO ef 65%     ASSESSMENT RECOMMENDATIONS  6/18/2018 ADMISSION [facility] P  RESP   Resp status improved   Resp distress was likely sec to fluid ol Is getting HD  Monitor PO Target po 90-95%  INFECTION POSSIBLE UTI  Cefdinir started 6/22 for uti DR Dutton  CAD  On  ASA 81 (6/18)  metoporolol 50.3 (6/18) No ongoing ischemic   AF NEEDS DAILY COUMADIN DOSING   Coumadin (6/23)   ESRD  HD   PVD PAIN FOOT  On MS Percocet  JAQUAN 6/20 done Dr Dang    GLOBAL ISSUE/BEST PRACTICE:        PROBLEM: Analgesia:     na                        PROBLEM: Sedation:     na               PROBLEM: HOB elevation:   y            PROBLEM: Stress ulcer proph:    pepcid 20 (6/18)                       PROBLEM: VTE prophylaxis:      hsc (6/18)  Coumadin (6/23)                 PROBLEM: Glycemic control:    na  PROBLEM: Nutrition:    cons carb renal chamorro dys 3 soft thin glucerna 1.2 (6/18)           PROBLEM: Advanced directive: na     PROBLEM: Allergies:  na    TIME SPENT Over 25 minutes aggregate care time spent on encounter; activities included   direct patient care, counseling and/or coordinating care reviewing notes, lab data/ imaging , discussion with multidisciplinary team/ patient  /family

## 2018-06-26 NOTE — PROGRESS NOTE ADULT - PROVIDER SPECIALTY LIST ADULT
Anesthesia
Cardiology
Infectious Disease
Internal Medicine
Nephrology
Pulmonology
Vascular Surgery
Infectious Disease
Nephrology
Nephrology
Internal Medicine
Internal Medicine

## 2018-06-26 NOTE — PROGRESS NOTE ADULT - PROBLEM SELECTOR PLAN 3
HD AS PER DR BEJARANO
BP monitoring,continue current antihypertensive meds, low salt diet,followup with PMD in 1-2 weeks
BP monitoring,continue current antihypertensive meds, low salt diet,followup with PMD in 1-2 weeks
HD AS PER DR BEJARANO
BP monitoring,continue current antihypertensive meds, low salt diet,
BP monitoring,continue current antihypertensive meds, low salt diet,followup with PMD in 1-2 weeks
HD AS PER DR BEJARANO

## 2018-06-27 LAB — SURGICAL PATHOLOGY FINAL REPORT - CH: SIGNIFICANT CHANGE UP

## 2018-07-10 ENCOUNTER — APPOINTMENT (OUTPATIENT)
Dept: SURGERY | Facility: CLINIC | Age: 63
End: 2018-07-10

## 2018-07-10 VITALS
WEIGHT: 107 LBS | DIASTOLIC BLOOD PRESSURE: 87 MMHG | SYSTOLIC BLOOD PRESSURE: 137 MMHG | HEIGHT: 65 IN | BODY MASS INDEX: 17.83 KG/M2 | OXYGEN SATURATION: 95 % | HEART RATE: 77 BPM

## 2018-11-20 NOTE — PROGRESS NOTE ADULT - ATTENDING COMMENTS
OOBTC/PT .Advance care planning was d/w the family.Pain is accessed and addresed.Pt was screened for signs of clinical depression .Appropriate referral made.Risk of falls accessed.Fall prevention d/w family .Pt is screened for tobacco and etoh,counseling was done for etoh and tobacco cessation.Use of narcotic pain meds was discussed.Pt is advised to use narcotic meds  wisely and to avoid overusing them.Plan of care d/w family and all questions answered to best of my knowledge DISCHARGE BACK TO [facility]  ,SOCIAL SERVICE INPUT D/W ARPIT ON [address]
MEDICALLY OPTIMIZED FOR OR AND CLEARED BY CARDIOLOGIST  .
MEDICALLY OPTIMIZED FOR OR AND CLEARED BY CARDIOLOGIST  .
OOBTC/PT .Advance care planning was d/w the family.Pain is accessed and addresed.Pt was screened for signs of clinical depression .Appropriate referral made.Risk of falls accessed.Fall prevention d/w family .Pt is screened for tobacco and etoh,counseling was done for etoh and tobacco cessation.Use of narcotic pain meds was discussed.Pt is advised to use narcotic meds  wisely and to avoid overusing them.Plan of care d/w family and all questions answered to best of my knowledge
OOBTC/PT .Advance care planning was d/w the family.Pain is accessed and addresed.Pt was screened for signs of clinical depression .Appropriate referral made.Risk of falls accessed.Fall prevention d/w family .Pt is screened for tobacco and etoh,counseling was done for etoh and tobacco cessation.Use of narcotic pain meds was discussed.Pt is advised to use narcotic meds  wisely and to avoid overusing them.Plan of care d/w family and all questions answered to best of my knowledge DISCHARGE BACK TO [facility] IN AM ,SOCIAL SERVICE INPUT
Declined

## 2019-01-28 NOTE — CONSULT NOTE ADULT - NSHPATTENDINGPLANDISCUSS_GEN_ALL_CORE
Per mother- [place] has been running high in the upper 180s to 300s. Ketones negative to trace. Mother is afraid is GI strep is back. Last time he had strep it required 3 rounds of antibiotics.    Per Dr. Davidson make changes as below. Mother verbalized understanding.    Basaglar: 21____change to 22    CHO: 1 unit per 30 grams of CHO______Change to 25    Target B  CF: 150_____Change to 120   Dr. Gomes

## 2020-01-30 NOTE — PRE-OP CHECKLIST - IDENTIFICATION BAND VERIFIED
Aure Joy comes into clinic today at the request of ESTELA Zamudio MD Ordering Provider for Medication Management:  Spravato Admin .    Medication checked by: Edmond WrenD  Prior to administration pt identified by name and : yes    Appearance: dressed casually   Mood: depressed   Affect: flat  Eye contact: good  Side effects: dissociatoin  Level of cooperation: cooperative  Education: none needed  Next appt: next week     I spent an additional 120 minutes today, in direct patient contact monitoring the patient after Spravato administration. Patient had the following issues no change in depression, vitals monitored through entire visit.    Medication provided by Pharmacy      This service provided today was under the supervising provider of the day ESTELA Zamudio MD, who was available if needed.    BEN CORDOVA, RN   done

## 2021-03-04 NOTE — CONSULT NOTE ADULT - SUBJECTIVE AND OBJECTIVE BOX
----- Message from Georgi Lo MD sent at 3/4/2021  2:17 PM CST -----  Please notify pt that her MRI and and recent testing did not show any acute issues, we will discuss all the results further at her upcoming appointment
Patient is a 62y Male whom presented to the hospital with     PAST MEDICAL & SURGICAL HISTORY:  TIA (transient ischemic attack)  ESRD (end stage renal disease) on dialysis  ASHD (arteriosclerotic heart disease)  Cardiac arrest  CHF (congestive heart failure)  Anemia  HTN (hypertension)  Peripheral Arterial Disease  Controlled Type 2 Diabetes with Leg or Foot Ulcer  ETOH Abuse  Amputation, Below Knee, Unilateral, Traumatic      MEDICATIONS  (STANDING):  aspirin  chewable 81 milliGRAM(s) Oral daily  dextrose 5%. 1000 milliLiter(s) (50 mL/Hr) IV Continuous <Continuous>  dextrose 50% Injectable 12.5 Gram(s) IV Push once  dextrose 50% Injectable 25 Gram(s) IV Push once  dextrose 50% Injectable 25 Gram(s) IV Push once  famotidine    Tablet 20 milliGRAM(s) Oral daily  ferrous    sulfate 325 milliGRAM(s) Oral daily  heparin  Injectable 5000 Unit(s) SubCutaneous every 12 hours  hydrALAZINE 10 milliGRAM(s) Oral two times a day  insulin lispro (HumaLOG) corrective regimen sliding scale   SubCutaneous three times a day before meals  melatonin 3 milliGRAM(s) Oral at bedtime  metoprolol tartrate 50 milliGRAM(s) Oral every 8 hours      Allergies    No Known Allergies    Intolerances        SOCIAL HISTORY:  Denies ETOh,Smoking,     FAMILY HISTORY:  No pertinent family history in first degree relatives      REVIEW OF SYSTEMS:    CONSTITUTIONAL: No weakness, fevers or chills  EYES/ENT:   no throat pain   NECK: No pain or stiffness  RESPIRATORY: no cough, wheezing, hemoptysis; pos  shortness of breath  CARDIOVASCULAR: No chest pain or palpitations  GASTROINTESTINAL: No abdominal or epigastric pain. No nausea, vomiting,     No diarrhea or constipation. No melena   SKIN: dry  , DISCOLORATION OF R  FOREFOOT  ,S/P L BKA    VITAL:  T(C): , Max: 37.2 (06-18-18 @ 16:08)  T(F): , Max: 98.9 (06-18-18 @ 16:08)  HR: 84 (06-18-18 @ 19:02)  BP: 141/67 (06-18-18 @ 19:02)  BP(mean): --  RR: 16 (06-18-18 @ 19:02)  SpO2: 97% (06-18-18 @ 19:02)  Wt(kg): --    I and O's:      Weight (kg): 54.9 (06-18 @ 16:08)    PHYSICAL EXAM:    Constitutional: NAD  HEENT: conjunctive   clear   Neck:  No JVD  Respiratory: CTAB  Cardiovascular: S1 and S2  Gastrointestinal: BS+, soft, NT/ND  Extremities: pos  peripheral edema  Neurological: , no focal deficits  Psychiatric: Normal mood, normal affect  : No Bauer  Skin: dry ,  DISCOLORATION OF R  FOREFOOT  ,S/P L BKALABS:                        9.3    15.83 )-----------( 329      ( 18 Jun 2018 16:56 )             29.4     06-18    132<L>  |  92<L>  |  51<H>  ----------------------------<  426<H>  3.6   |  28  |  4.50<H>    Ca    8.2<L>      18 Jun 2018 16:56    TPro  7.2  /  Alb  2.4<L>  /  TBili  0.4  /  DBili  x   /  AST  32  /  ALT  24  /  AlkPhos  263<H>  06-18      Urine Studies:          RADIOLOGY & ADDITIONAL STUDIES:
pl see assessment
History of Present Illness:  62y Male with a history of diabetes and multiple medical issues presents with gangrene and pain of right foot for several days. The patient had a prior left BKA amputation.. He has a prosthesis for his left leg but is essentially nonambulatory. Arterial dopplers confirm multilevel occlusive disease of his right leg. I was requested to evaluate his LE circulation.    PAST MEDICAL & SURGICAL HISTORY:  TIA (transient ischemic attack)  ESRD (end stage renal disease) on dialysis  ASHD (arteriosclerotic heart disease)  Cardiac arrest  CHF (congestive heart failure)  Anemia  HTN (hypertension)  Peripheral Arterial Disease  Controlled Type 2 Diabetes with Leg or Foot Ulcer  ETOH Abuse  Amputation, Below Knee, Unilateral, Traumatic      Allergies    No Known Allergies    Intolerances        MEDICATIONS  (STANDING):  aspirin  chewable 81 milliGRAM(s) Oral daily  dextrose 5%. 1000 milliLiter(s) (50 mL/Hr) IV Continuous <Continuous>  dextrose 50% Injectable 12.5 Gram(s) IV Push once  dextrose 50% Injectable 25 Gram(s) IV Push once  dextrose 50% Injectable 25 Gram(s) IV Push once  famotidine    Tablet 20 milliGRAM(s) Oral daily  ferrous    sulfate 325 milliGRAM(s) Oral daily  hydrALAZINE 10 milliGRAM(s) Oral two times a day  insulin lispro (HumaLOG) corrective regimen sliding scale   SubCutaneous three times a day before meals  melatonin 3 milliGRAM(s) Oral at bedtime  metoprolol tartrate 50 milliGRAM(s) Oral every 8 hours  sodium chloride 0.45%. 1000 milliLiter(s) (30 mL/Hr) IV Continuous <Continuous>    MEDICATIONS  (PRN):  acetaminophen   Tablet 650 milliGRAM(s) Oral every 6 hours PRN For Temp greater than 38 C (100.4 F)  acetaminophen   Tablet. 650 milliGRAM(s) Oral every 6 hours PRN Mild Pain (1 - 3)  dextrose 40% Gel 15 Gram(s) Oral once PRN Blood Glucose LESS THAN 70 milliGRAM(s)/deciliter  glucagon  Injectable 1 milliGRAM(s) IntraMuscular once PRN Glucose LESS THAN 70 milligrams/deciliter  morphine  - Injectable 2 milliGRAM(s) IV Push every 4 hours PRN Severe Pain (7 - 10)  oxyCODONE    5 mG/acetaminophen 325 mG 2 Tablet(s) Oral every 4 hours PRN Moderate Pain (4 - 6)      Social History:  Smoking History: Past hx.  Alcohol Use: ETOH ABUSE    REVIEW OF SYSTEMS:  CONSTITUTIONAL: No weakness, fevers or chills  EYES/ENT: No visual changes;  No vertigo or throat pain   NECK: No pain or stiffness  RESPIRATORY: No cough, wheezing, hemoptysis; No shortness of breath  CARDIOVASCULAR: No chest pain or palpitations  GASTROINTESTINAL: No abdominal or epigastric pain. No nausea, vomiting, or hematemesis; No diarrhea or constipation. No melena or hematochezia.  GENITOURINARY: No dysuria, frequency or hematuria  NEUROLOGICAL: No numbness or weakness  SKIN: No itching, burning, rashes, or lesions   Vascular:  +++ SEVERE PAIN AND GANGRENE OF RIGHT FOOT  All other review of systems is negative unless indicated above.    PHYSICAL EXAM:  General:  On exam, the patient is alert nontoxic appearing Male in no acute distress.  Vital Signs Last 24 Hrs  T(C): 37.2 (18 Jun 2018 16:08), Max: 37.2 (18 Jun 2018 16:08)  T(F): 98.9 (18 Jun 2018 16:08), Max: 98.9 (18 Jun 2018 16:08)  HR: 84 (18 Jun 2018 19:02) (84 - 104)  BP: 141/67 (18 Jun 2018 19:02) (141/67 - 142/87)  BP(mean): --  RR: 16 (18 Jun 2018 19:02) (16 - 18)  SpO2: 97% (18 Jun 2018 19:02) (95% - 97%)    Neck:  4+/4+ bilateral carotid pulses; no carotid bruit, no palpable cervical masses.  Heart:  Regular, no murmurs, rubs or gallops.    Lungs:  Clear to auscultation.    Chest:  No chest wall deformities  Symmetrical chest expansion.   Abdomen: Soft and nontender.  No palpable masses.  No rebound, guarding or rigidity.  Extremities:  Right foot is markedly tender with gangrene of right forefoot and a bullae on dorsum of right foot.  The right foot is not salvageable. There are no palpable cords or limb cellulitis.  There is a well healed left BKA stump.   On examination of the peripheral pulses:  Left leg femoral pulse is 4/4   , popliteal pulse is  3/4 .  Right leg femoral pulse is 4/4   ,popliteal pulse is 1/4   , PT Pulse is 0, DP Pulse is 0  Neurological:  There is decreased sensation in the right foot.                          9.3    15.83 )-----------( 329      ( 18 Jun 2018 16:56 )             29.4     06-18    132<L>  |  92<L>  |  51<H>  ----------------------------<  426<H>  3.6   |  28  |  4.50<H>    Ca    8.2<L>      18 Jun 2018 16:56    TPro  7.2  /  Alb  2.4<L>  /  TBili  0.4  /  DBili  x   /  AST  32  /  ALT  24  /  AlkPhos  263<H>  06-18        PT/INR - ( 18 Jun 2018 16:56 )   PT: 15.7 sec;   INR: 1.43 ratio         PTT - ( 18 Jun 2018 16:56 )  PTT:33.2 sec    Radiology:
History of Present Illness: The patient is a 62 year old male with a history of HTN, DM, ESRD on HD, alcohol abuse, PAD s/p left BKA, cardiac arrest 11/2017, mild AS who presents for evaluation of right foot and concern for ischemic foot. The patient is a poor historian. He denies chest pain, shortness of breath. There is right foot pain. He states the foot has had issues for "a while." An arterial duplex was done of the right leg noting monophasic waveforms, "may be associated with stenosis of the inflow of the iliac arteries."    Past Medical/Surgical History:  HTN, DM, ESRD on HD, alcohol abuse, PAD s/p left BKA, cardiac arrest 11/2017, mild AS    Medications:  Home Medications:  budesonide-formoterol 160 mcg-4.5 mcg/inh inhalation aerosol:  inhaled  (08 Dec 2017 12:52)  ferrous sulfate 325 mg (65 mg elemental iron) oral delayed release tablet: 1 tab(s) orally once a day (30 Nov 2017 07:23)  furosemide 40 mg oral tablet: 1 tab(s) orally once a day (08 Dec 2017 12:57)  hydrALAZINE 25 mg oral tablet: 1 tab(s) orally every 8 hours (08 Dec 2017 12:52)  insulin lispro 100 units/mL subcutaneous solution:  subcutaneous 4 times a day (before meals and at bedtime); 1 Unit(s) if Glucose 151 - 200  2 Unit(s) if Glucose 201 - 250  3 Unit(s) if Glucose 251 - 300  4 Unit(s) if Glucose 301 - 350  5 Unit(s) if Glucose 351 - 400  6 Unit(s) if Glucose Greater Than 400 (08 Dec 2017 12:52)  ipratropium-albuterol 0.5 mg-2.5 mg/3 mLinhalation solution: 3 milliliter(s) inhaled every 6 hours (08 Dec 2017 12:52)  metoprolol tartrate 25 mg oral tablet: 1 tab(s) orally every 8 hours (08 Dec 2017 12:52)  Norvasc 5 mg oral tablet: 1 tab(s) orally once a day (30 Nov 2017 07:23)  Procrit 3000 units/mL injectable solution: injectable 3 times a week. dose per renal doctor. (08 Dec 2017 12:52)      Family History: Non-contributory family history of premature cardiovascular atherosclerotic disease    Social History: No tobacco, alcohol or drug use    Review of Systems:  General: No fevers, chills, weight loss or gain  Skin: No rashes, color changes  Cardiovascular: No chest pain, orthopnea  Respiratory: No shortness of breath, cough  Gastrointestinal: No nausea, abdominal pain  Genitourinary: No incontinence, pain with urination  Musculoskeletal: (+) pain, swelling, decreased range of motion  Neurological: No headache, weakness  Psychiatric: No depression, anxiety  Endocrine: No weight loss or gain, increased thirst  All other systems are comprehensively negative.    Physical Exam:  Vitals:        Vital Signs Last 24 Hrs  T(C): 37.2 (18 Jun 2018 16:08), Max: 37.2 (18 Jun 2018 16:08)  T(F): 98.9 (18 Jun 2018 16:08), Max: 98.9 (18 Jun 2018 16:08)  HR: 104 (18 Jun 2018 16:08) (104 - 104)  BP: 142/87 (18 Jun 2018 16:08) (142/87 - 142/87)  BP(mean): --  RR: 18 (18 Jun 2018 16:08) (18 - 18)  SpO2: 95% (18 Jun 2018 16:08) (95% - 95%)  General: NAD  HEENT: MMM  Neck: No JVD, no carotid bruit  Lungs: CTAB  CV: RRR, nl S1/S2, 3/6 systolic murmur  Abdomen: S/NT/ND, +BS  Extremities: Right foot erythema, purple coloration, tender, blister  Neuro: AAOx3, non-focal  Skin: No rash    Labs:                        9.3    15.83 )-----------( 329      ( 18 Jun 2018 16:56 )             29.4                   ECG: Pending
Patient is a 62y old  Male who presents with a chief complaint of sent from [facility] for increasing pain in right foot, cold, pulseless (19 Jun 2018 01:12)      The patient is a 62 year old male with a history of PAD with left BKA, ESRD on HD, CHF, DM, HTN, TIA and alcohol abuse sent in to from [facility] rehab for a persistent pain in his R foot for 2 days. In the ED he was noted to have gangrene of the R foot and has been seen by vascular surgery. He was given Vancomycin 1 gm iv in the ED. The patient is afebrile in the ED.   	    PAST MEDICAL & SURGICAL HISTORY:  TIA (transient ischemic attack)  ESRD (end stage renal disease) on dialysis  ASHD (arteriosclerotic heart disease)  Cardiac arrest  CHF (congestive heart failure)  Anemia  HTN (hypertension)  Peripheral Arterial Disease  Controlled Type 2 Diabetes with Leg or Foot Ulcer  ETOH Abuse  Amputation, Below Knee, Unilateral, Traumatic      Allergies    No Known Allergies    Intolerances    REVIEW OF SYSTEMS:    No cough or SOB  No abdominal pain or diarrhea.     HOME MEDICATIONS:    MEDICATIONS  (STANDING):  amLODIPine   Tablet 5 milliGRAM(s) Oral daily  dextrose 5%. 1000 milliLiter(s) (50 mL/Hr) IV Continuous <Continuous>  dextrose 50% Injectable 12.5 Gram(s) IV Push once  dextrose 50% Injectable 25 Gram(s) IV Push once  dextrose 50% Injectable 25 Gram(s) IV Push once  famotidine    Tablet 20 milliGRAM(s) Oral daily  ferrous    sulfate 325 milliGRAM(s) Oral daily  hydrALAZINE 10 milliGRAM(s) Oral two times a day  insulin lispro (HumaLOG) corrective regimen sliding scale   SubCutaneous three times a day before meals  melatonin 3 milliGRAM(s) Oral at bedtime  metoprolol tartrate 50 milliGRAM(s) Oral every 8 hours    MEDICATIONS  (PRN):  acetaminophen   Tablet 650 milliGRAM(s) Oral every 6 hours PRN For Temp greater than 38 C (100.4 F)  acetaminophen   Tablet. 650 milliGRAM(s) Oral every 6 hours PRN Mild Pain (1 - 3)  dextrose 40% Gel 15 Gram(s) Oral once PRN Blood Glucose LESS THAN 70 milliGRAM(s)/deciliter  glucagon  Injectable 1 milliGRAM(s) IntraMuscular once PRN Glucose LESS THAN 70 milligrams/deciliter  morphine  - Injectable 2 milliGRAM(s) IV Push every 4 hours PRN Severe Pain (7 - 10)  oxyCODONE    5 mG/acetaminophen 325 mG 2 Tablet(s) Oral every 4 hours PRN Moderate Pain (4 - 6)      Vital Signs Last 24 Hrs  T(C): 37.1 (19 Jun 2018 05:32), Max: 37.2 (18 Jun 2018 16:08)  T(F): 98.8 (19 Jun 2018 05:32), Max: 98.9 (18 Jun 2018 16:08)  HR: 106 (19 Jun 2018 05:32) (84 - 115)  BP: 158/109 (19 Jun 2018 05:32) (129/86 - 158/109)  BP(mean): --  RR: 18 (19 Jun 2018 05:32) (15 - 18)  SpO2: 91% (19 Jun 2018 05:32) (91% - 97%)    PHYSICAL EXAM:  HEENT: NC/AT  Neck: Soft  Lungs: CTA bilaterally; no wheezing.   Heart: RRR; no murmurs.  Abdomen: Soft; no masses.   Extremities: R foot with gangrene and blisters. Tender with palpation.   Neurologic: Awake.    I&O's Summary      LABORATORY:                          8.8    16.74 )-----------( 310      ( 19 Jun 2018 06:36 )             26.5       ESR:                   06-18 @ 23:03  --    C-Reactive Protein:     06-18 @ 23:03  --    Procalcitonin:           06-18 @ 23:03   1.30      06-19    135  |  95<L>  |  56<H>  ----------------------------<  209<H>  4.0   |  28  |  4.90<H>    Ca    8.0<L>      19 Jun 2018 06:36  Phos  2.6     06-19  Mg     2.3     06-19    TPro  6.5  /  Alb  2.2<L>  /  TBili  0.5  /  DBili  x   /  AST  24  /  ALT  23  /  AlkPhos  222<H>  06-19          LABORATORY:    CBC Full  -  ( 19 Jun 2018 06:36 )  WBC Count : 16.74 K/uL  Hemoglobin : 8.8 g/dL  Hematocrit : 26.5 %  Platelet Count - Automated : 310 K/uL  Mean Cell Volume : 83.9 fl  Mean Cell Hemoglobin : 27.8 pg  Mean Cell Hemoglobin Concentration : 33.2 gm/dL  Auto Neutrophil # : 15.46 K/uL  Auto Lymphocyte # : 0.54 K/uL  Auto Monocyte # : 0.54 K/uL  Auto Eosinophil # : 0.06 K/uL  Auto Basophil # : 0.05 K/uL  Auto Neutrophil % : 92.4 %  Auto Lymphocyte % : 3.2 %  Auto Monocyte % : 3.2 %  Auto Eosinophil % : 0.4 %  Auto Basophil % : 0.3 %        ESR:                   06-18 @ 23:03  --    C-Reactive Protein:     06-18 @ 23:03  --    Procalcitonin:           06-18 @ 23:03   1.30      06-19    135  |  95<L>  |  56<H>  ----------------------------<  209<H>  4.0   |  28  |  4.90<H>    Ca    8.0<L>      19 Jun 2018 06:36  Phos  2.6     06-19  Mg     2.3     06-19    TPro  6.5  /  Alb  2.2<L>  /  TBili  0.5  /  DBili  x   /  AST  24  /  ALT  23  /  AlkPhos  222<H>  06-19      Assessment and Plan:    1. R foot with gangrene,  2. Severe PAD.  3. ESRD on HD  4. DM    . Waiting for R BKA.  . Vancomycin 1 gm IV today . Get Vanco level tomorrow.  . Supportive care.    Thank you for the courtesy of this consultation.

## 2021-03-13 NOTE — PROGRESS NOTE ADULT - NSHPATTENDINGPLANDISCUSS_GEN_ALL_CORE
Bed: 04  Expected date:   Expected time:   Means of arrival:   Comments:  [place]  
Ct notified  
MD appear   
MD at bedside.   
MD aware of pt's continued reports of pain, new orders received  
Patient resting. Given additional blankets and lights dimmed for comfort. Updated on plan of care. Wife at bedside given ice water. Deny any other wants or needs.   
Pt assisted with urinal upon return from CT  
Pt denies neck pain upon palpation, states \"no it actually feels better when you're touching it\" reports chronic neck pain. MD aware, denies need for c-collar at this time.   
Pt lethargic, o2 sat 91% encouraged to take a few breaths. Reports pain is \"a wee bit\" better 7/10. Pt given ice pack.  
Pt returned fromCT and xray  
Pt to ct  
See trauma sheet for assessment  
TRAUMA ALERT NOTE    TRAUMA ALERT CALLED:  1818  Radiology arrived at 1818 Name Lai  Lab arrived at: 1818  Name:  [place]  CT arrived at:  1824  Name: [place]      
D/w covering nurse.
RN/Pulm/Renal.
nursing and PA Spike in detail
pt. and staff.
RN
nursing and ALLYSON PORTILLO in detail
nursing and ALLYSON PORTILLO in detail
renal, RN
RN
pt, RN.

## 2022-06-22 NOTE — H&P ADULT - MENTAL STATUS
- pt pmh of CVA, right sided hemiparesis, aphasia, dysphagia  - continue home meds eliquis, metoprolol, amiodarone and simvastatin  - continue gabapentin, baclofen   - aspiration and fall precautions  - speech consulted - pt pmh of CVA, right sided hemiparesis, aphasia, dysphagia  - continue home meds eliquis, metoprolol, amiodarone and simvastatin  - continue gabapentin, baclofen   - aspiration and fall precautions  - speech eval noted unable to evaluate as pt is intubated.

## 2023-04-09 NOTE — PROGRESS NOTE ADULT - MENTAL STATUS
[No bony abnormalities] : No bony abnormalities [Left] : left shoulder [5 ___] : forward flexion 5[unfilled]/5 [5___] : internal rotation 5[unfilled]/5 [] : no tenderness to palpation [[email or web address]] : evidence of healed previous distal clavicle fracture and glenohumeral arthritis alert and oriented.

## 2023-07-13 NOTE — PROGRESS NOTE ADULT - PROBLEM SELECTOR PLAN 8
Notification Instructions: Patient will be notified of biopsy results. However, patient instructed to call the office if not contacted within 2 weeks. Neuro- Dr.Kishore Monique. Neuro- Dr.Kishore Monique.  CAt head- no acute pathology.

## 2023-07-28 NOTE — PROGRESS NOTE ADULT - ASSESSMENT
Assessment and Plan:   Problem/Plan - 1:  ·  Problem: Cardiac arrest.  Plan: S/p cardiac arrest secondary to hypoxic respiratory failure; hemodynamically stable and without arrhythmias on telemetry with normal LV function; continue to optimize pulmonary status. Receiving Lasix.     Problem/Plan - 2:  ·  Problem: Aortic stenosis, mild.  Plan: Chronic. No intervention indicated.     Problem/Plan - 3:  ·  Problem: Renal failure, unspecified chronicity.  Plan: No hydronephrosis; acute on chronic renal failure per nephrology. Dialysis being contemplated. follow up renal function .     Problem/Plan - 4:  ·  Problem: Anemia.  Plan:Hgb is improving . Monitor. Decision re: transfusion to be directed  as per medical service.   cardiac wise appears stable ,w ill follow up as needed Endoscopy

## 2023-08-01 NOTE — DISCHARGE NOTE ADULT - SECONDARY DIAGNOSIS.
You can access the FollowMyHealth Patient Portal offered by [facility] by registering at the following website: [email or web address]. By joining [email or web address]’s FollowMyHealth portal, you will also be able to view your health information using other applications (apps) compatible with our system. Cardiopulmonary arrest with successful resuscitation CKD (chronic kidney disease) stage 5, GFR less than 15 ml/min Congestive heart failure (CHF) COPD (chronic obstructive pulmonary disease) Type 2 diabetes mellitus with diabetic chronic kidney disease, unspecified CKD stage, unspecified long term insulin use status Anemia due to chronic kidney disease

## 2023-12-28 NOTE — PROGRESS NOTE ADULT - SUBJECTIVE AND OBJECTIVE BOX
62y  Male  No Known Allergies    CC: Patient is a 62y old  Male who presents with a chief complaint of c/o SOB, respiratory distress and went into cardiac arrest in the ER     HPI:  61 y/o male with PMH of PVD s/p left BKA, ETOH,  COPD,  was sent from [facility] for evaluation of low Pulse ox. He went into cardiopulmonary arrest in ED due to acute hypoxic  respiratory failure. .There is qustion of aspiration preceding the event as cause for initial hypoxia and placed empirically on antibiotics. although the BNP was elevated and acute CHF exacerbation cannot be ruled out as cause. The chest xray showed patchy opacification on the right, TTE showed minimal mitral prolapse but otherwise was normal, post arrest CT brain showed no acute events.   Early this morning the patient became short of breath with increased work of breathing and accessory muscle use, was placed on non rebreather with increased fio2 but acessory muscle use persisted and oxygen sats were remaining low for 100% fio2 so the patient was placed on BIPAP 12/6 with 40% fio2  and is now more comfortable with his breathing with oxygen sats improved         PAST MEDICAL & SURGICAL HISTORY:  Peripheral Arterial Disease  Controlled Type 2 Diabetes with Leg or Foot Ulcer  ETOH Abuse  Amputation, Below Knee, Unilateral, Traumatic    FAMILY HISTORY:      Vitals   Vital Signs Last 24 Hrs  T(C): 36.6 (02 Dec 2017 04:02), Max: 36.8 (01 Dec 2017 08:06)  T(F): 97.8 (02 Dec 2017 04:02), Max: 98.2 (01 Dec 2017 08:06)  HR: 59 (02 Dec 2017 04:00) (55 - 85)  BP: 153/64 (02 Dec 2017 04:00) (116/63 - 157/77)  BP(mean): 90 (02 Dec 2017 04:00) (73 - 92)  RR: 16 (02 Dec 2017 04:00) (13 - 23)  SpO2: 96% (02 Dec 2017 04:00) (91% - 100%)  ABG - ( 02 Dec 2017 01:13 )  pH: x     /  pCO2: 37    /  pO2: 57    / HCO3: 23    / Base Excess: -1.4  /  SaO2: 90                I&O's Summary    2017 07:01  -  01 Dec 2017 07:00  --------------------------------------------------------  IN: 2190 mL / OUT: 450 mL / NET: 1740 mL    01 Dec 2017 07:01  -  02 Dec 2017 06:04  --------------------------------------------------------  IN: 1835.4 mL / OUT: 100 mL / NET: 1735.4 mL        LABS      144  |  109<H>  |  59<H>  ----------------------------<  110<H>  5.1   |  22  |  4.95<H>    Ca    7.5<L>      01 Dec 2017 06:50  Phos  4.8       Mg     2.9                                 7.7    7.1   )-----------( 143      ( 01 Dec 2017 06:50 )             23.7             CAPILLARY BLOOD GLUCOSE      POCT Blood Glucose.: 117 mg/dL (01 Dec 2017 20:46)    Urinalysis Basic - ( 2017 06:27 )    Color: Yellow / Appearance: Slightly Turbid / S.020 / pH: x  Gluc: x / Ketone: Negative  / Bili: Negative / Urobili: Negative mg/dL   Blood: x / Protein: 500 mg/dL / Nitrite: Negative   Leuk Esterase: Trace / RBC: 6-10 /HPF / WBC 0-2   Sq Epi: x / Non Sq Epi: Moderate / Bacteria: Few        VENT SETTINGS   Mode: PS (Pressure Support)/ Spontaneous  FiO2: 60  PEEP: 5  PS: 10  ITime: 1  MAP: 11  PIP: 23      Meds  MEDICATIONS  (STANDING):  ALBUTerol    0.083% 2.5 milliGRAM(s) Nebulizer every 8 hours  amLODIPine   Tablet 5 milliGRAM(s) Oral daily  chlorhexidine 0.12% Liquid 15 milliLiter(s) Swish and Spit two times a day  dextrose 5%. 1000 milliLiter(s) (50 mL/Hr) IV Continuous <Continuous>  dextrose 50% Injectable 12.5 Gram(s) IV Push once  dextrose 50% Injectable 25 Gram(s) IV Push once  dextrose 50% Injectable 25 Gram(s) IV Push once  heparin  Injectable 5000 Unit(s) SubCutaneous every 8 hours  influenza   Vaccine 0.5 milliLiter(s) IntraMuscular once  insulin lispro (HumaLOG) corrective regimen sliding scale   SubCutaneous Before meals and at bedtime  metoprolol     tartrate 25 milliGRAM(s) Oral every 8 hours  pantoprazole  Injectable 40 milliGRAM(s) IV Push daily  piperacillin/tazobactam IVPB. 3.375 Gram(s) IV Intermittent every 12 hours  sodium bicarbonate 650 milliGRAM(s) Oral two times a day  sodium chloride 0.45%. 1000 milliLiter(s) (50 mL/Hr) IV Continuous <Continuous>      REVIEW OF SYSTEMS:    CONSTITUTIONAL: shortness of breath  EYES: No eye pain, visual disturbances, or discharge  ENMT:  No difficulty hearing, tinnitus, vertigo; No sinus or throat pain  NECK: No pain or stiffness  BREASTS: No pain, masses, or nipple discharge  RESPIRATORY: No cough, wheezing, chills or hemoptysis; possitive shortness of breath  CARDIOVASCULAR: No chest pain, palpitations, dizziness, or leg swelling  GASTROINTESTINAL: No abdominal or epigastric pain. No nausea, vomiting, or hematemesis; No diarrhea or constipation. No melena or hematochezia.  GENITOURINARY: No dysuria, frequency, hematuria, or incontinence  NEUROLOGICAL: No headaches, memory loss, loss of strength, numbness, or tremors  SKIN: No itching, burning, rashes, or lesions   LYMPH NODES: No enlarged glands  ENDOCRINE: No heat or cold intolerance; No hair loss  MUSCULOSKELETAL: No joint pain or swelling; No muscle, back, or extremity pain  PSYCHIATRIC: No depression, anxiety, mood swings, or difficulty sleeping  HEME/LYMPH: No easy bruising, or bleeding gums  ALLERY AND IMMUNOLOGIC: No hives or eczema      Physicial Exam:     Constitutional: increased work of breathing with accessory muscle use   HEENT: PERRLA, EOMI, no drainage or redness  Neck: No bruits; no thyromegaly or nodules,  No JVD  Back: Normal spine flexure, No CVA tenderness, No deformity or limitation of movement  Respiratory: Breath Sounds equal & clear to percussion & auscultation, positive accessory muscle use with SOB and hypoxia   Cardiovascular: Regular rate & rhythm, normal S1, S2; no murmurs, gallops or rubs; no S3, S4  Gastrointestinal: Soft, non-tender, non distended no hepatosplenomegaly, normal bowel sounds  Extremities: No peripheral edema, No cyanosis, clubbing   Vascular: Equal and normal pulses: 2+ peripheral pulses throughout  Neurological: GCS:    A&O x 3; no sensory, motor  deficits, normal reflexes  Psychiatric: Normal mood, normal affect  Musculoskeletal: No joint pain, swelling or deformity; no limitation of movement  Skin: No rashes 29-Dec-2023 02:23 29-Dec-2023 15:33

## 2024-05-14 NOTE — PROGRESS NOTE ADULT - ASSESSMENT
Problem: Adult Inpatient Plan of Care  Goal: Plan of Care Review  Outcome: Met  Goal: Patient-Specific Goal (Individualized)  Outcome: Met  Goal: Absence of Hospital-Acquired Illness or Injury  Outcome: Met  Goal: Optimal Comfort and Wellbeing  Outcome: Met  Goal: Readiness for Transition of Care  Outcome: Met     Problem: Diabetes Comorbidity  Goal: Blood Glucose Level Within Targeted Range  Outcome: Met     Problem: Acute Kidney Injury/Impairment  Goal: Fluid and Electrolyte Balance  Outcome: Met  Goal: Improved Oral Intake  Outcome: Met  Goal: Effective Renal Function  Outcome: Met     Problem: Skin Injury Risk Increased  Goal: Skin Health and Integrity  Outcome: Met     Problem: Comorbidity Management  Goal: Blood Pressure in Desired Range  Outcome: Met     Problem: Fatigue  Goal: Improved Activity Tolerance  Outcome: Met     Problem: Fall Injury Risk  Goal: Absence of Fall and Fall-Related Injury  Outcome: Met     Problem: Fluid Volume Deficit  Goal: Fluid Balance  Outcome: Met  Intervention: Monitor and Manage Hypovolemia  Flowsheets (Taken 5/14/2024 1003)  Fluid/Electrolyte Management:   fluids provided   fluids adjusted   intravenous fluid replacement initiated     Problem: Chronic Kidney Disease  Goal: Optimal Coping with Chronic Illness  Outcome: Met  Goal: Electrolyte Balance  Outcome: Met  Goal: Fluid Balance  Outcome: Met  Goal: Optimal Functional Ability  Outcome: Met  Goal: Absence of Anemia Signs and Symptoms  Outcome: Met  Goal: Optimal Oral Intake  Outcome: Met  Goal: Acceptable Pain Control  Outcome: Met  Goal: Minimize Renal Failure Effects  Outcome: Met     Problem: Hypertension Acute  Goal: Blood Pressure Within Desired Range  Outcome: Met  Intervention: Normalize Blood Pressure  Flowsheets (Taken 5/14/2024 1009)  Sensory Stimulation Regulation:   auditory stimulation minimized   television on  Medication Review/Management: medications reviewed     Problem: Stroke, Ischemic (Includes  Transient Ischemic Attack)  Goal: Optimal Coping  Outcome: Met  Goal: Effective Bowel Elimination  Outcome: Met  Goal: Optimal Cerebral Tissue Perfusion  Outcome: Met  Goal: Optimal Cognitive Function  Outcome: Met  Goal: Improved Communication Skills  Outcome: Met  Goal: Optimal Functional Ability  Outcome: Met  Goal: Optimal Nutrition Intake  Outcome: Met  Goal: Effective Oxygenation and Ventilation  Outcome: Met  Goal: Improved Sensorimotor Function  Outcome: Met  Goal: Safe and Effective Swallow  Outcome: Met  Goal: Effective Urinary Elimination  Outcome: Met      ·	EDWIN on CKD 4: ATN  ·	s/p cardiac arrest, on vent, ? Pneumonia  ·	Metaboic acidosis  ·	Hyperkalemia  ·	Diabetes  ·	Anemia    Stable creatinine. IV lasix. On Po bicarb.    Monitor h/h trend. ? PRBC transfusion. Procrit rx.   Pulmonary and cardiology follow up. On zosyn.   Will follow electrolytes and renal function trend.   No indication for HD a this time.

## 2025-03-04 NOTE — PROGRESS NOTE ADULT - ASSESSMENT
This RN called patient to discuss PHQ symptoms. Patient answered the phone and stated that he is not currently having any suicidal ideations or plan to self harm.   Patient answered several days to feeling better off dead and stated that he does not want to be dead. He has thoughts that come and go, but does not have concerns for self harm or suicide. He scheduled a visit with his PCP provider for Monday virtually and will schedule a  visit sooner if he feels he needs to speak to someone sooner regarding his mental health.    Assessment and Plan:   Problem/Plan - 1:  ·  Problem: Cardiac arrest.  Plan: S/p cardiac arrest secondary to hypoxic respiratory failure; extubated and hemodynamically stable with no arrhythmias on telemetry and normal LV function; continue to optimize pulmonary status.     Problem/Plan - 2:  ·  Problem: Aortic stenosis, mild.  Plan: Chronic. No intervention indicated.     Problem/Plan - 3:  ·  Problem: Renal failure, unspecified chronicity.  Plan: Continue Lasix to keep I=O or slightly (I<O) given advanced renal failure. Assessment and Plan:   Problem/Plan - 1:  ·  Problem: Cardiac arrest.  Plan: S/p cardiac arrest secondary to hypoxic respiratory failure; extubated and hemodynamically stable with no arrhythmias on telemetry and normal LV function; continue to optimize pulmonary status.     Problem/Plan - 2:  ·  Problem: Aortic stenosis, mild.  Plan: Chronic. No intervention indicated.     Problem/Plan - 3:  ·  Problem: Renal failure, unspecified chronicity.  Plan: Continue Lasix to keep I=O or slightly (I<O) given advanced renal failure.     Problem/Plan - 4: Hypertension: Poorly controlled; resume hydralazine. good balance